# Patient Record
Sex: FEMALE | Race: WHITE | Employment: OTHER | URBAN - METROPOLITAN AREA
[De-identification: names, ages, dates, MRNs, and addresses within clinical notes are randomized per-mention and may not be internally consistent; named-entity substitution may affect disease eponyms.]

---

## 2017-01-09 ENCOUNTER — HOSPITAL ENCOUNTER (OUTPATIENT)
Dept: SLEEP MEDICINE | Age: 57
Discharge: HOME | End: 2017-01-09
Payer: MEDICAID

## 2017-01-09 PROCEDURE — 95810 POLYSOM 6/> YRS 4/> PARAM: CPT

## 2017-04-04 PROBLEM — Z87.891 FORMER SMOKER: Chronic | Status: ACTIVE | Noted: 2017-04-04

## 2017-04-04 PROBLEM — Z87.81 HISTORY OF FRACTURE OF WRIST: Chronic | Status: ACTIVE | Noted: 2017-04-04

## 2017-06-09 PROBLEM — J44.1 COPD WITH ACUTE EXACERBATION (HCC): Status: ACTIVE | Noted: 2017-06-09

## 2017-06-09 PROBLEM — R05.9 COUGH: Status: ACTIVE | Noted: 2017-06-09

## 2017-08-08 PROBLEM — R05.9 COUGH: Status: RESOLVED | Noted: 2017-06-09 | Resolved: 2017-08-08

## 2017-08-08 PROBLEM — J44.1 COPD WITH ACUTE EXACERBATION (HCC): Status: RESOLVED | Noted: 2017-06-09 | Resolved: 2017-08-08

## 2017-08-22 ENCOUNTER — HOSPITAL ENCOUNTER (OUTPATIENT)
Dept: MAMMOGRAPHY | Age: 57
Discharge: HOME | End: 2017-08-22
Attending: FAMILY MEDICINE
Payer: MEDICAID

## 2017-08-22 DIAGNOSIS — Z87.81 HISTORY OF FRACTURE OF WRIST: Chronic | ICD-10-CM

## 2017-08-22 DIAGNOSIS — Z78.0 ASYMPTOMATIC MENOPAUSAL STATE: ICD-10-CM

## 2017-08-22 PROCEDURE — 77080 DXA BONE DENSITY AXIAL: CPT

## 2017-08-23 NOTE — PROGRESS NOTES
I called patient to inform them of abnormal lab/radiology results. Patient verbalized understanding on all.

## 2017-08-30 ENCOUNTER — HOSPITAL ENCOUNTER (EMERGENCY)
Age: 57
Discharge: HOME | End: 2017-08-30
Attending: EMERGENCY MEDICINE
Payer: MEDICAID

## 2017-08-30 ENCOUNTER — APPOINTMENT (OUTPATIENT)
Dept: CT IMAGING | Age: 57
End: 2017-08-30
Attending: EMERGENCY MEDICINE
Payer: MEDICAID

## 2017-08-30 VITALS
TEMPERATURE: 97.9 F | SYSTOLIC BLOOD PRESSURE: 99 MMHG | BODY MASS INDEX: 29.08 KG/M2 | HEART RATE: 83 BPM | RESPIRATION RATE: 16 BRPM | DIASTOLIC BLOOD PRESSURE: 65 MMHG | HEIGHT: 62 IN | OXYGEN SATURATION: 100 % | WEIGHT: 158 LBS

## 2017-08-30 DIAGNOSIS — S00.01XA SCALP ABRASION, INITIAL ENCOUNTER: Primary | ICD-10-CM

## 2017-08-30 DIAGNOSIS — R60.9 PERIPHERAL EDEMA: ICD-10-CM

## 2017-08-30 LAB
ALBUMIN SERPL-MCNC: 3.6 G/DL (ref 3.5–5)
ALBUMIN/GLOB SERPL: 1 {RATIO} (ref 1.2–3.5)
ALP SERPL-CCNC: 76 U/L (ref 50–136)
ALT SERPL-CCNC: 25 U/L (ref 12–65)
ANION GAP SERPL CALC-SCNC: 6 MMOL/L (ref 7–16)
AST SERPL-CCNC: 24 U/L (ref 15–37)
BASOPHILS # BLD: 0 K/UL (ref 0–0.2)
BASOPHILS NFR BLD: 0 % (ref 0–2)
BILIRUB SERPL-MCNC: 0.4 MG/DL (ref 0.2–1.1)
BNP SERPL-MCNC: 40 PG/ML
BUN SERPL-MCNC: 9 MG/DL (ref 6–23)
CALCIUM SERPL-MCNC: 9 MG/DL (ref 8.3–10.4)
CHLORIDE SERPL-SCNC: 101 MMOL/L (ref 98–107)
CO2 SERPL-SCNC: 31 MMOL/L (ref 21–32)
CREAT SERPL-MCNC: 1.11 MG/DL (ref 0.6–1)
DIFFERENTIAL METHOD BLD: ABNORMAL
EOSINOPHIL # BLD: 0.3 K/UL (ref 0–0.8)
EOSINOPHIL NFR BLD: 7 % (ref 0.5–7.8)
ERYTHROCYTE [DISTWIDTH] IN BLOOD BY AUTOMATED COUNT: 12.1 % (ref 11.9–14.6)
GLOBULIN SER CALC-MCNC: 3.5 G/DL (ref 2.3–3.5)
GLUCOSE SERPL-MCNC: 97 MG/DL (ref 65–100)
HCT VFR BLD AUTO: 34.1 % (ref 35.8–46.3)
HGB BLD-MCNC: 12 G/DL (ref 11.7–15.4)
IMM GRANULOCYTES # BLD: 0 K/UL (ref 0–0.5)
IMM GRANULOCYTES NFR BLD: 0 % (ref 0–5)
LYMPHOCYTES # BLD: 1.9 K/UL (ref 0.5–4.6)
LYMPHOCYTES NFR BLD: 42 % (ref 13–44)
MCH RBC QN AUTO: 31.9 PG (ref 26.1–32.9)
MCHC RBC AUTO-ENTMCNC: 35.2 G/DL (ref 31.4–35)
MCV RBC AUTO: 90.7 FL (ref 79.6–97.8)
MONOCYTES # BLD: 0.3 K/UL (ref 0.1–1.3)
MONOCYTES NFR BLD: 6 % (ref 4–12)
NEUTS SEG # BLD: 2.1 K/UL (ref 1.7–8.2)
NEUTS SEG NFR BLD: 45 % (ref 43–78)
PLATELET # BLD AUTO: 253 K/UL (ref 150–450)
PMV BLD AUTO: 9.2 FL (ref 10.8–14.1)
POTASSIUM SERPL-SCNC: 3.7 MMOL/L (ref 3.5–5.1)
PROT SERPL-MCNC: 7.1 G/DL (ref 6.3–8.2)
RBC # BLD AUTO: 3.76 M/UL (ref 4.05–5.25)
SODIUM SERPL-SCNC: 138 MMOL/L (ref 136–145)
WBC # BLD AUTO: 4.5 K/UL (ref 4.3–11.1)

## 2017-08-30 PROCEDURE — 70450 CT HEAD/BRAIN W/O DYE: CPT

## 2017-08-30 PROCEDURE — 83880 ASSAY OF NATRIURETIC PEPTIDE: CPT | Performed by: EMERGENCY MEDICINE

## 2017-08-30 PROCEDURE — 80053 COMPREHEN METABOLIC PANEL: CPT | Performed by: EMERGENCY MEDICINE

## 2017-08-30 PROCEDURE — 85025 COMPLETE CBC W/AUTO DIFF WBC: CPT | Performed by: EMERGENCY MEDICINE

## 2017-08-30 PROCEDURE — 99283 EMERGENCY DEPT VISIT LOW MDM: CPT | Performed by: EMERGENCY MEDICINE

## 2017-08-30 NOTE — ED TRIAGE NOTES
Pt states she slipped and fell last night hitting her head. Pt denies any loc. Pt was at pain management today and was told to come to the er. Pt is on a blood thinner.

## 2017-08-30 NOTE — DISCHARGE INSTRUCTIONS
Head Injury: Care Instructions  Your Care Instructions  Most injuries to the head are minor. Bumps, cuts, and scrapes on the head and face usually heal well and can be treated the same as injuries to other parts of the body. Although it's rare, once in a while a more serious problem shows up after you are home. So it's good to be on the lookout for symptoms for a day or two. Follow-up care is a key part of your treatment and safety. Be sure to make and go to all appointments, and call your doctor if you are having problems. It's also a good idea to know your test results and keep a list of the medicines you take. How can you care for yourself at home? · Follow your doctor's instructions. He or she will tell you if you need someone to watch you closely for the next 24 hours or longer. · Take it easy for the next few days or more if you are not feeling well. · Ask your doctor when it's okay for you to go back to activities like driving a car, riding a bike, or operating machinery. When should you call for help? Call 911 anytime you think you may need emergency care. For example, call if:  · You have a seizure. · You passed out (lost consciousness). · You are confused or can't stay awake. Call your doctor now or seek immediate medical care if:  · You have new or worse vomiting. · You feel less alert. · You have new weakness or numbness in any part of your body. Watch closely for changes in your health, and be sure to contact your doctor if:  · You do not get better as expected. · You have new symptoms, such as headaches, trouble concentrating, or changes in mood. Where can you learn more? Go to http://carole-shira.info/. Enter O419 in the search box to learn more about \"Head Injury: Care Instructions. \"  Current as of: October 14, 2016  Content Version: 11.3  © 0518-7565 [employer].  Care instructions adapted under license by [employer] (which disclaims liability or warranty for this information). If you have questions about a medical condition or this instruction, always ask your healthcare professional. [place] [address] any warranty or liability for your use of this information. Leg and Ankle Edema: Care Instructions  Your Care Instructions  Swelling in the legs, ankles, and feet is called edema. It is common after you sit or stand for a while. Long plane flights or car rides often cause swelling in the legs and feet. You may also have swelling if you have to stand for long periods of time at your job. Problems with the veins in the legs (varicose veins) and changes in hormones can also cause swelling. Sometimes the swelling in the ankles and feet is caused by a more serious problem, such as heart failure, infection, blood clots, or liver or kidney disease. Follow-up care is a key part of your treatment and safety. Be sure to make and go to all appointments, and call your doctor if you are having problems. Its also a good idea to know your test results and keep a list of the medicines you take. How can you care for yourself at home? · If your doctor gave you medicine, take it as prescribed. Call your doctor if you think you are having a problem with your medicine. · Whenever you are resting, raise your legs up. Try to keep the swollen area higher than the level of your heart. · Take breaks from standing or sitting in one position. ¨ Walk around to increase the blood flow in your lower legs. ¨ Move your feet and ankles often while you stand, or tighten and relax your leg muscles. · Wear support stockings. Put them on in the morning, before swelling gets worse. · Eat a balanced diet. Lose weight if you need to. · Limit the amount of salt (sodium) in your diet. Salt holds fluid in the body and may increase swelling. When should you call for help? Call 911 anytime you think you may need emergency care.  For example, call if:  · You have symptoms of a blood clot in your lung (called a pulmonary embolism). These may include:  ¨ Sudden chest pain. ¨ Trouble breathing. ¨ Coughing up blood. Call your doctor now or seek immediate medical care if:  · You have signs of a blood clot, such as:  ¨ Pain in your calf, back of the knee, thigh, or groin. ¨ Redness and swelling in your leg or groin. · You have symptoms of infection, such as:  ¨ Increased pain, swelling, warmth, or redness. ¨ Red streaks or pus. ¨ A fever. Watch closely for changes in your health, and be sure to contact your doctor if:  · Your swelling is getting worse. · You have new or worsening pain in your legs. · You do not get better as expected. Where can you learn more? Go to http://carole-shira.info/. Enter W914 in the search box to learn more about \"Leg and Ankle Edema: Care Instructions. \"  Current as of: March 20, 2017  Content Version: 11.3  © 1724-4706 [employer]. Care instructions adapted under license by [employer] (which disclaims liability or warranty for this information). If you have questions about a medical condition or this instruction, always ask your healthcare professional. [address] any warranty or liability for your use of this information.

## 2017-08-30 NOTE — ED PROVIDER NOTES
HPI Comments: 60-year-old white female with history of DVT currently on xarelto reportedly fell forward off of the toilet last night and struck her head on a towel rack. She had some bleeding from her scalp. She had no loss of consciousness. No nausea or vomiting. She went to her pain management doctor today and was advised to come to the emergency department because of the head trauma. She is also complaining of swelling to her legs. This has been present for over a month. She has been seen in the emergency department and had ultrasound negative for DVT. Patient is a 55 y.o. female presenting with fall. The history is provided by the patient. Fall   Pertinent negatives include no fever, no abdominal pain, no nausea and no vomiting. Past Medical History:   Diagnosis Date    Arrhythmia     Diagnosed 2010, patient believes now resolved.  Arthritis     Osteoarthritis diagnosed [id number]    Asthma     Diagnosed- can't recall.  Chronic pain     diagnosed 1994 severe fibromyalgia and 2001 infarcts pleurisy from PE.  Depression     Diagnosed 2002     GERD (gastroesophageal reflux disease)     Diagnosed 2001.  Hypercholesterolemia     Diagnosed 2011.  Hypertension     Diagnose 2001     Other ill-defined conditions     IBS< Fibra    Thromboembolus [facility])     Diagnosed 2001 DVT    Thyroid disease     hypothryroidism diagnosed approx 2013.  Trauma     PTSD diagnosed 1999. Past Surgical History:   Procedure Laterality Date    HX GYN      2 D&C due to miscarriages. Year 1984 and 1992. 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 2014 patient had tendon transplant, carpal tunnel surgery and trigger finger repair left hand.             Family History:   Problem Relation Age of Onset    Clotting Disorder Maternal Aunt     Cancer Maternal Uncle     Cancer Maternal Grandmother     Clotting Disorder Maternal Grandfather     Heart Disease Maternal Grandfather     Cancer Maternal Grandfather     Clotting Disorder Mother        Social History     Social History    Marital status:      Spouse name: N/A    Number of children: N/A    Years of education: N/A     Occupational History    Not on file. Social History Main Topics    Smoking status: Former Smoker     Types: Cigarettes     Quit date: 9/1/2016    Smokeless tobacco: Never Used    Alcohol use Not on file      Comment: Once a year.  Drug use: No    Sexual activity: No     Other Topics Concern    Not on file     Social History Narrative         ALLERGIES: Codeine    Review of Systems   Constitutional: Negative for fever. HENT: Negative for congestion. Respiratory: Negative for cough and shortness of breath. Cardiovascular: Negative for chest pain. Gastrointestinal: Negative for abdominal pain, nausea and vomiting. Musculoskeletal: Negative for back pain and neck pain. Skin: Negative for rash. Vitals:    08/30/17 1645   BP: 99/66   Pulse: 88   Resp: 26   Temp: 97.9 °F (36.6 °C)   SpO2: 100%   Weight: 71.7 kg (158 lb)   Height: 5' 2\" (1.575 m)            Physical Exam   Constitutional: She is oriented to person, place, and time. She appears well-developed and well-nourished. No distress. HENT:   Head: Normocephalic. Small scalp abrasion right upper parietal area. No active bleeding. Eyes: Conjunctivae and EOM are normal. Pupils are equal, round, and reactive to light. Neck: Normal range of motion. Neck supple. Cardiovascular: Normal rate and regular rhythm. No murmur heard. Pulmonary/Chest: Effort normal and breath sounds normal. She has no wheezes. She has no rales. Musculoskeletal: She exhibits edema. Symmetric lower extremity edema   Neurological: She is alert and oriented to person, place, and time. She exhibits normal muscle tone. Coordination normal.   Skin: Skin is warm and dry.    Psychiatric: She has a normal mood and affect. Nursing note and vitals reviewed. MDM  Number of Diagnoses or Management Options  Peripheral edema:   Scalp abrasion, initial encounter:   Diagnosis management comments: Lab work reveals no evidence of renal failure, liver failure or heart failure. I suspect her lower extremity swelling is venous insufficiency. Advised to use compression hose. Head CT shows no intracranial injury or skull fracture.        Amount and/or Complexity of Data Reviewed  Clinical lab tests: ordered and reviewed  Tests in the radiology section of CPT®: ordered and reviewed    Risk of Complications, Morbidity, and/or Mortality  Presenting problems: moderate  Diagnostic procedures: low  Management options: low      ED Course       Procedures

## 2017-12-26 PROBLEM — K59.09 CHRONIC CONSTIPATION: Status: ACTIVE | Noted: 2017-12-26

## 2017-12-26 PROBLEM — K59.09 CHRONIC CONSTIPATION: Chronic | Status: ACTIVE | Noted: 2017-12-26

## 2018-04-16 PROBLEM — R68.2 DRY MOUTH: Chronic | Status: ACTIVE | Noted: 2018-04-16

## 2018-04-16 PROBLEM — I10 BENIGN ESSENTIAL HTN: Chronic | Status: ACTIVE | Noted: 2018-04-16

## 2018-04-16 PROBLEM — R14.0 ABDOMINAL BLOATING: Status: RESOLVED | Noted: 2017-03-08 | Resolved: 2018-04-16

## 2018-04-16 PROBLEM — R13.10 DYSPHAGIA: Status: ACTIVE | Noted: 2017-03-08

## 2018-04-16 PROBLEM — B37.0 THRUSH: Chronic | Status: ACTIVE | Noted: 2018-04-16

## 2018-04-16 PROBLEM — H04.123 DRY EYES: Chronic | Status: ACTIVE | Noted: 2018-04-16

## 2018-04-16 PROBLEM — R13.10 DYSPHAGIA: Status: RESOLVED | Noted: 2017-03-08 | Resolved: 2018-04-16

## 2018-04-16 PROBLEM — R14.0 ABDOMINAL BLOATING: Status: ACTIVE | Noted: 2017-03-08

## 2018-09-20 ENCOUNTER — ANESTHESIA EVENT (OUTPATIENT)
Dept: SURGERY | Age: 58
End: 2018-09-20
Payer: MEDICAID

## 2018-09-21 ENCOUNTER — HOSPITAL ENCOUNTER (OUTPATIENT)
Age: 58
Setting detail: OUTPATIENT SURGERY
Discharge: HOME | End: 2018-09-21
Attending: ORTHOPAEDIC SURGERY | Admitting: ORTHOPAEDIC SURGERY
Payer: MEDICAID

## 2018-09-21 ENCOUNTER — ANESTHESIA (OUTPATIENT)
Dept: SURGERY | Age: 58
End: 2018-09-21
Payer: MEDICAID

## 2018-09-21 VITALS
SYSTOLIC BLOOD PRESSURE: 100 MMHG | HEART RATE: 85 BPM | DIASTOLIC BLOOD PRESSURE: 63 MMHG | TEMPERATURE: 97.5 F | RESPIRATION RATE: 16 BRPM | BODY MASS INDEX: 28.53 KG/M2 | WEIGHT: 156 LBS | OXYGEN SATURATION: 90 %

## 2018-09-21 DIAGNOSIS — G89.18 POST-OP PAIN: Primary | ICD-10-CM

## 2018-09-21 LAB — POTASSIUM BLD-SCNC: 3.7 MMOL/L (ref 3.5–5.1)

## 2018-09-21 PROCEDURE — 77030003602 HC NDL NRV BLK BBMI -B: Performed by: ANESTHESIOLOGY

## 2018-09-21 PROCEDURE — 77030039266 HC ADH SKN EXOFIN S2SG -A: Performed by: ORTHOPAEDIC SURGERY

## 2018-09-21 PROCEDURE — 74011250636 HC RX REV CODE- 250/636: Performed by: ANESTHESIOLOGY

## 2018-09-21 PROCEDURE — 76942 ECHO GUIDE FOR BIOPSY: CPT | Performed by: ORTHOPAEDIC SURGERY

## 2018-09-21 PROCEDURE — 77030000032 HC CUF TRNQT ZIMM -B: Performed by: ORTHOPAEDIC SURGERY

## 2018-09-21 PROCEDURE — 76060000033 HC ANESTHESIA 1 TO 1.5 HR: Performed by: ORTHOPAEDIC SURGERY

## 2018-09-21 PROCEDURE — 77030004434 HC BUR RND STRY -B: Performed by: ORTHOPAEDIC SURGERY

## 2018-09-21 PROCEDURE — 77030002966 HC SUT PDS J&J -A: Performed by: ORTHOPAEDIC SURGERY

## 2018-09-21 PROCEDURE — 77030006605 HC BLD CRPL IN BIOM -C: Performed by: ORTHOPAEDIC SURGERY

## 2018-09-21 PROCEDURE — 77030002912 HC SUT ETHBND J&J -A: Performed by: ORTHOPAEDIC SURGERY

## 2018-09-21 PROCEDURE — 74011250636 HC RX REV CODE- 250/636

## 2018-09-21 PROCEDURE — 76010000161 HC OR TIME 1 TO 1.5 HR INTENSV-TIER 1: Performed by: ORTHOPAEDIC SURGERY

## 2018-09-21 PROCEDURE — A4565 SLINGS: HCPCS | Performed by: ORTHOPAEDIC SURGERY

## 2018-09-21 PROCEDURE — 74011000250 HC RX REV CODE- 250

## 2018-09-21 PROCEDURE — 76210000016 HC OR PH I REC 1 TO 1.5 HR: Performed by: ORTHOPAEDIC SURGERY

## 2018-09-21 PROCEDURE — 77030018836 HC SOL IRR NACL ICUM -A: Performed by: ORTHOPAEDIC SURGERY

## 2018-09-21 PROCEDURE — 84132 ASSAY OF SERUM POTASSIUM: CPT

## 2018-09-21 PROCEDURE — 77030006788 HC BLD SAW OSC STRY -B: Performed by: ORTHOPAEDIC SURGERY

## 2018-09-21 PROCEDURE — 77030002916 HC SUT ETHLN J&J -A: Performed by: ORTHOPAEDIC SURGERY

## 2018-09-21 PROCEDURE — 77030019908 HC STETH ESOPH SIMS -A: Performed by: ANESTHESIOLOGY

## 2018-09-21 PROCEDURE — 76010010054 HC POST OP PAIN BLOCK: Performed by: ORTHOPAEDIC SURGERY

## 2018-09-21 PROCEDURE — 76210000020 HC REC RM PH II FIRST 0.5 HR: Performed by: ORTHOPAEDIC SURGERY

## 2018-09-21 PROCEDURE — 74011250636 HC RX REV CODE- 250/636: Performed by: ORTHOPAEDIC SURGERY

## 2018-09-21 RX ORDER — OXYCODONE HYDROCHLORIDE 5 MG/1
10 TABLET ORAL
Status: DISCONTINUED | OUTPATIENT
Start: 2018-09-21 | End: 2018-09-21 | Stop reason: HOSPADM

## 2018-09-21 RX ORDER — NALOXONE HYDROCHLORIDE 0.4 MG/ML
0.1 INJECTION, SOLUTION INTRAMUSCULAR; INTRAVENOUS; SUBCUTANEOUS
Status: DISCONTINUED | OUTPATIENT
Start: 2018-09-21 | End: 2018-09-21 | Stop reason: HOSPADM

## 2018-09-21 RX ORDER — FLUMAZENIL 0.1 MG/ML
0.2 INJECTION INTRAVENOUS AS NEEDED
Status: DISCONTINUED | OUTPATIENT
Start: 2018-09-21 | End: 2018-09-21 | Stop reason: HOSPADM

## 2018-09-21 RX ORDER — FENTANYL CITRATE 50 UG/ML
100 INJECTION, SOLUTION INTRAMUSCULAR; INTRAVENOUS ONCE
Status: COMPLETED | OUTPATIENT
Start: 2018-09-21 | End: 2018-09-21

## 2018-09-21 RX ORDER — ONDANSETRON 8 MG/1
8 TABLET, ORALLY DISINTEGRATING ORAL
Qty: 12 TAB | Refills: 1 | Status: SHIPPED | OUTPATIENT
Start: 2018-09-21 | End: 2018-10-16 | Stop reason: ALTCHOICE

## 2018-09-21 RX ORDER — CEFAZOLIN SODIUM/WATER 2 G/20 ML
2 SYRINGE (ML) INTRAVENOUS
Status: COMPLETED | OUTPATIENT
Start: 2018-09-21 | End: 2018-09-21

## 2018-09-21 RX ORDER — PROPOFOL 10 MG/ML
INJECTION, EMULSION INTRAVENOUS
Status: DISCONTINUED | OUTPATIENT
Start: 2018-09-21 | End: 2018-09-21 | Stop reason: HOSPADM

## 2018-09-21 RX ORDER — PROPOFOL 10 MG/ML
INJECTION, EMULSION INTRAVENOUS AS NEEDED
Status: DISCONTINUED | OUTPATIENT
Start: 2018-09-21 | End: 2018-09-21 | Stop reason: HOSPADM

## 2018-09-21 RX ORDER — DIPHENHYDRAMINE HYDROCHLORIDE 50 MG/ML
12.5 INJECTION, SOLUTION INTRAMUSCULAR; INTRAVENOUS
Status: DISCONTINUED | OUTPATIENT
Start: 2018-09-21 | End: 2018-09-21 | Stop reason: HOSPADM

## 2018-09-21 RX ORDER — LIDOCAINE HYDROCHLORIDE 10 MG/ML
0.1 INJECTION INFILTRATION; PERINEURAL AS NEEDED
Status: DISCONTINUED | OUTPATIENT
Start: 2018-09-21 | End: 2018-09-21 | Stop reason: HOSPADM

## 2018-09-21 RX ORDER — OXYCODONE HYDROCHLORIDE 5 MG/1
5 TABLET ORAL
Qty: 30 TAB | Refills: 0 | Status: SHIPPED | OUTPATIENT
Start: 2018-09-21 | End: 2018-10-16 | Stop reason: ALTCHOICE

## 2018-09-21 RX ORDER — SODIUM CHLORIDE, SODIUM LACTATE, POTASSIUM CHLORIDE, CALCIUM CHLORIDE 600; 310; 30; 20 MG/100ML; MG/100ML; MG/100ML; MG/100ML
75 INJECTION, SOLUTION INTRAVENOUS CONTINUOUS
Status: DISCONTINUED | OUTPATIENT
Start: 2018-09-21 | End: 2018-09-21 | Stop reason: HOSPADM

## 2018-09-21 RX ORDER — HYDROMORPHONE HYDROCHLORIDE 2 MG/ML
0.5 INJECTION, SOLUTION INTRAMUSCULAR; INTRAVENOUS; SUBCUTANEOUS
Status: DISCONTINUED | OUTPATIENT
Start: 2018-09-21 | End: 2018-09-21 | Stop reason: HOSPADM

## 2018-09-21 RX ORDER — LIDOCAINE HYDROCHLORIDE 20 MG/ML
INJECTION, SOLUTION EPIDURAL; INFILTRATION; INTRACAUDAL; PERINEURAL AS NEEDED
Status: DISCONTINUED | OUTPATIENT
Start: 2018-09-21 | End: 2018-09-21 | Stop reason: HOSPADM

## 2018-09-21 RX ORDER — MIDAZOLAM HYDROCHLORIDE 1 MG/ML
2 INJECTION, SOLUTION INTRAMUSCULAR; INTRAVENOUS ONCE
Status: COMPLETED | OUTPATIENT
Start: 2018-09-21 | End: 2018-09-21

## 2018-09-21 RX ORDER — OXYCODONE HYDROCHLORIDE 5 MG/1
5 TABLET ORAL
Status: DISCONTINUED | OUTPATIENT
Start: 2018-09-21 | End: 2018-09-21 | Stop reason: HOSPADM

## 2018-09-21 RX ORDER — MIDAZOLAM HYDROCHLORIDE 1 MG/ML
2 INJECTION, SOLUTION INTRAMUSCULAR; INTRAVENOUS
Status: DISCONTINUED | OUTPATIENT
Start: 2018-09-21 | End: 2018-09-21 | Stop reason: HOSPADM

## 2018-09-21 RX ADMIN — Medication 2 G: at 13:26

## 2018-09-21 RX ADMIN — SODIUM CHLORIDE, SODIUM LACTATE, POTASSIUM CHLORIDE, AND CALCIUM CHLORIDE 75 ML/HR: 600; 310; 30; 20 INJECTION, SOLUTION INTRAVENOUS at 12:00

## 2018-09-21 RX ADMIN — FENTANYL CITRATE 100 MCG: 50 INJECTION INTRAMUSCULAR; INTRAVENOUS at 12:07

## 2018-09-21 RX ADMIN — MIDAZOLAM HYDROCHLORIDE 2 MG: 1 INJECTION, SOLUTION INTRAMUSCULAR; INTRAVENOUS at 12:07

## 2018-09-21 RX ADMIN — LIDOCAINE HYDROCHLORIDE 40 MG: 20 INJECTION, SOLUTION EPIDURAL; INFILTRATION; INTRACAUDAL; PERINEURAL at 13:24

## 2018-09-21 RX ADMIN — PROPOFOL 100 MG: 10 INJECTION, EMULSION INTRAVENOUS at 13:20

## 2018-09-21 RX ADMIN — PROPOFOL 120 MCG/KG/MIN: 10 INJECTION, EMULSION INTRAVENOUS at 13:20

## 2018-09-21 NOTE — ANESTHESIA PROCEDURE NOTES
Peripheral Block Start time: 9/21/2018 12:08 PM 
End time: 9/21/2018 12:15 PM 
Performed by: Lennox Abu Authorized by: Cristin Tello: Indications: at surgeon's request and post-op pain management Preanesthetic Checklist: patient identified, risks and benefits discussed, site marked, timeout performed, anesthesia consent given and patient being monitored Timeout Time: 12:07 Block Type:  
Block Type:  Axillary Laterality:  Left Monitoring:  Standard ASA monitoring, responsive to questions, oxygen, continuous pulse ox, frequent vital sign checks and heart rate Injection Technique:  Single shot Procedures: ultrasound guided and nerve stimulator Patient Position: supine Prep: chlorhexidine Location:  Axilla Needle Type:  Stimuplex Needle Gauge:  22 G Needle Localization:  Nerve stimulator and ultrasound guidance Motor Response: minimal motor response >0.4 mA Medication Injected:  0.5% 
ropivacaine Adds:  Epi 1:200K Volume (mL):  45 
 
Assessment: 
Number of attempts:  1 Injection Assessment:  Incremental injection every 5 mL, no paresthesia, ultrasound image on chart, local visualized surrounding nerve on ultrasound, negative aspiration for blood and no intravascular symptoms Patient tolerance:  Patient tolerated the procedure well with no immediate complications Individual nerves (Radial, Ulnar, Median, Musculotaneous) visualized and surrounded by LA under ultrasound guidance. Intercostobrachial nerve block with SQ infiltration 8 ml plain 0.5% Ropivicaine for tourniquet supplemental coverage.

## 2018-09-21 NOTE — IP AVS SNAPSHOT
303 [facility] 
 
 
 [address] [facility] 322 [facility] 
[phone] Patient: Uday Chiu MRN: [id number] :1960 About your hospitalization You were admitted on:  2018 You last received care in the:  [facility] OP PACU You were discharged on:  2018 Why you were hospitalized Your primary diagnosis was:  Not on File Follow-up Information Follow up With Details Comments Contact Info Gus Llamas MD   [address] 
[phone] Your Scheduled Appointments Wednesday October 10, 2018  2:00 PM EDT  
LAB with [facility] [address] [place] [address] [address]  
[phone] 2018  3:00 PM EDT Extended Office Visit with Gus Llamas MD  
[address] [place] [address] [address]  
[phone] 2018  1:30 PM EDT Complete Physical with Sharren Schirmer, PA-C [address] [place] [address] [address]  
[phone] Discharge Orders None A check rosette indicates which time of day the medication should be taken. My Medications START taking these medications Instructions Each Dose to Equal  
 Morning Noon Evening Bedtime  
 ondansetron 8 mg disintegrating tablet Commonly known as:  ZOFRAN ODT Your last dose was: Your next dose is: Take 1 Tab by mouth every eight (8) hours as needed for Nausea. 8 mg  
    
   
   
   
  
 oxyCODONE IR 5 mg immediate release tablet Commonly known as:  James Salen Your last dose was: Your next dose is: Take 1 Tab by mouth every four (4) hours as needed for Pain. Max Daily Amount: 30 mg.  
 5 mg STOP taking these medications   
 morphine CR 15 mg CR tablet Commonly known as:  MS CONTIN  
   
  
 traMADol 50 mg tablet Commonly known as:  August Keep your doctor about these medications Instructions Each Dose to Equal  
 Morning Noon Evening Bedtime * albuterol 90 mcg/actuation inhaler Commonly known as:  PROVENTIL HFA Your last dose was: Your next dose is: Take 2 Puffs by inhalation every four (4) hours as needed for Wheezing. 2 Puff * albuterol 2.5 mg /3 mL (0.083 %) nebulizer solution Commonly known as:  PROVENTIL VENTOLIN Your last dose was: Your next dose is:    
   
   
 3 mL by Nebulization route four (4) times daily. 2.5 mg  
    
   
   
   
  
 amLODIPine 5 mg tablet Commonly known as:  Brenna Pace Your last dose was: Your next dose is: Take 1 Tab by mouth daily. 5 mg  
    
   
   
   
  
 ascorbic acid (vitamin C) 250 mg tablet Commonly known as:  VITAMIN C Your last dose was: Your next dose is: Take 1 Tab by mouth daily. 250 mg  
    
   
   
   
  
 atorvastatin 20 mg tablet Commonly known as:  LIPITOR Your last dose was: Your next dose is:    
   
   
 take 1 tablet by mouth once daily  
     
   
   
   
  
 baclofen 20 mg tablet Commonly known as:  LIORESAL Your last dose was: Your next dose is:    
   
   
 take 1 tablet by mouth Three times a day  
     
   
   
   
  
 benzonatate 200 mg capsule Commonly known as:  TESSALON Your last dose was: Your next dose is: TAKE 1 CAPSULE BY MOUTH THREE TIMES DAILY AS NEEDED FOR COUGH FOR 7 DAYS  
     
   
   
   
  
 buPROPion  mg SR tablet Commonly known as:  Alycia Guallpa Your last dose was: Your next dose is: Take 1 Tab by mouth two (2) times a day.   
 150 mg  
    
   
   
   
  
 CALCIUM 600 WITH VITAMIN D3 600 mg(1,500mg) -400 unit Chew Generic drug:  Calcium-Cholecalciferol (D3) Your last dose was: Your next dose is: Take  by mouth.  
     
   
   
   
  
 cyanocobalamin 1,000 mcg tablet Commonly known as:  VITAMIN B-12 Your last dose was: Your next dose is: Take 1 Tab by mouth daily. 1000 mcg  
    
   
   
   
  
 cyclobenzaprine 10 mg tablet Commonly known as:  FLEXERIL Your last dose was: Your next dose is:    
   
   
 take 1 tablet by mouth three times a day if needed for SPASM DULoxetine 60 mg capsule Commonly known as:  CYMBALTA Your last dose was: Your next dose is:    
   
   
 take 1 capsule by mouth daily  
     
   
   
   
  
 eflornithine 13.9 % topical cream  
Commonly known as:  [employer] Your last dose was: Your next dose is:    
   
   
 Apply  to affected area two (2) times a day. apply thin layer to affected areas space application by 8 hour  
     
   
   
   
  
 ergocalciferol 50,000 unit capsule Commonly known as:  VITAMIN D2 Your last dose was: Your next dose is: TAKE 1 CAPSULE BY MOUTH EVERY 7 DAYS  
     
   
   
   
  
 fluconazole 150 mg tablet Commonly known as:  DIFLUCAN Your last dose was: Your next dose is: FDA advises cautious prescribing of oral fluconazole in pregnancy. fluticasone 50 mcg/actuation nasal spray Commonly known as:  William Gallegos Your last dose was: Your next dose is:    
   
   
 USE 2 SPRAY(S) IN EACH NOSTRIL ONCE DAILY AS NEEDED FOR  RHINITIS  OR  ALLERGIES  
     
   
   
   
  
 fluticasone-vilanterol 100-25 mcg/dose inhaler Commonly known as:  BREO ELLIPTA Your last dose was: Your next dose is: Take 1 Puff by inhalation daily. 1 Puff  
    
   
   
   
  
 gabapentin 300 mg capsule Commonly known as:  NEURONTIN Your last dose was: Your next dose is: TAKE 1 CAPSULE BY MOUTH 2 TIMES DAILY  
     
   
   
   
  
 guaiFENesin  mg ER tablet Commonly known as:  Saurav & Saurav Your last dose was: Your next dose is: Take 2 Tabs by mouth two (2) times a day. 1200 mg  
    
   
   
   
  
 hydroCHLOROthiazide 25 mg tablet Commonly known as:  HYDRODIURIL Your last dose was: Your next dose is:    
   
   
 take 1 tablet by mouth once daily  
     
   
   
   
  
 ipratropium 0.02 % Soln Commonly known as:  ATROVENT Your last dose was: Your next dose is:    
   
   
 2.5 mL by Nebulization route every four (4) hours as needed. 0.5 mg  
    
   
   
   
  
 lubiPROStone 24 mcg capsule Commonly known as:  Marcos Joan Your last dose was: Your next dose is: Take 1 Cap by mouth two (2) times daily (with meals). 24 mcg  
    
   
   
   
  
 magic mouthwash Susp Commonly known as:  [place] Your last dose was: Your next dose is: Take 5 mL by mouth every four (4) hours. 5 mL  
    
   
   
   
  
 meloxicam 15 mg tablet Commonly known as:  MOBIC Your last dose was: Your next dose is: TAKE 1 TABLET BY MOUTH ONCE DAILY  
     
   
   
   
  
 omega-3 acid ethyl esters 1 gram capsule Commonly known as:  [place] Your last dose was: Your next dose is: Take 2 Caps by mouth two (2) times a day. 2 g  
    
   
   
   
  
 omeprazole 20 mg capsule Commonly known as:  PRILOSEC Your last dose was: Your next dose is: TAKE 1 CAPSULE BY MOUTH ONCE DAILY  
     
   
   
   
  
 OTHER Your last dose was: Your next dose is:    
   
   
 Zatidor . 025% 1 drop both eyes bid OXYGEN-AIR DELIVERY SYSTEMS Your last dose was: Your next dose is: by Nasal route. 3 lpm qhs  
     
   
   
   
  
 polyethylene glycol 17 gram/dose powder Commonly known as:  Reglennie Gasparzier Your last dose was: Your next dose is:    
   
   
 take 17GM (DISSOLVED IN WATER) by mouth once daily  
     
   
   
   
  
 potassium chloride SR 10 mEq tablet Commonly known as:  K-TAB Your last dose was: Your next dose is: Take 2 Tabs by mouth two (2) times a day. 20 mEq SPIRIVA RESPIMAT 2.5 mcg/actuation inhaler Generic drug:  tiotropium bromide Your last dose was: Your next dose is:    
   
   
 INHALE 2 PUFFS BY MOUTH ONCE DAILY  
     
   
   
   
  
 synthroid 50 mcg tablet Generic drug:  levothyroxine Your last dose was: Your next dose is: TAKE 1 TABLET BY MOUTH BEFORE BREAKFAST  
     
   
   
   
  
 VOLTAREN 1 % Gel Generic drug:  diclofenac Your last dose was: Your next dose is:    
   
   
 Apply  to affected area four (4) times daily as needed. XANAX 1 mg tablet Generic drug:  ALPRAZolam  
   
Your last dose was: Your next dose is: Take 1 mg by mouth three (3) times daily as needed. 1 mg XARELTO 20 mg Tab tablet Generic drug:  rivaroxaban Your last dose was: Your next dose is: TAKE 1 TABLET BY MOUTH ONCE DAILY * Notice: This list has 2 medication(s) that are the same as other medications prescribed for you. Read the directions carefully, and ask your doctor or other care provider to review them with you. Where to Get Your Medications Information on where to get these meds will be given to you by the nurse or doctor. ! Ask your nurse or doctor about these medications  
  ondansetron 8 mg disintegrating tablet  
 oxyCODONE IR 5 mg immediate release tablet Opioid Education Prescription Opioids: What You Need to Know: 
 
 
3. Ice and elevate the surgical site. 4.  Keep splints and dressing dry and intact. 5.  If able to tolerate, take   Acetaminophen 325 mg  2 every 4 hrs   And Ibuprofen 200 mg   3 every 6 hrs   OR   Aleve 220 mg 2 every 12 hrs to help with pain ( Do not take Ibuprofen or Aleve if taking any other anti inflamatory                                                    drug, NSAID, or anti arthritis drug or if taking blood thinners.) Vitamin C   500 mg  Once a day for 2 months. TYPICAL SIDE EFFECTS OF PAIN MEDICATION: 
*    Constipation: Drink lots of fluids. Over the counter stool softener if needed. *    Nausea: Take pain medication with food. Call your doctor with persistent nausea. ACTIVITY · As tolerated and as directed by your doctor. · Bathe or shower as directed by your doctor. DIET 
· Day of surgery: Clear liquids until no nausea or vomiting; small portion, light diet [place] foods (ex: baked chicken, plain rice, grits, scrambled eggs, toast). Nothing greasy, fried or spicy today. · Advance to regular diet on second day, unless your doctor orders otherwise. · If nausea and vomiting continues, call your doctor.   
 
PAIN 
 · Take pain medication as directed by your doctor. · DO NOT take aspirin or blood thinners unless directed by your doctor. CALL YOUR DOCTOR IF   
s Call your doctor if pain is NOT relieved by medication.  
s Excessive bleeding that does not stop after holding pressure over the area · Temperature of 101 degrees F or above · Excessive redness, swelling or bruising, and/ or green or yellow, smelly discharge from incision AFTER ANESTHESIA · For the first 24 hours: DO NOT Drive, Drink alcoholic beverages, or Make important decisions. · Be aware of dizziness following anesthesia and while taking pain medication. DISCHARGE SUMMARY from Nurse PATIENT INSTRUCTIONS: 
 
After general anesthesia or intravenous sedation, for 24 hours or while taking prescription Narcotics: · Limit your activities · Do not drive and operate hazardous machinery · Do not make important personal or business decisions · Do  not drink alcoholic beverages · If you have not urinated within 8 hours after discharge, please contact your surgeon on call. *  Please give a list of your current medications to your Primary Care Provider. *  Please update this list whenever your medications are discontinued, doses are 
    changed, or new medications (including over-the-counter products) are added. *  Please carry medication information at all times in case of emergency situations. Preventing Infection at Home We care about preventing infection and avoiding the spread of germs  not only when you are in the hospital but also when you return home. When you return home from the hospital, its important to take the following steps to help prevent infection and avoid spreading germs that could infect you and others. Ask everyone in your home to follow these guidelines, too. Clean Your Hands · Clean your hands whenever your hands are visibly dirty, before you eat, before or after touching your mouth, nose or eyes, and before preparing food. Clean them after contact with body fluids, using the restroom, touching animals or changing diapers. · When washing hands, wet them with warm water and work up a lather. Rub hands for at least 15 seconds, then rinse them and pat them dry with a clean towel or paper towel. · When using hand sanitizers, it should take about 15 seconds to rub your hands dry. If not, you probably didnt apply enough . Cover Your Sneeze or Cough Germs are released into the air whenever you sneeze or cough. To prevent the spread of infection: · Turn away from other people before coughing or sneezing. · Cover your mouth or nose with a tissue when you cough or sneeze. Put the tissue in the trash. · If you dont have a tissue, cough or sneeze into your upper sleeve, not your hands. · Always clean your hands after coughing or sneezing. Care for Wounds Your skin is your bodys first line of defense against germs, but an open wound leaves an easy way for germs to enter your body. To prevent infection: · Clean your hands before and after changing wound dressings, and wear gloves to change dressings if recommended by your doctor. · Take special care with IV lines or other devices inserted into the body. If you must touch them, clean your hands first. 
· Follow any specific instructions from your doctor to care for your wounds. Contact your doctor if you experience any signs of infection, such as fever or increased redness at the surgical or wound site. Keep a [place] [address] · Clean or wipe commonly touched hard surfaces like door handles, sinks, tabletops, phones and TV remotes. · Use products labeled disinfectant to kill harmful bacteria and viruses. · Use a clean cloth or paper towel to clean and dry surfaces. Wiping surfaces with a dirty dishcloth, sponge or towel will only spread germs. · Never share toothbrushes, mg, drinking glasses, utensils, razor blades, face cloths or bath towels to avoid spreading germs. · Be sure that the linens that you sleep on are clean. · Keep pets away from wounds and wash your hands after touching pets, their toys or bedding. We care about you and your health. Remember, preventing infections is a team effort between you, your family, friends and health care providers. These are general instructions for a healthy lifestyle: No smoking/ No tobacco products/ Avoid exposure to second hand smoke Surgeon General's Warning:  Quitting smoking now greatly reduces serious risk to your health. Obesity, smoking, and sedentary lifestyle greatly increases your risk for illness A healthy diet, regular physical exercise & weight monitoring are important for maintaining a healthy lifestyle You may be retaining fluid if you have a history of heart failure or if you experience any of the following symptoms:  Weight gain of 3 pounds or more overnight or 5 pounds in a week, increased swelling in our hands or feet or shortness of breath while lying flat in bed. Please call your doctor as soon as you notice any of these symptoms; do not wait until your next office visit. Recognize signs and symptoms of STROKE: 
 
F-face looks uneven A-arms unable to move or move unevenly S-speech slurred or non-existent T-time-call 911 as soon as signs and symptoms begin-DO NOT go Back to bed or wait to see if you get better-TIME IS BRAIN. Introducing [facility]! Dear Lazara Ferguson: Thank you for requesting a Floop account. Our records indicate that you already have an active Floop account. You can access your account anytime at [email or web address] Did you know that you can access your hospital and ER discharge instructions at any time in Floop?   You can also review all of your test results from your hospital stay or ER visit. Additional Information If you have questions, please visit the Frequently Asked Questions section of the [email or web address] website at [email or web address] Remember, [place] is NOT to be used for urgent needs. For medical emergencies, dial 911. Now available from your iPhone and Android! Introducing Jatin Serrano As a Rebecca Avila patient, I wanted to make you aware of our electronic visit tool called Jatin Serrano. Easy Pairings/7 allows you to connect within minutes with a medical provider 24 hours a day, seven days a week via a mobile device or tablet or logging into a secure website from your computer. You can access Jatin Symetislolyfin from anywhere in the [place]. A virtual visit might be right for you when you have a simple condition and feel like you just dont want to get out of bed, or cant get away from work for an appointment, when your regular Rebecca YeHive provider is not available (evenings, weekends or holidays), or when youre out of town and need minor care. Electronic visits cost only $49 and if the Easy Pairings/Lang Ma provider determines a prescription is needed to treat your condition, one can be electronically transmitted to a nearby pharmacy*. Please take a moment to enroll today if you have not already done so. The enrollment process is free and takes just a few minutes. To enroll, please download the Easy Pairings/Lang Ma carmelo to your tablet or phone, or visit [email or web address] to enroll on your computer. And, as an [address] [place] [address] patient with a [employer] account, the results of your visits will be scanned into your electronic medical record and your primary care provider will be able to view the scanned results. We urge you to continue to see your regular Rebecca Olds provider for your ongoing medical care.   And while your primary care provider may not be the one available when you seek a Jatin Serrano virtual visit, the peace of mind you get from getting a real diagnosis real time can be priceless. For more information on Jatin Serrano, view our Frequently Asked Questions (FAQs) at [email or web address]. Sincerely, 
 
Marie Bright MD 
Chief Medical Officer 50[address] *:  certain medications cannot be prescribed via Jatin Serrano Providers Seen During Your Hospitalization Provider Specialty Primary office phone Lorraine Morris MD Orthopedic Surgery [phone] Your Primary Care Physician (PCP) Primary Care Physician Office Phone Office Fax Edi Garber [phone] You are allergic to the following Allergen Reactions Codeine Other (comments)  
 syncopal  
  
Recent Documentation Weight BMI OB Status Smoking Status 70.8 kg 28.53 kg/m2 Postmenopausal Former Smoker Emergency Contacts Name Discharge Info Relation Home Work Mobile Bobby [address] CAREGIVER [3] Mother [14] [phone] Patient Belongings The following personal items are in your possession at time of discharge: 
  Dental Appliances: Uppers  Visual Aid: None      Home Medications: None   Jewelry: None  Clothing: Footwear, Pants, Shirt, Undergarments    Other Valuables: None Please provide this summary of care documentation to your next provider. Signatures-by signing, you are acknowledging that this After Visit Summary has been reviewed with you and you have received a copy. Patient Signature:  ____________________________________________________________ Date:  ____________________________________________________________  
  
Nicole Lubin Provider Signature:  ____________________________________________________________ Date:  ____________________________________________________________

## 2018-09-21 NOTE — BRIEF OP NOTE
BRIEF OPERATIVE NOTE Date of Procedure: 9/21/2018 Preoperative Diagnosis: Carpal tunnel syndrome, bilateral [G56.03] Localized primary osteoarthritis of hands, bilateral [M19.041, M19.042] Arthritis of carpometacarpal (CMC) joints of both thumbs [M18.0] Postoperative Diagnosis: LEFT CARPAL TUNNEL, ARTHRITIS CARPOMETACARPAL JOINT LEFT THUMB, MASS LEFT LONG FINGER Procedure(s): LEFT HAND CARPAL TUNNEL RELEASE 
LEFT WRIST TRAPEZIO METACARPAL  ARTHROPLASTY WITH FCR TENDON TRANSFER  
EXCISION G ANGLION of FLEXOR TENDON SHEATH LEFT LONG FINGER Surgeon(s) and Role: Claude Alvarez MD - Primary Surgical Assistant: NONE Surgical Staff: 
Circ-1: Bety Louie RN Scrub Tech-1: Jason Stevens Event Time In Incision Start [phone] Incision Close 1424 Anesthesia: Regional  
Estimated Blood Loss: MINIMAL Specimens: * No specimens in log * Findings: SEVERE DEGENERATIVE ARTHRITIS OF THE TM JOINT Complications: none Implants: * No implants in log *

## 2018-09-21 NOTE — ANESTHESIA POSTPROCEDURE EVALUATION
Post-Anesthesia Evaluation and Assessment Patient: Peyton Hector MRN: [id number]  SSN: [id number] YOB: 1960  Age: 65 y.o. Sex: female Cardiovascular Function/Vital Signs Visit Vitals  /63  Pulse 85  Temp 36.4 °C (97.5 °F)  Resp 16  Wt 70.8 kg (156 lb)  SpO2 90%  BMI 28.53 kg/m2 Patient is status post total IV anesthesia, general - backup anesthesia for Procedure(s): LEFT HAND CARPAL TUNNEL RELEASE 
LEFT WRIST TRAPEZIO METACARPAL  ARTHROPLASTY WITH FCR TENDON TRANSFER  
EXCISION MASS LEFT LONG FINGER. Nausea/Vomiting: None Postoperative hydration reviewed and adequate. Pain: 
Pain Scale 1: Visual (09/21/18 1431) Pain Intensity 1: 0 (09/21/18 1431) Managed Neurological Status:  
Neuro (WDL): Exceptions to WDL (09/21/18 1431) Neuro Neurologic State: Drowsy (09/21/18 1431) LUE Motor Response: Numbness; Pharmacologically paralyzed (09/21/18 1431) At baseline Mental Status and Level of Consciousness: Arousable Pulmonary Status:  
O2 Device: Room air (09/21/18 1448) Adequate oxygenation and airway patent Complications related to anesthesia: None Post-anesthesia assessment completed. No concerns Signed By: Serjio Ahuja MD   
 September 21, 2018

## 2018-09-21 NOTE — ANESTHESIA PREPROCEDURE EVALUATION
Anesthetic History No history of anesthetic complications Review of Systems / Medical History Patient summary reviewed and pertinent labs reviewed Pulmonary COPD (3L qhs, daily MDI): moderate Neuro/Psych Psychiatric history Comments: Fibromyalgia Chronic pain Cardiovascular Hypertension: well controlled Hyperlipidemia Exercise tolerance: >4 METS 
  
GI/Hepatic/Renal 
  
GERD: well controlled Endo/Other Hypothyroidism: well controlled Arthritis Other Findings Physical Exam 
 
Airway Mallampati: II 
TM Distance: 4 - 6 cm Neck ROM: normal range of motion Mouth opening: Normal 
 
 Cardiovascular Rhythm: regular Rate: normal 
 
 
 
 Dental 
 
Dentition: Full upper dentures Pulmonary Breath sounds clear to auscultation Abdominal 
 
 
 
 Other Findings Anesthetic Plan ASA: 3 Anesthesia type: total IV anesthesia and general - backup Post-op pain plan if not by surgeon: peripheral nerve block single Anesthetic plan and risks discussed with: Patient

## 2018-09-21 NOTE — DISCHARGE INSTRUCTIONS
POST OP INSTRUCTIONS      1. Patient has appointment for 10/1/18 at the [address] [place] [address]. 2.  For problems call Dr Crista Wells, [facility],  [phone]          Appointments only,  [phone]    3. Ice and elevate the surgical site. 4.  Keep splints and dressing dry and intact. 5.  If able to tolerate, take   Acetaminophen 325 mg  2 every 4 hrs   And                                               Ibuprofen 200 mg   3 every 6 hrs   OR   Aleve 220 mg 2 every 12 hrs to help with pain                                                   ( Do not take Ibuprofen or Aleve if taking any other anti inflamatory                                                    drug, NSAID, or anti arthritis drug or if taking blood thinners.)                                                 Vitamin C   500 mg  Once a day for 2 months. TYPICAL SIDE EFFECTS OF PAIN MEDICATION:  *    Constipation: Drink lots of fluids. Over the counter stool softener if needed. *    Nausea: Take pain medication with food. Call your doctor with persistent nausea. ACTIVITY  · As tolerated and as directed by your doctor. · Bathe or shower as directed by your doctor. DIET  · Day of surgery: Clear liquids until no nausea or vomiting; small portion, light diet Bailey foods (ex: baked chicken, plain rice, grits, scrambled eggs, toast). Nothing greasy, fried or spicy today. · Advance to regular diet on second day, unless your doctor orders otherwise. · If nausea and vomiting continues, call your doctor. PAIN  · Take pain medication as directed by your doctor. · DO NOT take aspirin or blood thinners unless directed by your doctor.        CALL YOUR DOCTOR IF    s Call your doctor if pain is NOT relieved by medication.   s Excessive bleeding that does not stop after holding pressure over the area  · Temperature of 101 degrees F or above  · Excessive redness, swelling or bruising, and/ or green or yellow, smelly discharge from incision    AFTER ANESTHESIA   · For the first 24 hours: DO NOT Drive, Drink alcoholic beverages, or Make important decisions. · Be aware of dizziness following anesthesia and while taking pain medication. DISCHARGE SUMMARY from Nurse    PATIENT INSTRUCTIONS:    After general anesthesia or intravenous sedation, for 24 hours or while taking prescription Narcotics:  · Limit your activities  · Do not drive and operate hazardous machinery  · Do not make important personal or business decisions  · Do  not drink alcoholic beverages  · If you have not urinated within 8 hours after discharge, please contact your surgeon on call. *  Please give a list of your current medications to your Primary Care Provider. *  Please update this list whenever your medications are discontinued, doses are      changed, or new medications (including over-the-counter products) are added. *  Please carry medication information at all times in case of emergency situations. Preventing Infection at Home  We care about preventing infection and avoiding the spread of germs - not only when you are in the hospital but also when you return home. When you return home from the hospital, its important to take the following steps to help prevent infection and avoid spreading germs that could infect you and others. Ask everyone in your home to follow these guidelines, too. Clean Your Hands  · Clean your hands whenever your hands are visibly dirty, before you eat, before or after touching your mouth, nose or eyes, and before preparing food. Clean them after contact with body fluids, using the restroom, touching animals or changing diapers. · When washing hands, wet them with warm water and work up a lather. Rub hands for at least 15 seconds, then rinse them and pat them dry with a clean towel or paper towel.   · When using hand sanitizers, it should take about 15 seconds to rub your hands dry. If not, you probably didnt apply enough . Cover Your Sneeze or Cough  Germs are released into the air whenever you sneeze or cough. To prevent the spread of infection:  · Turn away from other people before coughing or sneezing. · Cover your mouth or nose with a tissue when you cough or sneeze. Put the tissue in the trash. · If you dont have a tissue, cough or sneeze into your upper sleeve, not your hands. · Always clean your hands after coughing or sneezing. Care for Wounds  Your skin is your bodys first line of defense against germs, but an open wound leaves an easy way for germs to enter your body. To prevent infection:  · Clean your hands before and after changing wound dressings, and wear gloves to change dressings if recommended by your doctor. · Take special care with IV lines or other devices inserted into the body. If you must touch them, clean your hands first.  · Follow any specific instructions from your doctor to care for your wounds. Contact your doctor if you experience any signs of infection, such as fever or increased redness at the surgical or wound site. Keep a Clean Home  · Clean or wipe commonly touched hard surfaces like door handles, sinks, tabletops, phones and TV remotes. · Use products labeled disinfectant to kill harmful bacteria and viruses. · Use a clean cloth or paper towel to clean and dry surfaces. Wiping surfaces with a dirty dishcloth, sponge or towel will only spread germs. · Never share toothbrushes, mg, drinking glasses, utensils, razor blades, face cloths or bath towels to avoid spreading germs. · Be sure that the linens that you sleep on are clean. · Keep pets away from wounds and wash your hands after touching pets, their toys or bedding. We care about you and your health. Remember, preventing infections is a team effort between you, your family, friends and health care providers.        These are general instructions for a healthy lifestyle:    No smoking/ No tobacco products/ Avoid exposure to second hand smoke    Surgeon General's Warning:  Quitting smoking now greatly reduces serious risk to your health. Obesity, smoking, and sedentary lifestyle greatly increases your risk for illness    A healthy diet, regular physical exercise & weight monitoring are important for maintaining a healthy lifestyle    You may be retaining fluid if you have a history of heart failure or if you experience any of the following symptoms:  Weight gain of 3 pounds or more overnight or 5 pounds in a week, increased swelling in our hands or feet or shortness of breath while lying flat in bed. Please call your doctor as soon as you notice any of these symptoms; do not wait until your next office visit. Recognize signs and symptoms of STROKE:    F-face looks uneven    A-arms unable to move or move unevenly    S-speech slurred or non-existent    T-time-call 911 as soon as signs and symptoms begin-DO NOT go       Back to bed or wait to see if you get better-TIME IS BRAIN.

## 2018-09-21 NOTE — PERIOP NOTES
Patient's mother and patient advised for patient to stop taking Morphine CR 15 mg and Tramadol 50 mg while taking oxycodone prescribed by Dr. Bro Ram. Mother and patient voiced understanding.

## 2018-09-22 NOTE — OP NOTES
[facility] REPORT    Yanna Claudio  MR#: [id number]  : 1960  ACCOUNT #: [de-identified]   DATE OF SERVICE: 2018    PREOPERATIVE DIAGNOSES:   1. Degenerative arthritis of the trapeziometacarpal joint of the left thumb. 2.  Left carpal tunnel syndrome. 3.  Ganglion cyst of the flexor tendon sheath of the left long finger. POSTOPERATIVE DIAGNOSES:  1. Degenerative arthritis of the trapeziometacarpal joint of the left thumb. 2.  Left carpal tunnel syndrome. 3.  Ganglion cyst of the flexor tendon sheath of the left long finger. PROCEDURES PERFORMED:  1. Left trapeziometacarpal arthroplasty. 2.  Transfer of the left flexor carpi radialis tendon of the wrist and hand. 3.  Left carpal tunnel release. 4.  Excision of ganglion of flexor tendon sheath of the left long finger. SURGEON:  RODRIGO Espinal MD    ANESTHESIA:  Axillary block. PROCEDURAL NOTE:  The patient under an axillary block anesthetic, the left upper extremity was prepped with ChloraPrep and draped as a sterile field. A timeout was held identifying the patient, surgeon, procedure and operative site. The patient had been given preoperative IV Ancef prophylactically. The left upper extremity was exsanguinated with an Esmarch bandage and the pneumatic tourniquet inflated to 250 mmHg around the upper arm. A curved incision was made along the volar radial aspect of the base of the left thumb metacarpal and over the trapezium and the scaphoid back to the FCR tendon sheath. This was carried down through the skin and subcutaneous tissue. Small bleeders were electrocoagulated. The thenar muscles were taken down off of the base of the thumb metacarpal and the trapezium and retracted palmar lackey. There was noted to be some bulging of the metacarpal trapezial joint. When this was opened, there was fluid that escaped.   The joint surfaces showed loss of articular cartilage bone spurring a small cartilaginous loose body adherent to the capsule that was excised. The FCR sheath was opened at the wrist and the FCR tendon released from its sheath down to the bony overhang of the trapezium. This was then freed and the overlying bone removed with a small rongeur. The capsular structures around the trapezium were then sharply released with the scalpel working as far dorsally and palmarward as possible. The trapezium was then cut into 4 quadrants using oscillating saw. These were then removed with a rongeur continuing to release the ligament as these were rotated and peeled out of the joint. The base of the thumb metacarpal was then further exposed with a subperiosteal dissection with small retractors holding soft tissues out of the way. A 4 mm bur was used to create an oval opening in the dorsum of the base of the metacarpal and out through the volar margin of the articular surface. Small rimming osteophytes were removed with the rongeur as well. The wound was then thoroughly irrigated with saline, removing the bone debris. A separate incision was made then in the mid forearm along the FCR tendon at about the expected musculotendinous junction. This was carried down through the skin and subcutaneous tissues. Small bleeders were electrocoagulated. The FCR tendon was identified and using both the small scissors and the [facility] elevator, the tendon was elevated. A 0 PDS suture was then passed through the mid portion of the tendon. The suture was clamped and cut. A Urban tendon passing forceps was then placed up the tendon sheath from the primary incision at the wrist and out through the secondary incision in the forearm. This was used to grasp the 2 ends of the suture and pulled those into the distal wound. These were clamped with a hemostat then by pulling on the 2 ends of the suture the tendon was split longitudinally.   The ulnar half was transected through the proximal incision and then withdrawn into the distal wound. A tag stitch of 2-0 PDS was placed in the free end of the tendon and it was further split down to it and some insertion on the base of the index metacarpal.  The free end of the tendon was passed through the opening in the base of the metacarpal and out through the dorsal opening. With the assistant holding the thumb in an abducted and extended position with traction on it the tendon was pulled back and sutured to itself with 3 horizontal mattress sutures of 2-0 Ethibond. This created a sling supporting the base of the thumb metacarpal.  The remaining portion of the tendon was then rolled into a bolus with a 2-0 PDS and sutured into the space where the trapezium had been removed. The wound was again irrigated. The capsular structures were closed back with inverted sutures of the 2-0 Ethibond and with 2-0 PDS. Skin was closed in both incisions with 4-0 nylon. Carpal tunnel release was done, then making a small curved incision in the proximal palm and carried it down through the skin and subcutaneous tissues. The palmar fascia was opened longitudinally and a small self-retaining retractor inserted. The iSpye indianatome system was utilized for the release. This was first passed beneath the distal margin of the transverse carpal ligament and beneath the ligament into the carpal tunnel. This was followed by the stripper instrument and then double  and finally the ligamentatome used to release the ligament and a single smooth pass from distally to proximally. No gross abnormalities were noted in the carpal tunnel. The wound was irrigated and then closed with 4-0 nylon horizontal mattress sutures. Turning to the palpable mass that was present just distal to the palmar digital crease at the base of the long finger a transverse incision was made over it and spreading dissection used down to the flexor tendon sheath.   There was approximately a 2-3 mm diameter firm mass arising from the A2 pulley. This was excised leaving the majority of the A2 pulley intact. No other abnormalities were noted. This incision was also closed with 4-0 nylon. Sterile dressings of Adaptic gauze, ABD pads and Kerlix were applied followed by Ace wrap creating a bulky dressing supporting the thumb and wrist.    ASSISTANT:  None. ESTIMATED BLOOD LOSS:  Minimal.    IMPLANTS:  None. SPECIMENS REMOVED:  Tissue removed for histologic exam:  None. COMPLICATIONS:  None. The patient tolerated the procedure well and was sent to recovery room in good condition. MD SHONDA Jenkins / BING  D: 09/21/2018 15:00     T: 09/21/2018 15:41  JOB #: [id number]  CC: RODRIGO Snyder MD

## 2018-10-16 PROBLEM — M26.629 TMJ SYNDROME: Chronic | Status: ACTIVE | Noted: 2018-10-16

## 2018-10-16 PROBLEM — F41.9 ANXIETY: Chronic | Status: ACTIVE | Noted: 2018-10-16

## 2019-02-16 ENCOUNTER — HOSPITAL ENCOUNTER (OUTPATIENT)
Dept: MAMMOGRAPHY | Age: 59
Discharge: HOME | End: 2019-02-16
Payer: MEDICAID

## 2019-02-16 DIAGNOSIS — Z12.39 SCREENING BREAST EXAMINATION: ICD-10-CM

## 2019-02-16 PROCEDURE — 77063 BREAST TOMOSYNTHESIS BI: CPT

## 2019-02-27 ENCOUNTER — HOSPITAL ENCOUNTER (OUTPATIENT)
Dept: OCCUPATIONAL MEDICINE | Age: 59
Discharge: HOME | End: 2019-02-27
Payer: MEDICAID

## 2019-02-27 LAB
FLUAV AG NPH QL IA: NEGATIVE
FLUBV AG NPH QL IA: NEGATIVE
SPECIMEN SOURCE: NORMAL

## 2019-02-27 PROCEDURE — 87804 INFLUENZA ASSAY W/OPTIC: CPT

## 2019-03-15 ENCOUNTER — HOSPITAL ENCOUNTER (OUTPATIENT)
Dept: CT IMAGING | Age: 59
Discharge: HOME | End: 2019-03-15
Attending: INTERNAL MEDICINE
Payer: MEDICAID

## 2019-03-15 DIAGNOSIS — Z87.891 HISTORY OF TOBACCO ABUSE: ICD-10-CM

## 2019-03-15 DIAGNOSIS — J44.9 CHRONIC OBSTRUCTIVE PULMONARY DISEASE, UNSPECIFIED COPD TYPE (HCC): Chronic | ICD-10-CM

## 2019-03-15 PROCEDURE — G0297 LDCT FOR LUNG CA SCREEN: HCPCS

## 2019-04-14 ENCOUNTER — ANESTHESIA EVENT (OUTPATIENT)
Dept: ENDOSCOPY | Age: 59
End: 2019-04-14
Payer: MEDICAID

## 2019-04-15 ENCOUNTER — ANESTHESIA (OUTPATIENT)
Dept: ENDOSCOPY | Age: 59
End: 2019-04-15
Payer: MEDICAID

## 2019-04-15 ENCOUNTER — HOSPITAL ENCOUNTER (OUTPATIENT)
Age: 59
Setting detail: OUTPATIENT SURGERY
Discharge: HOME | End: 2019-04-15
Attending: INTERNAL MEDICINE | Admitting: INTERNAL MEDICINE
Payer: MEDICAID

## 2019-04-15 VITALS
WEIGHT: 155 LBS | TEMPERATURE: 98.6 F | OXYGEN SATURATION: 100 % | DIASTOLIC BLOOD PRESSURE: 66 MMHG | BODY MASS INDEX: 28.52 KG/M2 | HEIGHT: 62 IN | SYSTOLIC BLOOD PRESSURE: 111 MMHG | HEART RATE: 88 BPM | RESPIRATION RATE: 18 BRPM

## 2019-04-15 PROCEDURE — 76040000025: Performed by: INTERNAL MEDICINE

## 2019-04-15 PROCEDURE — 74011250636 HC RX REV CODE- 250/636: Performed by: ANESTHESIOLOGY

## 2019-04-15 PROCEDURE — 76060000032 HC ANESTHESIA 0.5 TO 1 HR: Performed by: INTERNAL MEDICINE

## 2019-04-15 PROCEDURE — 74011250636 HC RX REV CODE- 250/636

## 2019-04-15 RX ORDER — ONDANSETRON 2 MG/ML
4 INJECTION INTRAMUSCULAR; INTRAVENOUS ONCE
Status: DISCONTINUED | OUTPATIENT
Start: 2019-04-15 | End: 2019-04-15 | Stop reason: HOSPADM

## 2019-04-15 RX ORDER — PROPOFOL 10 MG/ML
INJECTION, EMULSION INTRAVENOUS
Status: DISCONTINUED | OUTPATIENT
Start: 2019-04-15 | End: 2019-04-15 | Stop reason: HOSPADM

## 2019-04-15 RX ORDER — PROPOFOL 10 MG/ML
INJECTION, EMULSION INTRAVENOUS AS NEEDED
Status: DISCONTINUED | OUTPATIENT
Start: 2019-04-15 | End: 2019-04-15 | Stop reason: HOSPADM

## 2019-04-15 RX ORDER — MIDAZOLAM HYDROCHLORIDE 1 MG/ML
2 INJECTION, SOLUTION INTRAMUSCULAR; INTRAVENOUS
Status: DISCONTINUED | OUTPATIENT
Start: 2019-04-15 | End: 2019-04-15 | Stop reason: HOSPADM

## 2019-04-15 RX ORDER — LIDOCAINE HYDROCHLORIDE 10 MG/ML
0.1 INJECTION INFILTRATION; PERINEURAL AS NEEDED
Status: DISCONTINUED | OUTPATIENT
Start: 2019-04-15 | End: 2019-04-15 | Stop reason: HOSPADM

## 2019-04-15 RX ORDER — ACETAMINOPHEN 500 MG
500 TABLET ORAL ONCE
Status: DISCONTINUED | OUTPATIENT
Start: 2019-04-15 | End: 2019-04-15 | Stop reason: HOSPADM

## 2019-04-15 RX ORDER — NALOXONE HYDROCHLORIDE 0.4 MG/ML
0.1 INJECTION, SOLUTION INTRAMUSCULAR; INTRAVENOUS; SUBCUTANEOUS AS NEEDED
Status: DISCONTINUED | OUTPATIENT
Start: 2019-04-15 | End: 2019-04-15 | Stop reason: HOSPADM

## 2019-04-15 RX ORDER — SODIUM CHLORIDE, SODIUM LACTATE, POTASSIUM CHLORIDE, CALCIUM CHLORIDE 600; 310; 30; 20 MG/100ML; MG/100ML; MG/100ML; MG/100ML
1000 INJECTION, SOLUTION INTRAVENOUS CONTINUOUS
Status: DISCONTINUED | OUTPATIENT
Start: 2019-04-15 | End: 2019-04-15 | Stop reason: HOSPADM

## 2019-04-15 RX ORDER — LIDOCAINE HYDROCHLORIDE 20 MG/ML
INJECTION, SOLUTION EPIDURAL; INFILTRATION; INTRACAUDAL; PERINEURAL AS NEEDED
Status: DISCONTINUED | OUTPATIENT
Start: 2019-04-15 | End: 2019-04-15 | Stop reason: HOSPADM

## 2019-04-15 RX ORDER — SODIUM CHLORIDE, SODIUM LACTATE, POTASSIUM CHLORIDE, CALCIUM CHLORIDE 600; 310; 30; 20 MG/100ML; MG/100ML; MG/100ML; MG/100ML
75 INJECTION, SOLUTION INTRAVENOUS CONTINUOUS
Status: DISCONTINUED | OUTPATIENT
Start: 2019-04-15 | End: 2019-04-15 | Stop reason: HOSPADM

## 2019-04-15 RX ORDER — DIPHENHYDRAMINE HYDROCHLORIDE 50 MG/ML
12.5 INJECTION, SOLUTION INTRAMUSCULAR; INTRAVENOUS ONCE
Status: DISCONTINUED | OUTPATIENT
Start: 2019-04-15 | End: 2019-04-15 | Stop reason: HOSPADM

## 2019-04-15 RX ORDER — MORPHINE SULFATE 20 MG/1
20 CAPSULE, EXTENDED RELEASE ORAL EVERY 24 HOURS
COMMUNITY

## 2019-04-15 RX ORDER — FENTANYL CITRATE 50 UG/ML
100 INJECTION, SOLUTION INTRAMUSCULAR; INTRAVENOUS AS NEEDED
Status: DISCONTINUED | OUTPATIENT
Start: 2019-04-15 | End: 2019-04-15 | Stop reason: HOSPADM

## 2019-04-15 RX ADMIN — PROPOFOL 180 MCG/KG/MIN: 10 INJECTION, EMULSION INTRAVENOUS at 11:38

## 2019-04-15 RX ADMIN — LIDOCAINE HYDROCHLORIDE 50 MG: 20 INJECTION, SOLUTION EPIDURAL; INFILTRATION; INTRACAUDAL; PERINEURAL at 11:36

## 2019-04-15 RX ADMIN — PROPOFOL 50 MG: 10 INJECTION, EMULSION INTRAVENOUS at 11:36

## 2019-04-15 RX ADMIN — PROPOFOL 30 MG: 10 INJECTION, EMULSION INTRAVENOUS at 11:38

## 2019-04-15 RX ADMIN — SODIUM CHLORIDE, SODIUM LACTATE, POTASSIUM CHLORIDE, AND CALCIUM CHLORIDE 1000 ML: 600; 310; 30; 20 INJECTION, SOLUTION INTRAVENOUS at 11:03

## 2019-04-15 NOTE — PROGRESS NOTES
's visit attempted in Endoscopy prep. Ms. Elsa Cm was not present. Michael Bermudez MDiv Board Certified [employer]

## 2019-04-15 NOTE — ANESTHESIA PREPROCEDURE EVALUATION
Anesthetic History No history of anesthetic complications Review of Systems / Medical History Patient summary reviewed and pertinent labs reviewed Pulmonary COPD (3L qhs, daily MDI): moderate Neuro/Psych Psychiatric history Comments: Fibromyalgia Chronic pain Cardiovascular Hypertension: well controlled Hyperlipidemia Exercise tolerance: >4 METS 
  
GI/Hepatic/Renal 
  
GERD: well controlled Endo/Other Hypothyroidism: well controlled Arthritis Other Findings Physical Exam 
 
Airway Mallampati: II 
TM Distance: 4 - 6 cm Neck ROM: normal range of motion Mouth opening: Normal 
 
 Cardiovascular Rhythm: regular Rate: normal 
 
 
 
 Dental 
 
Dentition: Full upper dentures Pulmonary Breath sounds clear to auscultation Abdominal 
 
 
 
 Other Findings Anesthetic Plan ASA: 3 Anesthesia type: total IV anesthesia Anesthetic plan and risks discussed with: Patient

## 2019-04-15 NOTE — DISCHARGE INSTRUCTIONS
Gastrointestinal Esophagogastroduodenoscopy (EGD) - Upper Exam Discharge Instructions    1. Call Dr. Susi Vale at [phone] for any problems or questions. 2. Contact the doctor's office for follow up appointment as directed. 3. Medication may cause drowsiness for several hours, therefore:  · Do not drive or operate machinery for remainder of the day. · No alcohol today. · Do not make any important or legal decisions for 24 hours. · Do not sign any legal documents for 24 hours. 5. Ordinarily, you may resume regular diet and activity after exam unless otherwise specified by your physician. 6. For mild soreness in your throat you may use throat lozenges or warm  salt-water gargles as needed. Gastrointestinal Colonoscopy/Flexible Sigmoidoscopy - Lower Exam Discharge Instructions  1. Call Dr. Susi Vale at [phone] for any problems or questions. 2. Contact the doctors office for follow up appointment as directed  3. Medication may cause drowsiness for several hours, therefore:  · Do not drive or operate machinery for reminder of the day. · No alcohol today. · Do not make any important or legal decisions for 24 hours. · Do not sign any legal documents for 24 hours. 4. Ordinarily, you may resume regular diet and activity after exam unless otherwise specified by your physician. 5. Because of air put into your colon during exam, you may experience some abdominal distension, relieved by the passage of gas, for several hours. 6. Contact your physician if you have any of the following:  · Excessive amount of bleeding - large amount when having a bowel movement. Occasional specks of blood with bowel movement would not be unusual.  · Severe abdominal pain  · Fever or Chills  7. Polyp Removal - follow these additional instructions  · No Aspirin, Advil, Aleve, Nuprin, Ibuprofen, or medications that contain these drugs for 2 weeks. Any additional instructions:   Follow up as needed

## 2019-04-15 NOTE — ROUTINE PROCESS
Pt. Discharged to car by Elia Rosario with family . Vital signs stable. Able to tolerate PO fluids. Passing gas.  Seen by MD.

## 2019-04-15 NOTE — H&P
History and Physical for Outpatient Procedures Date: 4/15/2019 History of Present Illness:  Patient presents to undergo EGD and colonoscopy. Patient has complaints of dysphagia as well as history of colon polyps Past Medical History:  
Diagnosis Date  Anemia  Arrhythmia Diagnosed 2010, patient believes now resolved. Patient has no cardiologist  
 Arthritis Osteoarthritis diagnosed [id number]  Asthma Diagnosed- can't recall.  Carpal tunnel syndrome   
 left  Chronic obstructive pulmonary disease ([address]  Chronic pain   
 diagnosed 1994 severe fibromyalgia and 2001 infarcts pleurisy from PE.  
 DDD (degenerative disc disease), cervical   
 Depression Diagnosed 2002  Dysphagia  Facial abscess  Factor 5 Leiden mutation, heterozygous ([address]  Fibromyalgia  Gait instability  GERD (gastroesophageal reflux disease) Diagnosed 2001.  Hypercholesterolemia Diagnosed 2011.  Hypertension Diagnose 2001  Lung nodule  Multiple falls  Other ill-defined conditions(799.89) IBS< Sherlean Nova  PTSD (post-traumatic stress disorder)  Scoliosis  Thromboembol[address] [address] Diagnosed 2001 DVT  Thyroid disease   
 hypothryroidism diagnosed approx 2013.  Trauma PTSD diagnosed 1999. Past Surgical History:  
Procedure Laterality Date  HX GYN    
 2 D&C due to miscarriages. Year 1984 and 1992.  HX HEENT  1978  
 3 lumps removed from neck  HX ORTHOPAEDIC Right 2013  
 transfer tendon  HX ORTHOPAEDIC Right   
 trigger fingers  HX ORTHOPAEDIC Right   
 carpal tunel  HX ORTHOPAEDIC Left 2018  
 carpal tunnel x 2 Family History Problem Relation Age of Onset  Clotting Disorder Maternal Aunt  Cancer Maternal Uncle  Cancer Maternal Grandmother  Clotting Disorder Maternal Grandfather  Heart Disease Maternal Grandfather  Cancer Maternal Grandfather  Clotting Disorder Mother  No Known Problems Father  No Known Problems Brother  No Known Problems Paternal Grandmother  No Known Problems Paternal Grandfather Social History Tobacco Use  Smoking status: Former Smoker Packs/day: 1.00 Years: 30.00 Pack years: 30.00 Types: Cigarettes Last attempt to quit: 2016 Years since quittin.6  Smokeless tobacco: Never Used Substance Use Topics  Alcohol use: No  
  Alcohol/week: 0.0 oz  
  Comment: Once a year. Allergies Allergen Reactions  Ciprofloxacin Anaphylaxis  Codeine Other (comments)  
  syncopal  
 
No current facility-administered medications for this encounter. Review of Systems: A detailed 10 organ review of systems is obtained with pertinent positives as listed in the History of Present Illness. All others are negative. Objective:  
 
Physical Exam: 
Visit Vitals Temp 98.6 °F (37 °C) Ht 5' 2\" (1.575 m) Wt 70.3 kg (155 lb) BMI 28.35 kg/m² General:  Alert and oriented. Heart: Regular rate and rhythm Lungs:  Clear to auscultation bilaterally Abdomen: Soft, nontender, nondistended Impression/Plan:  
 
Proceed with EGD with dilation and colonoscopy as planned. I have discussed with the patient the technique, benefits, alternatives, and risks of these procedures, including medication reaction, immediate or delayed bleeding, or perforation of the gastrointestinal tract. Signed By: Maureen Henry MD   
 April 15, 2019

## 2019-04-15 NOTE — PROCEDURES
Endoscopic Gastroduodenoscopy and Colonoscopy Procedure Note    Indications:   EGD:   1. Dysphagia    Colonoscopy:   1. History of colon polyps    Anesthesia/Sedation: MAC IV       Procedure Details:  Informed consent was obtained for the procedure, including sedation risk. Risks of pancreatitis, infection, perforation, hemorrhage, adverse drug reaction and aspiration were discussed. The EGD gastroscope was inserted into the mouth and advanced under direct vision to the second portion of the duodenum. A careful inspection was made as the gastroscope was withdrawn, including a retroflexed view of the proximal stomach; findings and interventions are described below. The patient was placed in the left lateral decubitus position. A rectal examination was performed. The adult colonoscope was inserted into the rectum and advanced under direct vision to the cecum. The quality of the colonic preparation was good. A careful inspection was made as the colonoscope was withdrawn, including a retroflexed view of the rectum; findings and interventions are described below. Findings:       EGD:    ESOPHAGUS: Unremarkable. Empiric dilation performed with 52 Fr Elliott dilator. Mild resistance felt. STOMACH: Unremarkable on forward and retroflexed views. DUODENUM: Unremarkable       Colonoscopy:  Anus: Unremarkable  Rectum:  Poor prep  Sigmoid: Poor prep  Descending Colon: Poor prep   Transverse Colon: Poor prep  Ascending Colon: Not examined Poor Prep  Cecum:  Not examined Poor prep  Terminal ileum: Not examined Poor prep    Specimens:   1. None    Estimated Blood Loss: 0-min cc           Complications:   None; patient tolerated the procedure well. Attending Attestation:  I performed the procedure. Impression:    1. Unremarkable upper endoscopy  2. Empiric dilation performed  3. Poor prep    Recommendations:  1. Repeat colonoscopy with 2 day prep in the next few months  2.  Follow up in the office in 2 months      Tyrone Monge MD  [facility], [place]

## 2019-04-15 NOTE — ANESTHESIA POSTPROCEDURE EVALUATION
Procedure(s): ESOPHAGOGASTRODUODENOSCOPY (EGD) COLONOSCOPY BMI 30 ESOPHAGEAL DILATION. total IV anesthesia Anesthesia Post Evaluation Patient location during evaluation: bedside Patient participation: complete - patient participated Level of consciousness: responsive to verbal stimuli Pain management: satisfactory to patient Airway patency: patent Anesthetic complications: no 
Cardiovascular status: hemodynamically stable Respiratory status: spontaneous ventilation Hydration status: stable Vitals Value Taken Time /62 4/15/2019 12:29 PM  
Temp Pulse 89 4/15/2019 12:29 PM  
Resp 18 4/15/2019 12:29 PM  
SpO2 97 % 4/15/2019 12:36 PM  
Vitals shown include unvalidated device data.

## 2019-04-19 PROBLEM — K58.1 IRRITABLE BOWEL SYNDROME WITH CONSTIPATION: Status: ACTIVE | Noted: 2019-04-19

## 2019-04-19 PROBLEM — K58.0 IRRITABLE BOWEL SYNDROME WITH DIARRHEA: Status: ACTIVE | Noted: 2019-04-19

## 2019-04-23 ENCOUNTER — HOSPITAL ENCOUNTER (OUTPATIENT)
Dept: PET IMAGING | Age: 59
Discharge: HOME | End: 2019-04-23
Payer: MEDICAID

## 2019-04-23 DIAGNOSIS — R91.1 LUNG NODULE: ICD-10-CM

## 2019-04-23 PROCEDURE — A9552 F18 FDG: HCPCS

## 2019-04-23 PROCEDURE — 74011636320 HC RX REV CODE- 636/320: Performed by: NURSE PRACTITIONER

## 2019-04-23 RX ORDER — SODIUM CHLORIDE 0.9 % (FLUSH) 0.9 %
10 SYRINGE (ML) INJECTION
Status: COMPLETED | OUTPATIENT
Start: 2019-04-23 | End: 2019-04-23

## 2019-04-23 RX ADMIN — Medication 10 ML: at 11:48

## 2019-04-23 RX ADMIN — DIATRIZOATE MEGLUMINE AND DIATRIZOATE SODIUM 10 ML: 660; 100 LIQUID ORAL; RECTAL at 11:48

## 2019-04-24 NOTE — PROGRESS NOTES
Pt's mother was notified that the Pet is negative. She was told a follow up CT in 6 months will be scheduled. Order will be placed.

## 2019-04-24 NOTE — PROGRESS NOTES
Please let patient know the good news that PET is negative! She needs follow up CT of chest without contrast for her nodules in 6 months. Thank you.

## 2019-04-30 ENCOUNTER — HOSPITAL ENCOUNTER (OUTPATIENT)
Dept: LAB | Age: 59
Discharge: HOME | End: 2019-04-30
Payer: MEDICAID

## 2019-04-30 DIAGNOSIS — R50.9 COUGH WITH FEVER: ICD-10-CM

## 2019-04-30 DIAGNOSIS — R05.9 COUGH WITH FEVER: ICD-10-CM

## 2019-04-30 PROCEDURE — 87070 CULTURE OTHR SPECIMN AEROBIC: CPT

## 2019-04-30 PROCEDURE — 87186 SC STD MICRODIL/AGAR DIL: CPT

## 2019-04-30 PROCEDURE — 87077 CULTURE AEROBIC IDENTIFY: CPT

## 2019-05-01 NOTE — PROGRESS NOTES
Please let patient know that bacterial cultures are positive. Would recommend bactrim DS bid x 10 days. Thank you.

## 2019-05-03 LAB
BACTERIA SPEC CULT: ABNORMAL
GRAM STN SPEC: ABNORMAL
SERVICE CMNT-IMP: ABNORMAL

## 2019-05-14 ENCOUNTER — APPOINTMENT (OUTPATIENT)
Dept: CT IMAGING | Age: 59
End: 2019-05-14
Attending: EMERGENCY MEDICINE
Payer: MEDICAID

## 2019-05-14 ENCOUNTER — HOSPITAL ENCOUNTER (EMERGENCY)
Age: 59
Discharge: HOME | End: 2019-05-14
Attending: EMERGENCY MEDICINE
Payer: MEDICAID

## 2019-05-14 ENCOUNTER — APPOINTMENT (OUTPATIENT)
Dept: GENERAL RADIOLOGY | Age: 59
End: 2019-05-14
Attending: EMERGENCY MEDICINE
Payer: MEDICAID

## 2019-05-14 VITALS
HEART RATE: 70 BPM | DIASTOLIC BLOOD PRESSURE: 70 MMHG | BODY MASS INDEX: 28.71 KG/M2 | RESPIRATION RATE: 18 BRPM | SYSTOLIC BLOOD PRESSURE: 116 MMHG | WEIGHT: 156 LBS | OXYGEN SATURATION: 98 % | HEIGHT: 62 IN | TEMPERATURE: 98.5 F

## 2019-05-14 DIAGNOSIS — J42 CHRONIC BRONCHITIS WITH ACUTE EXACERBATION (HCC): Primary | ICD-10-CM

## 2019-05-14 DIAGNOSIS — J20.9 CHRONIC BRONCHITIS WITH ACUTE EXACERBATION (HCC): Primary | ICD-10-CM

## 2019-05-14 LAB
ALBUMIN SERPL-MCNC: 3.6 G/DL (ref 3.5–5)
ALBUMIN/GLOB SERPL: 1.2 {RATIO} (ref 1.2–3.5)
ALP SERPL-CCNC: 78 U/L (ref 50–136)
ALT SERPL-CCNC: 25 U/L (ref 12–65)
ANION GAP SERPL CALC-SCNC: 5 MMOL/L (ref 7–16)
APPEARANCE UR: CLEAR
AST SERPL-CCNC: 26 U/L (ref 15–37)
BACTERIA URNS QL MICRO: ABNORMAL /HPF
BASOPHILS # BLD: 0 K/UL (ref 0–0.2)
BASOPHILS NFR BLD: 1 % (ref 0–2)
BILIRUB SERPL-MCNC: 0.3 MG/DL (ref 0.2–1.1)
BILIRUB UR QL: NEGATIVE
BUN SERPL-MCNC: 12 MG/DL (ref 6–23)
CALCIUM SERPL-MCNC: 8.4 MG/DL (ref 8.3–10.4)
CASTS URNS QL MICRO: ABNORMAL /LPF
CHLORIDE SERPL-SCNC: 104 MMOL/L (ref 98–107)
CO2 SERPL-SCNC: 30 MMOL/L (ref 21–32)
COLOR UR: YELLOW
CREAT SERPL-MCNC: 1.36 MG/DL (ref 0.6–1)
DIFFERENTIAL METHOD BLD: ABNORMAL
EOSINOPHIL # BLD: 0.7 K/UL (ref 0–0.8)
EOSINOPHIL NFR BLD: 18 % (ref 0.5–7.8)
EPI CELLS #/AREA URNS HPF: ABNORMAL /HPF
ERYTHROCYTE [DISTWIDTH] IN BLOOD BY AUTOMATED COUNT: 12.1 % (ref 11.9–14.6)
GLOBULIN SER CALC-MCNC: 3 G/DL (ref 2.3–3.5)
GLUCOSE SERPL-MCNC: 91 MG/DL (ref 65–100)
GLUCOSE UR STRIP.AUTO-MCNC: NEGATIVE MG/DL
HCT VFR BLD AUTO: 34.3 % (ref 35.8–46.3)
HGB BLD-MCNC: 11.3 G/DL (ref 11.7–15.4)
HGB UR QL STRIP: NEGATIVE
IMM GRANULOCYTES # BLD AUTO: 0 K/UL (ref 0–0.5)
IMM GRANULOCYTES NFR BLD AUTO: 0 % (ref 0–5)
KETONES UR QL STRIP.AUTO: NEGATIVE MG/DL
LEUKOCYTE ESTERASE UR QL STRIP.AUTO: ABNORMAL
LYMPHOCYTES # BLD: 2 K/UL (ref 0.5–4.6)
LYMPHOCYTES NFR BLD: 46 % (ref 13–44)
MCH RBC QN AUTO: 31.9 PG (ref 26.1–32.9)
MCHC RBC AUTO-ENTMCNC: 32.9 G/DL (ref 31.4–35)
MCV RBC AUTO: 96.9 FL (ref 79.6–97.8)
MONOCYTES # BLD: 0.3 K/UL (ref 0.1–1.3)
MONOCYTES NFR BLD: 7 % (ref 4–12)
NEUTS SEG # BLD: 1.2 K/UL (ref 1.7–8.2)
NEUTS SEG NFR BLD: 28 % (ref 43–78)
NITRITE UR QL STRIP.AUTO: NEGATIVE
NRBC # BLD: 0 K/UL (ref 0–0.2)
PH UR STRIP: 6 [PH] (ref 5–9)
PLATELET # BLD AUTO: 239 K/UL (ref 150–450)
PMV BLD AUTO: 9.4 FL (ref 9.4–12.3)
POTASSIUM SERPL-SCNC: 3.7 MMOL/L (ref 3.5–5.1)
PROT SERPL-MCNC: 6.6 G/DL (ref 6.3–8.2)
PROT UR STRIP-MCNC: NEGATIVE MG/DL
RBC # BLD AUTO: 3.54 M/UL (ref 4.05–5.2)
RBC #/AREA URNS HPF: ABNORMAL /HPF
SODIUM SERPL-SCNC: 139 MMOL/L (ref 136–145)
SP GR UR REFRACTOMETRY: 1.01 (ref 1–1.02)
UROBILINOGEN UR QL STRIP.AUTO: 0.2 EU/DL (ref 0.2–1)
WBC # BLD AUTO: 4.2 K/UL (ref 4.3–11.1)
WBC URNS QL MICRO: ABNORMAL /HPF

## 2019-05-14 PROCEDURE — 99284 EMERGENCY DEPT VISIT MOD MDM: CPT | Performed by: EMERGENCY MEDICINE

## 2019-05-14 PROCEDURE — 81003 URINALYSIS AUTO W/O SCOPE: CPT

## 2019-05-14 PROCEDURE — 71250 CT THORAX DX C-: CPT

## 2019-05-14 PROCEDURE — 85025 COMPLETE CBC W/AUTO DIFF WBC: CPT

## 2019-05-14 PROCEDURE — 94640 AIRWAY INHALATION TREATMENT: CPT

## 2019-05-14 PROCEDURE — 74011000250 HC RX REV CODE- 250: Performed by: EMERGENCY MEDICINE

## 2019-05-14 PROCEDURE — 80053 COMPREHEN METABOLIC PANEL: CPT

## 2019-05-14 PROCEDURE — 71046 X-RAY EXAM CHEST 2 VIEWS: CPT

## 2019-05-14 RX ORDER — IPRATROPIUM BROMIDE AND ALBUTEROL SULFATE 2.5; .5 MG/3ML; MG/3ML
3 SOLUTION RESPIRATORY (INHALATION)
Status: COMPLETED | OUTPATIENT
Start: 2019-05-14 | End: 2019-05-14

## 2019-05-14 RX ORDER — LORATADINE 10 MG/1
10 TABLET ORAL DAILY
Qty: 30 TAB | Refills: 0 | Status: SHIPPED | OUTPATIENT
Start: 2019-05-14 | End: 2019-06-21 | Stop reason: SDUPTHER

## 2019-05-14 RX ADMIN — IPRATROPIUM BROMIDE AND ALBUTEROL SULFATE 3 ML: .5; 3 SOLUTION RESPIRATORY (INHALATION) at 19:56

## 2019-05-14 NOTE — ED PROVIDER NOTES
50-year-old female presenting for persistent cough has been going on for about 6 weeks. Benny Perdue followed by her primary care doctor by pulmonologist.  She's been on multiple rounds of antibiotics without resolution. At some point she had pulmonary nodules that were identified on CT was sent for a PET scan PET scan did not show any hypermetabolic lesions so she was recommended for 6 month follow-up. Currently on Bactrim without resolution. She apparently on the way had a sputum culture that grew out MRSA. She states she still having productive cough, episodes of shortness of breath, low-grade fevers, fatigue. Now she is concerned about some swelling in her lower extremities. The history is provided by the patient. Other This is a recurrent problem. The current episode started more than 1 week ago. The problem occurs constantly. The problem has not changed since onset. Associated symptoms include shortness of breath. Pertinent negatives include no chest pain, no abdominal pain and no headaches. The symptoms are aggravated by coughing, stress and exertion. Nothing relieves the symptoms. The treatment provided no relief. Past Medical History:  
Diagnosis Date  Anemia  Arrhythmia Diagnosed 2010, patient believes now resolved. Patient has no cardiologist  
 Arthritis Osteoarthritis diagnosed 2855-3051  Asthma Diagnosed- can't recall.  Carpal tunnel syndrome   
 left  Chronic obstructive pulmonary disease ([address]  Chronic pain   
 diagnosed 1994 severe fibromyalgia and 2001 infarcts pleurisy from PE.  
 DDD (degenerative disc disease), cervical   
 Depression Diagnosed 2002  Dysphagia  Facial abscess  Factor 5 Leiden mutation, heterozygous ([address]  Fibromyalgia  Gait instability  GERD (gastroesophageal reflux disease) Diagnosed 2001.  Hypercholesterolemia Diagnosed 2011.  Hypertension Diagnose 2001  Lung nodule  Multiple falls  Other ill-defined conditions(799.89) IBS< Danney Hugger  PTSD (post-traumatic stress disorder)  Scoliosis  Thromboembolus ([address] Diagnosed  DVT  Thyroid disease   
 hypothryroidism diagnosed approx .  Trauma PTSD diagnosed . Past Surgical History:  
Procedure Laterality Date  COLONOSCOPY N/A 4/15/2019 COLONOSCOPY BMI 30 performed by Jarret York MD at [address] HX GYN    
 2 D&C due to miscarriages. Year  and .  HX HEENT    
 3 lumps removed from neck  HX ORTHOPAEDIC Right   
 transfer tendon  HX ORTHOPAEDIC Right   
 trigger fingers  HX ORTHOPAEDIC Right   
 carpal tunel  HX ORTHOPAEDIC Left 2018  
 carpal tunnel x 2 Family History:  
Problem Relation Age of Onset  Clotting Disorder Maternal Aunt  Cancer Maternal Uncle  Cancer Maternal Grandmother  Clotting Disorder Maternal Grandfather  Heart Disease Maternal Grandfather  Cancer Maternal Grandfather  Clotting Disorder Mother  No Known Problems Father  No Known Problems Brother  No Known Problems Paternal Grandmother  No Known Problems Paternal Grandfather Social History Socioeconomic History  Marital status:  Spouse name: Not on file  Number of children: Not on file  Years of education: Not on file  Highest education level: Not on file Occupational History  Not on file Social Needs  Financial resource strain: Not on file  Food insecurity:  
  Worry: Not on file Inability: Not on file  Transportation needs:  
  Medical: Not on file Non-medical: Not on file Tobacco Use  Smoking status: Former Smoker Packs/day: 1.00 Years: 30.00 Pack years: 30.00 Types: Cigarettes Last attempt to quit: 2016 Years since quittin.6  Smokeless tobacco: Never Used Substance and Sexual Activity  Alcohol use:  No  
 Alcohol/week: 0.0 oz  
  Comment: Once a year.  Drug use: No  
 Sexual activity: Not Currently Lifestyle  Physical activity:  
  Days per week: Not on file Minutes per session: Not on file  Stress: Not on file Relationships  Social connections:  
  Talks on phone: Not on file Gets together: Not on file Attends Islam service: Not on file Active member of club or organization: Not on file Attends meetings of clubs or organizations: Not on file Relationship status: Not on file  Intimate partner violence:  
  Fear of current or ex partner: Not on file Emotionally abused: Not on file Physically abused: Not on file Forced sexual activity: Not on file Other Topics Concern  Not on file Social History Narrative  Not on file ALLERGIES: Ciprofloxacin and Codeine Review of Systems Constitutional: Positive for fatigue and fever. Respiratory: Positive for cough and shortness of breath. Cardiovascular: Negative for chest pain. Gastrointestinal: Negative for abdominal pain. Neurological: Negative for headaches. All other systems reviewed and are negative. Vitals:  
 05/14/19 1452 BP: 98/64 Pulse: 70 Resp: 18 SpO2: 98% Weight: 70.8 kg (156 lb) Height: 5' 2\" (1.575 m) Physical Exam  
Constitutional: She is oriented to person, place, and time. She appears well-developed and well-nourished. HENT:  
Head: Normocephalic and atraumatic. Eyes: Pupils are equal, round, and reactive to light. Conjunctivae are normal.  
Neck: Neck supple. Cardiovascular: Normal rate and regular rhythm. Pulmonary/Chest: Effort normal and breath sounds normal.  
Abdominal: Soft. Bowel sounds are normal.  
Musculoskeletal: Normal range of motion. She exhibits edema. 1+ pitting edema around the ankles in a stocking distribution. Nonblanching macular rash in the same area of the swelling. Neurological: She is alert and oriented to person, place, and time. Skin: Skin is warm and dry. Nursing note and vitals reviewed. MDM Number of Diagnoses or Management Options Chronic bronchitis with acute exacerbation [facility]):  
Diagnosis management comments: 43-year-old female presenting for persistent cough and episodes of shortness of breath is not responded to doxycycline or Z-Brian. She is currently on Bactrim without relief as well. She is also complaining that her lower extremities begun to swell. Get an IV, chest x-ray, basic labs. My suspicion is the lower extremity edema secondary to amlodipine use. The macular rash looks like micro-vascular vessel breakdown because the patient is on blood thinners likely secondary to tense edema is subsided somewhat. Amount and/or Complexity of Data Reviewed Clinical lab tests: ordered and reviewed Tests in the radiology section of CPT®: ordered and reviewed Tests in the medicine section of CPT®: ordered and reviewed Decide to obtain previous medical records or to obtain history from someone other than the patient: yes (I have fielded call from patient. Patient did not receive return call on 5/10/2019. She states she is continues to run a fever and this has been ongoing-she ran temp of 100 today. She has not taken Tylenol today. Coughing alot of green sputum. She had sputum that resulted on 5/3/2019-with moderate Methicillin resistant staph aureus. Patient finished Bactrim on 5/10/2019. This was prescribed by Mrs Milka Singletary on 5/1/2019. Patient reports feeling bad since March. She is wheezing and more SOB than she has been. She reports no improvement at all with multiple antibiotics and steroids. She tells me she is worse. Patient with continual cough during call.  
  
4/19/2019-ZPAK and Prednisone per Dr Brad Acevedo 4/10/2019 Doxy 4/4/2019 Cipro-she only took one pill due to allergy 2/27/2019 Doxy/Prednisone per Mrs Milka Singletary 
 
) Review and summarize past medical records: yes Independent visualization of images, tracings, or specimens: yes (Reviewed the patient's chest x-ray and her CT.) Risk of Complications, Morbidity, and/or Mortality Presenting problems: moderate Diagnostic procedures: high Management options: moderate General comments: At this point the patient has been in the emergency department for multiple hours and has remained completely stable. She has no increased oxygen demand. Her imaging and laboratory values are unremarkable. I would try the patient on a course of Avelox or levofloxacin but she had a reaction to Cipro so this must need to be avoided. She has an outpatient team that is following this he can continue to work with her. I recommended treatments for allergic bronchitis to see if that assists with some other symptoms but at this point I see no indication for admission to the hospital. 
 
I personally reviewed the patient's vital signs, laboratory tests, and/or radiological findings. I discussed these findings with the patient and their significance. I answered all questions and gave the patient clear return precautions. The patient was discharged from the emergency department in stable condition Patient Progress Patient progress: improved Procedures

## 2019-05-14 NOTE — ED TRIAGE NOTES
Pt states she has been on 4 different abx for staph infection in her lungs. Had pet scan to see if nodules in lungs were cancerous but was told it was not. Bilateral lower extremities red and swollen. Was instructed to come to ER by PCP.

## 2019-05-15 NOTE — DISCHARGE INSTRUCTIONS
Continue working with her primary care physician. I see no clear indication for admission to the hospital.  He did not have pneumonia. This appears to be more like a chronic bronchitis exacerbation. I recommend the following regimen.   Zyrtec in the morning  Claritin at night  Flonase nasal spray  Over-the-counter decongestants and mucolytic's

## 2019-05-15 NOTE — ED NOTES
I have reviewed discharge instructions with the patient. The patient verbalized understanding. Patient left ED via Discharge Method: ambulatory to Home with mother Opportunity for questions and clarification provided. Patient given 1 scripts. Medication explained to pt and pt v\u about medication. Pt in no acute distress at discharge. To continue your aftercare when you leave the hospital, you may receive an automated call from our care team to check in on how you are doing. This is a free service and part of our promise to provide the best care and service to meet your aftercare needs.  If you have questions, or wish to unsubscribe from this service please call [phone]. Thank you for Choosing our [facility] Emergency Department.

## 2019-10-25 ENCOUNTER — HOSPITAL ENCOUNTER (OUTPATIENT)
Dept: CT IMAGING | Age: 59
Discharge: HOME | End: 2019-10-25
Attending: NURSE PRACTITIONER
Payer: MEDICAID

## 2019-10-25 DIAGNOSIS — R91.1 PULMONARY NODULE: ICD-10-CM

## 2019-10-25 PROCEDURE — 71250 CT THORAX DX C-: CPT

## 2019-10-28 NOTE — PROGRESS NOTES
Please let patient know that CT reveals emphysema changes, unchanged. Also unchanged are her nodules. Largest is 9 mm in the RLL. Will need follow up CT of chest without contrast in 1 year for the nodules. Please arrange. Thank you.

## 2019-10-29 NOTE — PROGRESS NOTES
Pt's mother was notified of the results. She was told CT reveals emphysema changes, Nodules are unchanged. Largest is 9 mm in the RLL. She was told marissa recommends of Ct in one year. She verbalized understanding. Order placed.

## 2022-01-14 ENCOUNTER — TRANSCRIBE ORDER (OUTPATIENT)
Dept: SCHEDULING | Age: 62
End: 2022-01-14

## 2022-01-14 DIAGNOSIS — Z12.31 ENCOUNTER FOR SCREENING MAMMOGRAM FOR MALIGNANT NEOPLASM OF BREAST: Primary | ICD-10-CM

## 2022-01-29 ENCOUNTER — HOSPITAL ENCOUNTER (EMERGENCY)
Age: 62
Discharge: HOME | End: 2022-01-30
Attending: EMERGENCY MEDICINE
Payer: MEDICAID

## 2022-01-29 ENCOUNTER — APPOINTMENT (OUTPATIENT)
Dept: GENERAL RADIOLOGY | Age: 62
End: 2022-01-29
Attending: EMERGENCY MEDICINE
Payer: MEDICAID

## 2022-01-29 DIAGNOSIS — U07.1 COVID-19 VIRUS INFECTION: Primary | ICD-10-CM

## 2022-01-29 LAB
ALBUMIN SERPL-MCNC: 3.3 G/DL (ref 3.2–4.6)
ALBUMIN/GLOB SERPL: 0.9 {RATIO} (ref 1.2–3.5)
ALP SERPL-CCNC: 64 U/L (ref 50–136)
ALT SERPL-CCNC: 32 U/L (ref 12–65)
ANION GAP SERPL CALC-SCNC: 6 MMOL/L (ref 7–16)
AST SERPL-CCNC: 39 U/L (ref 15–37)
BASOPHILS # BLD: 0 K/UL (ref 0–0.2)
BASOPHILS NFR BLD: 0 % (ref 0–2)
BILIRUB SERPL-MCNC: 0.2 MG/DL (ref 0.2–1.1)
BUN SERPL-MCNC: 11 MG/DL (ref 8–23)
CALCIUM SERPL-MCNC: 8.8 MG/DL (ref 8.3–10.4)
CHLORIDE SERPL-SCNC: 101 MMOL/L (ref 98–107)
CO2 SERPL-SCNC: 29 MMOL/L (ref 21–32)
CREAT SERPL-MCNC: 1.07 MG/DL (ref 0.6–1)
DIFFERENTIAL METHOD BLD: ABNORMAL
EOSINOPHIL # BLD: 0 K/UL (ref 0–0.8)
EOSINOPHIL NFR BLD: 0 % (ref 0.5–7.8)
ERYTHROCYTE [DISTWIDTH] IN BLOOD BY AUTOMATED COUNT: 12 % (ref 11.9–14.6)
GLOBULIN SER CALC-MCNC: 3.8 G/DL (ref 2.3–3.5)
GLUCOSE SERPL-MCNC: 110 MG/DL (ref 65–100)
HCT VFR BLD AUTO: 42.1 % (ref 35.8–46.3)
HGB BLD-MCNC: 14.3 G/DL (ref 11.7–15.4)
IMM GRANULOCYTES # BLD AUTO: 0 K/UL (ref 0–0.5)
IMM GRANULOCYTES NFR BLD AUTO: 0 % (ref 0–5)
LACTATE SERPL-SCNC: 1.1 MMOL/L (ref 0.4–2)
LYMPHOCYTES # BLD: 0.8 K/UL (ref 0.5–4.6)
LYMPHOCYTES NFR BLD: 27 % (ref 13–44)
MAGNESIUM SERPL-MCNC: 2.1 MG/DL (ref 1.8–2.4)
MCH RBC QN AUTO: 32.4 PG (ref 26.1–32.9)
MCHC RBC AUTO-ENTMCNC: 34 G/DL (ref 31.4–35)
MCV RBC AUTO: 95.5 FL (ref 79.6–97.8)
MONOCYTES # BLD: 0.2 K/UL (ref 0.1–1.3)
MONOCYTES NFR BLD: 7 % (ref 4–12)
NEUTS SEG # BLD: 2 K/UL (ref 1.7–8.2)
NEUTS SEG NFR BLD: 65 % (ref 43–78)
NRBC # BLD: 0 K/UL (ref 0–0.2)
PLATELET # BLD AUTO: 222 K/UL (ref 150–450)
PMV BLD AUTO: 8.8 FL (ref 9.4–12.3)
POTASSIUM SERPL-SCNC: 2.8 MMOL/L (ref 3.5–5.1)
PROT SERPL-MCNC: 7.1 G/DL (ref 6.3–8.2)
RBC # BLD AUTO: 4.41 M/UL (ref 4.05–5.2)
SODIUM SERPL-SCNC: 136 MMOL/L (ref 136–145)
WBC # BLD AUTO: 3.1 K/UL (ref 4.3–11.1)

## 2022-01-29 PROCEDURE — 71045 X-RAY EXAM CHEST 1 VIEW: CPT

## 2022-01-29 PROCEDURE — 83605 ASSAY OF LACTIC ACID: CPT

## 2022-01-29 PROCEDURE — 74011250637 HC RX REV CODE- 250/637: Performed by: EMERGENCY MEDICINE

## 2022-01-29 PROCEDURE — 85025 COMPLETE CBC W/AUTO DIFF WBC: CPT

## 2022-01-29 PROCEDURE — 99285 EMERGENCY DEPT VISIT HI MDM: CPT

## 2022-01-29 PROCEDURE — 96374 THER/PROPH/DIAG INJ IV PUSH: CPT

## 2022-01-29 PROCEDURE — 80053 COMPREHEN METABOLIC PANEL: CPT

## 2022-01-29 PROCEDURE — 93005 ELECTROCARDIOGRAM TRACING: CPT | Performed by: EMERGENCY MEDICINE

## 2022-01-29 PROCEDURE — 74011250636 HC RX REV CODE- 250/636: Performed by: EMERGENCY MEDICINE

## 2022-01-29 PROCEDURE — 81003 URINALYSIS AUTO W/O SCOPE: CPT

## 2022-01-29 PROCEDURE — 81001 URINALYSIS AUTO W/SCOPE: CPT

## 2022-01-29 PROCEDURE — 83735 ASSAY OF MAGNESIUM: CPT

## 2022-01-29 PROCEDURE — 96375 TX/PRO/DX INJ NEW DRUG ADDON: CPT

## 2022-01-29 RX ORDER — ACETAMINOPHEN 500 MG
1000 TABLET ORAL
Status: COMPLETED | OUTPATIENT
Start: 2022-01-29 | End: 2022-01-29

## 2022-01-29 RX ORDER — HYDROMORPHONE HYDROCHLORIDE 1 MG/ML
1 INJECTION, SOLUTION INTRAMUSCULAR; INTRAVENOUS; SUBCUTANEOUS ONCE
Status: COMPLETED | OUTPATIENT
Start: 2022-01-29 | End: 2022-01-29

## 2022-01-29 RX ORDER — SODIUM CHLORIDE 0.9 % (FLUSH) 0.9 %
5-10 SYRINGE (ML) INJECTION EVERY 8 HOURS
Status: DISCONTINUED | OUTPATIENT
Start: 2022-01-29 | End: 2022-01-30 | Stop reason: HOSPADM

## 2022-01-29 RX ORDER — SODIUM CHLORIDE 0.9 % (FLUSH) 0.9 %
5-10 SYRINGE (ML) INJECTION AS NEEDED
Status: DISCONTINUED | OUTPATIENT
Start: 2022-01-29 | End: 2022-01-30 | Stop reason: HOSPADM

## 2022-01-29 RX ORDER — ONDANSETRON 2 MG/ML
4 INJECTION INTRAMUSCULAR; INTRAVENOUS
Status: COMPLETED | OUTPATIENT
Start: 2022-01-29 | End: 2022-01-29

## 2022-01-29 RX ADMIN — ACETAMINOPHEN 1000 MG: 500 TABLET ORAL at 20:45

## 2022-01-29 RX ADMIN — SODIUM CHLORIDE 1000 ML: 900 INJECTION, SOLUTION INTRAVENOUS at 20:45

## 2022-01-29 RX ADMIN — ONDANSETRON 4 MG: 2 INJECTION INTRAMUSCULAR; INTRAVENOUS at 20:45

## 2022-01-29 RX ADMIN — HYDROMORPHONE HYDROCHLORIDE 1 MG: 1 INJECTION, SOLUTION INTRAMUSCULAR; INTRAVENOUS; SUBCUTANEOUS at 23:49

## 2022-01-30 VITALS
SYSTOLIC BLOOD PRESSURE: 113 MMHG | WEIGHT: 149 LBS | DIASTOLIC BLOOD PRESSURE: 90 MMHG | TEMPERATURE: 100.5 F | HEART RATE: 98 BPM | OXYGEN SATURATION: 97 % | BODY MASS INDEX: 27.42 KG/M2 | RESPIRATION RATE: 21 BRPM | HEIGHT: 62 IN

## 2022-01-30 LAB
APPEARANCE UR: CLEAR
ATRIAL RATE: 104 BPM
BACTERIA URNS QL MICRO: 0 /HPF
BILIRUB UR QL: NEGATIVE
CALCULATED P AXIS, ECG09: 78 DEGREES
CALCULATED R AXIS, ECG10: 80 DEGREES
CALCULATED T AXIS, ECG11: 55 DEGREES
CASTS URNS QL MICRO: ABNORMAL /LPF
COLOR UR: YELLOW
DIAGNOSIS, 93000: NORMAL
EPI CELLS #/AREA URNS HPF: ABNORMAL /HPF
GLUCOSE UR STRIP.AUTO-MCNC: NEGATIVE MG/DL
HGB UR QL STRIP: NEGATIVE
KETONES UR QL STRIP.AUTO: ABNORMAL MG/DL
LEUKOCYTE ESTERASE UR QL STRIP.AUTO: ABNORMAL
NITRITE UR QL STRIP.AUTO: NEGATIVE
P-R INTERVAL, ECG05: 114 MS
PH UR STRIP: 7.5 [PH] (ref 5–9)
PROT UR STRIP-MCNC: NEGATIVE MG/DL
Q-T INTERVAL, ECG07: 339 MS
QRS DURATION, ECG06: 91 MS
QTC CALCULATION (BEZET), ECG08: 446 MS
RBC #/AREA URNS HPF: ABNORMAL /HPF
SP GR UR REFRACTOMETRY: 1.02 (ref 1–1.02)
UROBILINOGEN UR QL STRIP.AUTO: 1 EU/DL (ref 0.2–1)
VENTRICULAR RATE, ECG03: 104 BPM
WBC URNS QL MICRO: ABNORMAL /HPF

## 2022-01-30 NOTE — DISCHARGE INSTRUCTIONS
Monitor your oxygen saturations at home  Follow-up with your primary care physician  Return to the ER for any new, worsening or life-threatening symptoms

## 2022-01-30 NOTE — ED PROVIDER NOTES
Patient reports remote diagnosis of COVID-19. States in the past the patient had fevers, body aches. As well as nausea vomiting. Reports hurting all over. Reports she does take chronic pain medication but was afraid to take the days she was concerned she does not vomit. Denies any hematemesis or melena. The history is provided by the patient. Vomiting   This is a new problem. The current episode started yesterday. There has been a fever of 101 - 101.9 F. The fever has been present for 1 - 2 days. Associated symptoms include chills, a fever and myalgias. Pertinent negatives include no abdominal pain, no headaches, no cough, no URI and no headaches. Past Medical History:   Diagnosis Date    Anemia     Arrhythmia     Diagnosed 2010, patient believes now resolved. Patient has no cardiologist    Arthritis     Osteoarthritis diagnosed [id number]    Asthma     Diagnosed- can't recall.  Carpal tunnel syndrome     left    Chronic obstructive pulmonary disease (HCC)     Chronic pain     diagnosed 1994 severe fibromyalgia and 2001 infarcts pleurisy from PE.    DDD (degenerative disc disease), cervical     Depression     Diagnosed 2002     Dysphagia     Facial abscess     Factor 5 Leiden mutation, heterozygous ([address]     Fibromyalgia     Gait instability     GERD (gastroesophageal reflux disease)     Diagnosed 2001.  Hypercholesterolemia     Diagnosed 2011.  Hypertension     Diagnose 2001     Lung nodule     Multiple falls     Other ill-defined conditions(799.89)     IBS< Fibra    PTSD (post-traumatic stress disorder)     Scoliosis     Thromboembolus ([address]     Diagnosed 2001 DVT    Thyroid disease     hypothryroidism diagnosed approx 2013.  Trauma     PTSD diagnosed 1999. Past Surgical History:   Procedure Laterality Date    COLONOSCOPY N/A 4/15/2019    COLONOSCOPY BMI 30 performed by Yazan Guevara [facility] at [facility] ENDOSCOPY    HX GYN      2 D&C due to miscarriages.   Year 7260 and .  HX HEENT      3 lumps removed from neck    HX ORTHOPAEDIC Right 2013    transfer tendon    HX ORTHOPAEDIC Right     trigger fingers    HX ORTHOPAEDIC Right     carpal tunel    HX ORTHOPAEDIC Left 2018    carpal tunnel x 2         Family History:   Problem Relation Age of Onset    Clotting Disorder Maternal Aunt     Cancer Maternal Uncle     Cancer Maternal Grandmother     Clotting Disorder Maternal Grandfather     Heart Disease Maternal Grandfather     Cancer Maternal Grandfather     Clotting Disorder Mother     No Known Problems Father     No Known Problems Brother     No Known Problems Paternal Grandmother     No Known Problems Paternal Grandfather        Social History     Socioeconomic History    Marital status:      Spouse name: Not on file    Number of children: Not on file    Years of education: Not on file    Highest education level: Not on file   Occupational History    Not on file   Tobacco Use    Smoking status: Former Smoker     Packs/day: 1.00     Years: 30.00     Pack years: 30.00     Types: Cigarettes     Quit date: 2016     Years since quittin.4    Smokeless tobacco: Never Used   Vaping Use    Vaping Use: Never used   Substance and Sexual Activity    Alcohol use: No     Alcohol/week: 0.0 standard drinks     Comment: Once a year.  Drug use: No    Sexual activity: Not Currently   Other Topics Concern    Not on file   Social History Narrative    Not on file     Social Determinants of Health     Financial Resource Strain:     Difficulty of Paying Living Expenses: Not on file   Food Insecurity:     Worried About Running Out of Food in the Last Year: Not on file    Aarti of Food in the Last Year: Not on file   Transportation Needs:     Lack of Transportation (Medical): Not on file    Lack of Transportation (Non-Medical):  Not on file   Physical Activity:     Days of Exercise per Week: Not on file    Minutes of Exercise per Session: Not on file   Stress:     Feeling of Stress : Not on file   Social Connections:     Frequency of Communication with Friends and Family: Not on file    Frequency of Social Gatherings with Friends and Family: Not on file    Attends Pentecostalism Services: Not on file    Active Member of Clubs or Organizations: Not on file    Attends Club or Organization Meetings: Not on file    Marital Status: Not on file   Intimate Partner Violence:     Fear of Current or Ex-Partner: Not on file    Emotionally Abused: Not on file    Physically Abused: Not on file    Sexually Abused: Not on file   Housing Stability:     Unable to Pay for Housing in the Last Year: Not on file    Number of [place] in the Last Year: Not on file    Unstable Housing in the Last Year: Not on file         ALLERGIES: Ciprofloxacin and Codeine    Review of Systems   Constitutional: Positive for chills and fever. HENT: Negative for congestion, dental problem, trouble swallowing and voice change. Eyes: Negative for photophobia and visual disturbance. Respiratory: Negative for cough, chest tightness, shortness of breath and stridor. Cardiovascular: Negative for chest pain, palpitations and leg swelling. Gastrointestinal: Positive for vomiting. Negative for abdominal pain, anal bleeding and nausea. Genitourinary: Negative for pelvic pain and vaginal pain. Musculoskeletal: Positive for back pain and myalgias. Negative for gait problem. Skin: Negative for color change and pallor. Neurological: Negative for tremors, weakness and headaches. Hematological: Negative for adenopathy. Does not bruise/bleed easily. Psychiatric/Behavioral: Negative for behavioral problems and dysphoric mood. All other systems reviewed and are negative.       Vitals:    01/29/22 1916   BP: 120/69   Pulse: (!) 106   Resp: 20   Temp: (!) 100.5 °F (38.1 °C)   SpO2: 94%   Weight: 67.6 kg (149 lb)   Height: 5' 2\" (1.575 m)            Physical Exam  Vitals and nursing note reviewed. Constitutional:       General: She is not in acute distress. Appearance: Normal appearance. She is not ill-appearing. HENT:      Head: Normocephalic and atraumatic. Right Ear: External ear normal.      Left Ear: External ear normal.      Nose: Nose normal. No congestion or rhinorrhea. Mouth/Throat:      Mouth: Mucous membranes are moist.      Pharynx: No oropharyngeal exudate or posterior oropharyngeal erythema. Eyes:      General:         Right eye: No discharge. Left eye: No discharge. Extraocular Movements: Extraocular movements intact. Pupils: Pupils are equal, round, and reactive to light. Cardiovascular:      Rate and Rhythm: Regular rhythm. Tachycardia present. Pulses: Normal pulses. Heart sounds: Normal heart sounds. Pulmonary:      Effort: Pulmonary effort is normal. No respiratory distress. Breath sounds: Normal breath sounds. No stridor. No rhonchi. Abdominal:      General: Abdomen is flat. There is no distension. Palpations: Abdomen is soft. There is no mass. Tenderness: There is no abdominal tenderness. Hernia: No hernia is present. Musculoskeletal:         General: No swelling, tenderness or signs of injury. Normal range of motion. Cervical back: Normal range of motion and neck supple. No rigidity or tenderness. Skin:     Coloration: Skin is not jaundiced. Neurological:      General: No focal deficit present. Mental Status: She is alert and oriented to person, place, and time. Cranial Nerves: No cranial nerve deficit. Sensory: No sensory deficit. Psychiatric:         Mood and Affect: Mood normal.         Behavior: Behavior normal.          MDM  Number of Diagnoses or Management Options  Diagnosis management comments: Febrile here. Likely related to Covid. Plan for screening labs chest x-ray.   She is not hypoxic here currently    10:31 PM  Chest x-ray shows developing bilateral infiltrates. Labs fairly stable here. Awaiting urinalysis. 12:23 AM  Urinalysis without any signs of any infection. Oxygen saturations remained stable and patient is low oxygen. Results of testing discussed in detail with patient. No indication for hospitalization at this time.        Amount and/or Complexity of Data Reviewed  Clinical lab tests: reviewed and ordered  Tests in the radiology section of CPT®: ordered and reviewed  Review and summarize past medical records: yes  Independent visualization of images, tracings, or specimens: yes (EKG interpretation: Sinus tachycardia, rate 104, normal axis, no blocks, no acute ST segment changes none)    Risk of Complications, Morbidity, and/or Mortality  Presenting problems: moderate  Diagnostic procedures: moderate  Management options: high  General comments: High risk due to use of parenteral narcotic medication           Procedures          Results Include:    Recent Results (from the past 24 hour(s))   EKG, 12 LEAD, INITIAL    Collection Time: 01/29/22  7:20 PM   Result Value Ref Range    Ventricular Rate 104 BPM    Atrial Rate 104 BPM    P-R Interval 114 ms    QRS Duration 91 ms    Q-T Interval 339 ms    QTC Calculation (Bezet) 446 ms    Calculated P Axis 78 degrees    Calculated R Axis 80 degrees    Calculated T Axis 55 degrees    Diagnosis       Sinus tachycardia  Minimal ST depression, inferior leads  Baseline wander in lead(s) V2     CBC WITH AUTOMATED DIFF    Collection Time: 01/29/22  7:24 PM   Result Value Ref Range    WBC 3.1 (L) 4.3 - 11.1 K/uL    RBC 4.41 4.05 - 5.2 M/uL    HGB 14.3 11.7 - 15.4 g/dL    HCT 42.1 35.8 - 46.3 %    MCV 95.5 79.6 - 97.8 FL    MCH 32.4 26.1 - 32.9 PG    MCHC 34.0 31.4 - 35.0 g/dL    RDW 12.0 11.9 - 14.6 %    PLATELET 772 641 - 063 K/uL    MPV 8.8 (L) 9.4 - 12.3 FL    ABSOLUTE NRBC 0.00 0.0 - 0.2 K/uL    DF AUTOMATED      NEUTROPHILS 65 43 - 78 %    LYMPHOCYTES 27 13 - 44 %    MONOCYTES 7 4.0 - 12.0 %    EOSINOPHILS 0 (L) 0.5 - 7.8 %    BASOPHILS 0 0.0 - 2.0 %    IMMATURE GRANULOCYTES 0 0.0 - 5.0 %    ABS. NEUTROPHILS 2.0 1.7 - 8.2 K/UL    ABS. LYMPHOCYTES 0.8 0.5 - 4.6 K/UL    ABS. MONOCYTES 0.2 0.1 - 1.3 K/UL    ABS. EOSINOPHILS 0.0 0.0 - 0.8 K/UL    ABS. BASOPHILS 0.0 0.0 - 0.2 K/UL    ABS. IMM. GRANS. 0.0 0.0 - 0.5 K/UL   METABOLIC PANEL, COMPREHENSIVE    Collection Time: 01/29/22  7:24 PM   Result Value Ref Range    Sodium 136 136 - 145 mmol/L    Potassium 2.8 (L) 3.5 - 5.1 mmol/L    Chloride 101 98 - 107 mmol/L    CO2 29 21 - 32 mmol/L    Anion gap 6 (L) 7 - 16 mmol/L    Glucose 110 (H) 65 - 100 mg/dL    BUN 11 8 - 23 MG/DL    Creatinine 1.07 (H) 0.6 - 1.0 MG/DL    GFR est AA >60 >60 ml/min/1.73m2    GFR est non-AA 56 (L) >60 ml/min/1.73m2    Calcium 8.8 8.3 - 10.4 MG/DL    Bilirubin, total 0.2 0.2 - 1.1 MG/DL    ALT (SGPT) 32 12 - 65 U/L    AST (SGOT) 39 (H) 15 - 37 U/L    Alk. phosphatase 64 50 - 136 U/L    Protein, total 7.1 6.3 - 8.2 g/dL    Albumin 3.3 3.2 - 4.6 g/dL    Globulin 3.8 (H) 2.3 - 3.5 g/dL    A-G Ratio 0.9 (L) 1.2 - 3.5     MAGNESIUM    Collection Time: 01/29/22  7:24 PM   Result Value Ref Range    Magnesium 2.1 1.8 - 2.4 mg/dL   LACTIC ACID    Collection Time: 01/29/22  7:24 PM   Result Value Ref Range    Lactic acid 1.1 0.4 - 2.0 MMOL/L   URINALYSIS W/ RFLX MICROSCOPIC    Collection Time: 01/29/22 11:37 PM   Result Value Ref Range    Color YELLOW      Appearance CLEAR      Specific gravity 1.017 1.001 - 1.023      pH (UA) 7.5 5.0 - 9.0      Protein Negative NEG mg/dL    Glucose Negative mg/dL    Ketone TRACE (A) NEG mg/dL    Bilirubin Negative NEG      Blood Negative NEG      Urobilinogen 1.0 0.2 - 1.0 EU/dL    Nitrites Negative NEG      Leukocyte Esterase SMALL (A) NEG      WBC 3-5 0 /hpf    RBC 0-3 0 /hpf    Epithelial cells 3-5 0 /hpf    Bacteria 0 0 /hpf    Casts 0-3 0 /lpf     Voice dictation software was used during the making of this note.   This software is not perfect and grammatical and other typographical errors may be present. This note has been proofread, but may still contain errors. Evelia Maldonado MD; 1/30/2022 @12:24 AM   ===================================================================    I wore appropriate PPE throughout this patient's ED visit.  Evelia Maldonado MD, 12:24 AM

## 2022-01-30 NOTE — ED NOTES
I have reviewed discharge instructions with the patient. The patient verbalized understanding. Patient left ED via Discharge Method: ambulatory to Home. Patient given 0 scripts. To continue your aftercare when you leave the hospital, you may receive an automated call from our care team to check in on how you are doing. This is a free service and part of our promise to provide the best care and service to meet your aftercare needs.  If you have questions, or wish to unsubscribe from this service please call [phone]. Thank you for Choosing our [facility] Emergency Department.

## 2022-01-30 NOTE — ED TRIAGE NOTES
Pt arrived via [facility] c/o shob, vomiting and dizziness X2 days. Pt states that she was dx with COVID 3 days ago. Reports that she has not been able to eat or drink or take meds in 1 day. Pt also reports left sided flank pain that radiates to her hip.  Denies falls or injury

## 2022-02-08 ENCOUNTER — HOSPITAL ENCOUNTER (OUTPATIENT)
Dept: MAMMOGRAPHY | Age: 62
Discharge: HOME | End: 2022-02-08
Attending: STUDENT IN AN ORGANIZED HEALTH CARE EDUCATION/TRAINING PROGRAM

## 2022-02-08 DIAGNOSIS — Z12.31 ENCOUNTER FOR SCREENING MAMMOGRAM FOR MALIGNANT NEOPLASM OF BREAST: ICD-10-CM

## 2022-03-18 PROBLEM — Z87.891 FORMER SMOKER: Status: ACTIVE | Noted: 2017-04-04

## 2022-03-19 PROBLEM — R68.2 DRY MOUTH: Status: ACTIVE | Noted: 2018-04-16

## 2022-03-19 PROBLEM — B37.0 THRUSH: Status: ACTIVE | Noted: 2018-04-16

## 2022-03-19 PROBLEM — K58.1 IRRITABLE BOWEL SYNDROME WITH CONSTIPATION: Status: ACTIVE | Noted: 2019-04-19

## 2022-03-19 PROBLEM — M26.629 TMJ SYNDROME: Status: ACTIVE | Noted: 2018-10-16

## 2022-03-19 PROBLEM — F41.9 ANXIETY: Status: ACTIVE | Noted: 2018-10-16

## 2022-03-19 PROBLEM — I10 BENIGN ESSENTIAL HTN: Status: ACTIVE | Noted: 2018-04-16

## 2022-03-19 PROBLEM — Z87.81 HISTORY OF FRACTURE OF WRIST: Status: ACTIVE | Noted: 2017-04-04

## 2022-03-19 PROBLEM — H04.123 DRY EYES: Status: ACTIVE | Noted: 2018-04-16

## 2022-03-20 PROBLEM — K59.09 CHRONIC CONSTIPATION: Status: ACTIVE | Noted: 2017-12-26

## 2022-03-20 PROBLEM — R05.9 COUGH: Status: ACTIVE | Noted: 2017-06-09

## 2022-04-06 ENCOUNTER — HOSPITAL ENCOUNTER (OUTPATIENT)
Dept: CT IMAGING | Age: 62
Discharge: HOME | End: 2022-04-06
Attending: INTERNAL MEDICINE
Payer: MEDICAID

## 2022-04-06 DIAGNOSIS — R91.1 PULMONARY NODULE: ICD-10-CM

## 2022-04-06 PROCEDURE — 71250 CT THORAX DX C-: CPT

## 2022-05-27 ENCOUNTER — NURSE ONLY (OUTPATIENT)
Dept: FAMILY MEDICINE CLINIC | Facility: CLINIC | Age: 62
End: 2022-05-27

## 2022-05-27 ENCOUNTER — TELEPHONE (OUTPATIENT)
Dept: FAMILY MEDICINE CLINIC | Facility: CLINIC | Age: 62
End: 2022-05-27

## 2022-05-27 DIAGNOSIS — R73.03 PREDIABETES: ICD-10-CM

## 2022-05-27 DIAGNOSIS — L65.9 ALOPECIA: ICD-10-CM

## 2022-05-27 DIAGNOSIS — E53.8 VITAMIN B12 DEFICIENCY: ICD-10-CM

## 2022-05-27 DIAGNOSIS — E03.9 ACQUIRED HYPOTHYROIDISM: ICD-10-CM

## 2022-05-27 DIAGNOSIS — E78.2 MIXED HYPERLIPIDEMIA: ICD-10-CM

## 2022-05-27 DIAGNOSIS — E03.9 ACQUIRED HYPOTHYROIDISM: Primary | ICD-10-CM

## 2022-05-27 LAB
ALBUMIN SERPL-MCNC: 3.6 G/DL (ref 3.2–4.6)
ALBUMIN/GLOB SERPL: 1.2 {RATIO} (ref 1.2–3.5)
ALP SERPL-CCNC: 77 U/L (ref 50–136)
ALT SERPL-CCNC: 23 U/L (ref 12–65)
ANION GAP SERPL CALC-SCNC: 5 MMOL/L (ref 7–16)
AST SERPL-CCNC: 16 U/L (ref 15–37)
BASOPHILS # BLD: 0 K/UL (ref 0–0.2)
BASOPHILS NFR BLD: 0 % (ref 0–2)
BILIRUB SERPL-MCNC: 0.4 MG/DL (ref 0.2–1.1)
BUN SERPL-MCNC: 11 MG/DL (ref 8–23)
CALCIUM SERPL-MCNC: 9.1 MG/DL (ref 8.3–10.4)
CHLORIDE SERPL-SCNC: 101 MMOL/L (ref 98–107)
CHOLEST SERPL-MCNC: 189 MG/DL
CO2 SERPL-SCNC: 30 MMOL/L (ref 21–32)
CREAT SERPL-MCNC: 1 MG/DL (ref 0.6–1)
DIFFERENTIAL METHOD BLD: ABNORMAL
EOSINOPHIL # BLD: 0.2 K/UL (ref 0–0.8)
EOSINOPHIL NFR BLD: 4 % (ref 0.5–7.8)
ERYTHROCYTE [DISTWIDTH] IN BLOOD BY AUTOMATED COUNT: 12 % (ref 11.9–14.6)
EST. AVERAGE GLUCOSE BLD GHB EST-MCNC: 117 MG/DL
FERRITIN SERPL-MCNC: 20 NG/ML (ref 8–388)
FOLATE SERPL-MCNC: 34.9 NG/ML (ref 3.1–17.5)
GLOBULIN SER CALC-MCNC: 3.1 G/DL (ref 2.3–3.5)
GLUCOSE SERPL-MCNC: 99 MG/DL (ref 65–100)
HBA1C MFR BLD: 5.7 % (ref 4.2–6.3)
HCT VFR BLD AUTO: 38.4 % (ref 35.8–46.3)
HDLC SERPL-MCNC: 104 MG/DL (ref 40–60)
HDLC SERPL: 1.8 {RATIO}
HGB BLD-MCNC: 12.7 G/DL (ref 11.7–15.4)
IMM GRANULOCYTES # BLD AUTO: 0 K/UL (ref 0–0.5)
IMM GRANULOCYTES NFR BLD AUTO: 0 % (ref 0–5)
IRON SERPL-MCNC: 70 UG/DL (ref 35–150)
LDLC SERPL CALC-MCNC: 73 MG/DL
LYMPHOCYTES # BLD: 2.7 K/UL (ref 0.5–4.6)
LYMPHOCYTES NFR BLD: 58 % (ref 13–44)
MCH RBC QN AUTO: 31.9 PG (ref 26.1–32.9)
MCHC RBC AUTO-ENTMCNC: 33.1 G/DL (ref 31.4–35)
MCV RBC AUTO: 96.5 FL (ref 79.6–97.8)
MONOCYTES # BLD: 0.3 K/UL (ref 0.1–1.3)
MONOCYTES NFR BLD: 5 % (ref 4–12)
NEUTS SEG # BLD: 1.5 K/UL (ref 1.7–8.2)
NEUTS SEG NFR BLD: 33 % (ref 43–78)
NRBC # BLD: 0 K/UL (ref 0–0.2)
PLATELET # BLD AUTO: 264 K/UL (ref 150–450)
PMV BLD AUTO: 9.4 FL (ref 9.4–12.3)
POTASSIUM SERPL-SCNC: 3.4 MMOL/L (ref 3.5–5.1)
PROT SERPL-MCNC: 6.7 G/DL (ref 6.3–8.2)
RBC # BLD AUTO: 3.98 M/UL (ref 4.05–5.2)
SODIUM SERPL-SCNC: 136 MMOL/L (ref 136–145)
TRIGL SERPL-MCNC: 60 MG/DL (ref 35–150)
TSH, 3RD GENERATION: 1.6 UIU/ML (ref 0.36–3.74)
VIT B12 SERPL-MCNC: 717 PG/ML (ref 193–986)
VLDLC SERPL CALC-MCNC: 12 MG/DL (ref 6–23)
WBC # BLD AUTO: 4.7 K/UL (ref 4.3–11.1)

## 2022-05-27 RX ORDER — POLYETHYLENE GLYCOL 3350 17 G/17G
17 POWDER, FOR SOLUTION ORAL DAILY PRN
Qty: 510 G | Refills: 2 | Status: SHIPPED | OUTPATIENT
Start: 2022-05-27 | End: 2022-06-06 | Stop reason: SDUPTHER

## 2022-05-27 RX ORDER — CHLORAL HYDRATE 500 MG
2000 CAPSULE ORAL 2 TIMES DAILY
Qty: 180 CAPSULE | Refills: 1 | Status: SHIPPED | OUTPATIENT
Start: 2022-05-27 | End: 2022-07-01 | Stop reason: SDUPTHER

## 2022-05-27 NOTE — TELEPHONE ENCOUNTER
Ins denied the Plenti. Will send in rx for Miralax and new rx for Fish oil . It should not need a PA.

## 2022-05-27 NOTE — TELEPHONE ENCOUNTER
Patient came in for lab work and asked about the prescription for [employer] kit? She said it was not sent to the pharmacy. She also needs refill for Miralax and needs a PA on fish oil? Please advise.

## 2022-06-06 RX ORDER — POLYETHYLENE GLYCOL 3350 17 G/17G
17 POWDER, FOR SOLUTION ORAL DAILY PRN
Qty: 510 G | Refills: 2 | Status: SHIPPED | OUTPATIENT
Start: 2022-06-06

## 2022-06-07 ENCOUNTER — TELEPHONE (OUTPATIENT)
Dept: FAMILY MEDICINE CLINIC | Facility: CLINIC | Age: 62
End: 2022-06-07

## 2022-06-07 NOTE — TELEPHONE ENCOUNTER
Rah Meek (Beckham: [id number]) - [phone]  Omega-3-acid Ethyl Esters 1GM capsules       Status: Sent to Plan    Created: June 3rd, 2022 [id number]    Sent: June 7th, 2022

## 2022-06-09 ENCOUNTER — TELEPHONE (OUTPATIENT)
Dept: FAMILY MEDICINE CLINIC | Facility: CLINIC | Age: 62
End: 2022-06-09

## 2022-06-09 RX ORDER — FLUTICASONE PROPIONATE 50 MCG
SPRAY, SUSPENSION (ML) NASAL
Qty: 1 EACH | Refills: 2 | Status: SHIPPED | OUTPATIENT
Start: 2022-06-09 | End: 2022-08-05 | Stop reason: SDUPTHER

## 2022-06-27 ENCOUNTER — TELEPHONE (OUTPATIENT)
Dept: SLEEP MEDICINE | Age: 62
End: 2022-06-27

## 2022-06-27 RX ORDER — GABAPENTIN 300 MG/1
300 CAPSULE ORAL 3 TIMES DAILY
Qty: 90 CAPSULE | Refills: 5 | Status: SHIPPED | OUTPATIENT
Start: 2022-06-27 | End: 2022-07-27

## 2022-07-01 ENCOUNTER — TELEPHONE (OUTPATIENT)
Dept: SLEEP MEDICINE | Age: 62
End: 2022-07-01

## 2022-07-01 RX ORDER — CHLORAL HYDRATE 500 MG
2000 CAPSULE ORAL 2 TIMES DAILY
Qty: 180 CAPSULE | Refills: 1 | Status: SHIPPED | OUTPATIENT
Start: 2022-07-01 | End: 2022-09-13 | Stop reason: SDUPTHER

## 2022-07-01 RX ORDER — BUPROPION HYDROCHLORIDE 300 MG/1
300 TABLET ORAL EVERY MORNING
Qty: 90 TABLET | Refills: 1 | Status: SHIPPED | OUTPATIENT
Start: 2022-07-01

## 2022-07-05 NOTE — TELEPHONE ENCOUNTER
Left a voice message for pt that Benadryl can be purchased OTC. Pt can call back if she needs other Pulmonary meds escribed.  CHANCE DAWSON MA

## 2022-07-06 ENCOUNTER — TELEPHONE (OUTPATIENT)
Dept: FAMILY MEDICINE CLINIC | Facility: CLINIC | Age: 62
End: 2022-07-06

## 2022-07-06 NOTE — TELEPHONE ENCOUNTER
----- Message from Bean Quintero sent at 7/6/2022  4:28 PM EDT -----  Subject: Medication Problem    Medication: Omega-3 Fatty Acids (FISH OIL) 1000 MG CAPS  Dosage: two twice a day  Ordering Provider: Dr. Nicole Cuevas    Question/Problem: Patient stated that she cannot afford this medication   and needs a prior authorization.     Pharmacy: [id number] - [address] [facility], [address] [place] [address] Anuj [phone]    ---------------------------------------------------------------------------  --------------  Rocio Siddiqi INFO  [phone]; OK to leave message on voicemail  ---------------------------------------------------------------------------  --------------    SCRIPT ANSWERS  Relationship to Patient: Self

## 2022-07-06 NOTE — TELEPHONE ENCOUNTER
----- Message from Cindy Mcfadden sent at 7/6/2022  4:26 PM EDT -----  Subject: Refill Request    QUESTIONS  Name of Medication? baclofen (LIORESAL) 10 MG tablet  Patient-reported dosage and instructions? 3 times a day  How many days do you have left? 10  Preferred Pharmacy? [facility]  Pharmacy phone number (if available)? [phone]  ---------------------------------------------------------------------------  --------------,  Name of Medication? Other - b-12  Patient-reported dosage and instructions? daily  How many days do you have left? 0  Preferred Pharmacy? [facility]  Pharmacy phone number (if available)? [phone]  ---------------------------------------------------------------------------  --------------  Karina Fortis INFO  What is the best way for the office to contact you? OK to leave message on   voicemail  Preferred Call Back Phone Number? [phone]  ---------------------------------------------------------------------------  --------------  SCRIPT ANSWERS  Relationship to Patient?  Self

## 2022-07-07 RX ORDER — BACLOFEN 10 MG/1
10 TABLET ORAL 3 TIMES DAILY
Qty: 90 TABLET | Refills: 5 | Status: SHIPPED | OUTPATIENT
Start: 2022-07-07

## 2022-07-15 RX ORDER — BENZONATATE 200 MG/1
200 CAPSULE ORAL 3 TIMES DAILY PRN
Qty: 90 CAPSULE | Refills: 11 | Status: SHIPPED | OUTPATIENT
Start: 2022-07-15

## 2022-07-15 NOTE — TELEPHONE ENCOUNTER
Dr. Reyna Jackson, would it be alright to send this medication?  If so please send to selected pharmacy attached to the prescription.  // Khushbu Lantigua

## 2022-07-25 RX ORDER — OMEGA-3-ACID ETHYL ESTERS 1 G/1
CAPSULE, LIQUID FILLED ORAL
Qty: 120 CAPSULE | Refills: 0 | Status: SHIPPED | OUTPATIENT
Start: 2022-07-25 | End: 2022-07-26

## 2022-07-26 RX ORDER — OMEGA-3-ACID ETHYL ESTERS 1 G/1
CAPSULE, LIQUID FILLED ORAL
Qty: 120 CAPSULE | Refills: 0 | Status: SHIPPED | OUTPATIENT
Start: 2022-07-26 | End: 2022-08-05 | Stop reason: SDUPTHER

## 2022-08-02 ENCOUNTER — HOSPITAL ENCOUNTER (OUTPATIENT)
Dept: MAMMOGRAPHY | Age: 62
Discharge: HOME | End: 2022-08-05

## 2022-08-02 DIAGNOSIS — Z12.31 VISIT FOR SCREENING MAMMOGRAM: ICD-10-CM

## 2022-08-05 RX ORDER — ATORVASTATIN CALCIUM 20 MG/1
20 TABLET, FILM COATED ORAL DAILY
Qty: 30 TABLET | Refills: 5 | Status: SHIPPED | OUTPATIENT
Start: 2022-08-05

## 2022-08-05 RX ORDER — OMEGA-3-ACID ETHYL ESTERS 1 G/1
CAPSULE, LIQUID FILLED ORAL
Qty: 120 CAPSULE | Refills: 5 | Status: SHIPPED | OUTPATIENT
Start: 2022-08-05 | End: 2022-10-04 | Stop reason: SDUPTHER

## 2022-08-05 RX ORDER — FLUTICASONE PROPIONATE 50 MCG
SPRAY, SUSPENSION (ML) NASAL
Qty: 1 EACH | Refills: 5 | Status: SHIPPED | OUTPATIENT
Start: 2022-08-05

## 2022-08-05 RX ORDER — AMLODIPINE BESYLATE 5 MG/1
5 TABLET ORAL DAILY
Qty: 30 TABLET | Refills: 5 | Status: SHIPPED | OUTPATIENT
Start: 2022-08-05

## 2022-08-05 RX ORDER — DULOXETIN HYDROCHLORIDE 60 MG/1
60 CAPSULE, DELAYED RELEASE ORAL DAILY
Qty: 30 CAPSULE | Refills: 5 | Status: SHIPPED | OUTPATIENT
Start: 2022-08-05

## 2022-08-05 RX ORDER — HYDROCHLOROTHIAZIDE 25 MG/1
25 TABLET ORAL DAILY
Qty: 30 TABLET | Refills: 5 | Status: SHIPPED | OUTPATIENT
Start: 2022-08-05

## 2022-08-05 RX ORDER — LANOLIN ALCOHOL/MO/W.PET/CERES
0.4 CREAM (GRAM) TOPICAL DAILY
Qty: 30 TABLET | Refills: 5 | Status: SHIPPED | OUTPATIENT
Start: 2022-08-05

## 2022-08-05 RX ORDER — POTASSIUM CHLORIDE 20 MEQ/1
20 TABLET, EXTENDED RELEASE ORAL DAILY
Qty: 30 TABLET | Refills: 5 | Status: SHIPPED | OUTPATIENT
Start: 2022-08-05 | End: 2022-09-13 | Stop reason: SDUPTHER

## 2022-08-05 RX ORDER — LEVOTHYROXINE SODIUM 0.05 MG/1
50 TABLET ORAL DAILY
Qty: 30 TABLET | Refills: 5 | Status: SHIPPED | OUTPATIENT
Start: 2022-08-05 | End: 2022-09-13 | Stop reason: SDUPTHER

## 2022-08-05 RX ORDER — OMEPRAZOLE 40 MG/1
40 CAPSULE, DELAYED RELEASE ORAL DAILY
Qty: 30 CAPSULE | Refills: 5 | Status: SHIPPED | OUTPATIENT
Start: 2022-08-05

## 2022-08-08 ENCOUNTER — TELEPHONE (OUTPATIENT)
Dept: SLEEP MEDICINE | Age: 62
End: 2022-08-08

## 2022-08-08 RX ORDER — PREDNISONE 1 MG/1
5 TABLET ORAL
Qty: 30 TABLET | Refills: 0 | Status: SHIPPED | OUTPATIENT
Start: 2022-08-08

## 2022-08-08 NOTE — TELEPHONE ENCOUNTER
Medications have been sent to [facility] DR KATHLEEN WARD in [place], 30 days worth of each with no refills. . However, pt has not been seen in the office recently. No other refills will be given until she is seen in the office.

## 2022-08-19 ENCOUNTER — TELEPHONE (OUTPATIENT)
Dept: PULMONOLOGY | Age: 62
End: 2022-08-19

## 2022-08-19 DIAGNOSIS — R91.1 SOLITARY PULMONARY NODULE: Primary | ICD-10-CM

## 2022-08-19 NOTE — TELEPHONE ENCOUNTER
Left patient message via voicemail to please give me a call as soon as they are available. // Kaylee TO

## 2022-08-19 NOTE — TELEPHONE ENCOUNTER
----- Message from Sajan Whitten MD sent at 8/14/2022 12:48 PM EDT -----  Can you let the patient know that her screening CT had some very small nodules that we need to monitor with repeat CT scan in 6 months. I do not see a follow up appointment for her. Can we set that up first available? Thanks.    Sajan Whitten MD

## 2022-08-23 NOTE — TELEPHONE ENCOUNTER
Left patient message via voicemail to please give me a call as soon as they are available. // Marleni TO

## 2022-08-25 NOTE — TELEPHONE ENCOUNTER
Patient has been notified via [facility] [address] CT has been established to be completed in 6 months. Schedulers, can you please get this patient scheduled for an appointment?   Thank you so much!// Marta Humphreys

## 2022-08-26 RX ORDER — PREDNISONE 1 MG/1
TABLET ORAL
Qty: 30 TABLET | Refills: 1 | OUTPATIENT
Start: 2022-08-26

## 2022-09-07 NOTE — TELEPHONE ENCOUNTER
Patient Refill Request     Last Office Visit: 12/02/21 with Dr. Apoorva Pandey     When they were supposed to follow up: 6 months     Upcoming Office Visit: 09/27/22 with Dr. Graciela Jamison Requested: Breo 200 mcg     Type of refill: 30 days     [facility]acy[id number]     Is prescription pended?  Yes

## 2022-09-08 RX ORDER — FLUTICASONE FUROATE AND VILANTEROL 200; 25 UG/1; UG/1
1 POWDER RESPIRATORY (INHALATION) DAILY
Qty: 1 EACH | Refills: 2 | Status: SHIPPED | OUTPATIENT
Start: 2022-09-08 | End: 2022-09-27 | Stop reason: SDUPTHER

## 2022-09-09 ENCOUNTER — TELEPHONE (OUTPATIENT)
Dept: PULMONOLOGY | Age: 62
End: 2022-09-09

## 2022-09-09 NOTE — TELEPHONE ENCOUNTER
The prescription     rivaroxaban (XARELTO) 20 MG TABS tablet     Needs a prior authorization and she has an appt 9/27/22 it also went to the wrong pharmacy. It is suppose to go to [facility] [address] .  She has 1 for today and then she is out

## 2022-09-13 RX ORDER — ERGOCALCIFEROL (VITAMIN D2) 1250 MCG
50000 CAPSULE ORAL WEEKLY
Qty: 4 CAPSULE | Refills: 5 | Status: SHIPPED | OUTPATIENT
Start: 2022-09-13

## 2022-09-13 RX ORDER — LEVOTHYROXINE SODIUM 50 MCG
50 TABLET ORAL DAILY
Qty: 30 TABLET | Refills: 5 | Status: SHIPPED | OUTPATIENT
Start: 2022-09-13 | End: 2022-09-29 | Stop reason: SDUPTHER

## 2022-09-13 RX ORDER — CHLORAL HYDRATE 500 MG
CAPSULE ORAL
Qty: 120 CAPSULE | Refills: 5 | Status: SHIPPED | OUTPATIENT
Start: 2022-09-13 | End: 2022-09-29 | Stop reason: SDUPTHER

## 2022-09-13 RX ORDER — POTASSIUM CHLORIDE 20 MEQ/1
TABLET, EXTENDED RELEASE ORAL
Qty: 120 TABLET | Refills: 5 | Status: SHIPPED | OUTPATIENT
Start: 2022-09-13

## 2022-09-13 NOTE — TELEPHONE ENCOUNTER
Patient Refill Request     Last Office Visit: 12/02/21 with Dr. Isrrael Saavedra      When they were supposed to follow up: 6 months     Upcoming Office Visit: 09/27/22 with Dr. Shelbie Spears Requested: Xarelto 20 mg     Type of refill: 30 day     Requested Pharmacy: [facility]     Is prescription pended? Yes    Dr. Reyna Jackson, just wanted to let you know that patient was supposed to see you on 09/06/22 but no showed that appointment. I pended the medication with 30 tablets with 0 refills and stated on the prescription that she will need to keep this upcoming appointment to get further refills.   // Khushbu Lantigua

## 2022-09-15 NOTE — TELEPHONE ENCOUNTER
Per [address] post communication with [facility] [place] for Xarelto is in clinical review and should have answer today. I have spoken with office of Dr Melonie Ching, who has samples of 15 mg tablets of Xarelto. I have spoken with Dr Yanira Angel who approves patient taking 15 mg of Xarelto daily until answer from [facility] regarding PA is obtained to avoid disruption. I have spoken with patient. She has run out of Xarelto at this time. We have discussed dangers of Xarelto interruption and she voices understanding. She is aware that Dr Yanira Angel has approved her taking 1 x 15 mg Xarelto daily until insurance reviews PA. Jyotsna Aggarwal at Dr Wilson's office will hold samples for patient who will  today.

## 2022-09-15 NOTE — TELEPHONE ENCOUNTER
Noted that PCP filled Xarelto on 9/13/2022. I spoken with TH and PA is in process. I have left a message for patient to return call to office to assure she does not have current need regarding Xarelto. I have spoken with [facility] and patient has not obtained Xarelto due to waiting for PA. Office does not have any current samples. Await patient return call. Per TH should be response soon regarding PA from [facility].

## 2022-09-15 NOTE — TELEPHONE ENCOUNTER
Called patient and informed her that we do not have any samples in the office and we are waiting for [facility] to respond to the PA. Patient states that she had a DVT when she had COVID but she was too stubborn to go to the doctor but her pain doctor told her that her nerves could make her pain worse. She said that she has 2 boxes of Lovenox that she had to use for a colonoscopy that she had to cancel, she wants to know if she can use these if she can't get any samples. She also wanted to know if Dr. Deloris Estrella could call in a few more Lovenox injections to get her through the weekend until we can get her PA from [facility]. She would like for Ember to return her call.

## 2022-09-16 NOTE — TELEPHONE ENCOUNTER
Received fax from [facility] requesting more information in order to review the auth for Xarelto. To further review this authorization request documentation of ALL the following information is needed: Your doctor must show you are not having bad side effects from the requested drug. 2. Your doctor must show you still need the requested drug (review of continued medical necessity). I have faxed note from Ember to [facility] at [phone] stating Dr. Fredy Witt wants patient to continue using Xarelto 15mg daily and the possible dangers of interruption of this medication.    Case Number: PA# [id number]

## 2022-09-20 NOTE — PROGRESS NOTES
Mariana to [place], [facility] [place], per patient request post chart review.

## 2022-09-27 ENCOUNTER — OFFICE VISIT (OUTPATIENT)
Dept: PULMONOLOGY | Age: 62
End: 2022-09-27
Payer: MEDICAID

## 2022-09-27 VITALS
BODY MASS INDEX: 28.52 KG/M2 | WEIGHT: 155 LBS | HEIGHT: 62 IN | TEMPERATURE: 98 F | DIASTOLIC BLOOD PRESSURE: 80 MMHG | SYSTOLIC BLOOD PRESSURE: 120 MMHG | HEART RATE: 96 BPM | OXYGEN SATURATION: 98 % | RESPIRATION RATE: 20 BRPM

## 2022-09-27 DIAGNOSIS — G47.34 NOCTURNAL HYPOXIA: ICD-10-CM

## 2022-09-27 DIAGNOSIS — J44.1 CHRONIC OBSTRUCTIVE PULMONARY DISEASE WITH (ACUTE) EXACERBATION (HCC): Primary | ICD-10-CM

## 2022-09-27 DIAGNOSIS — Z87.891 PERSONAL HISTORY OF TOBACCO USE, PRESENTING HAZARDS TO HEALTH: ICD-10-CM

## 2022-09-27 DIAGNOSIS — R91.1 LUNG NODULE: ICD-10-CM

## 2022-09-27 PROCEDURE — 99214 OFFICE O/P EST MOD 30 MIN: CPT | Performed by: INTERNAL MEDICINE

## 2022-09-27 RX ORDER — CETIRIZINE HYDROCHLORIDE 10 MG/1
10 TABLET ORAL DAILY
Qty: 30 TABLET | Refills: 11 | Status: SHIPPED | OUTPATIENT
Start: 2022-09-27

## 2022-09-27 RX ORDER — DIPHENHYDRAMINE HCL 25 MG
25 CAPSULE ORAL NIGHTLY PRN
Qty: 30 CAPSULE | Refills: 11 | Status: SHIPPED | OUTPATIENT
Start: 2022-09-27 | End: 2022-10-07

## 2022-09-27 RX ORDER — IPRATROPIUM BROMIDE AND ALBUTEROL SULFATE 2.5; .5 MG/3ML; MG/3ML
1 SOLUTION RESPIRATORY (INHALATION) EVERY 4 HOURS PRN
Qty: 360 ML | Refills: 11 | Status: SHIPPED | OUTPATIENT
Start: 2022-09-27

## 2022-09-27 RX ORDER — GUAIFENESIN 600 MG/1
600 TABLET, EXTENDED RELEASE ORAL 2 TIMES DAILY
Qty: 60 TABLET | Refills: 11 | Status: SHIPPED | OUTPATIENT
Start: 2022-09-27

## 2022-09-27 RX ORDER — ALBUTEROL SULFATE 90 UG/1
2 AEROSOL, METERED RESPIRATORY (INHALATION) EVERY 4 HOURS PRN
Qty: 18 G | Refills: 11 | Status: SHIPPED | OUTPATIENT
Start: 2022-09-27

## 2022-09-27 RX ORDER — FLUTICASONE FUROATE AND VILANTEROL 200; 25 UG/1; UG/1
1 POWDER RESPIRATORY (INHALATION) DAILY
Qty: 1 EACH | Refills: 11 | Status: SHIPPED | OUTPATIENT
Start: 2022-09-27

## 2022-09-27 NOTE — PROGRESS NOTES
CT chest but this was not done until April 2022. It showed several new small pulmonary nodules with rec for repeat in 6 months. Today she states that she was seen by Dr Love Rosas since her last appt with me. She had worsening symptoms. She was treated with steroids and doxy. Did not get better. Tested positive for covid. Brought to ER here and allowed to go home. She states she felt sick until April. Reports fever, vomiting, weakness. She was treated with hydroxychloroquine and ivermectin. Tells me she is getting bills for her nocturnal O2. She states she has called her insurance and is told a grievance is pending. Gets O2 through [facility]. She is on 2L O2 at night. She is still on Breo 200. She is still on prednisone 5mg daily. Discussed risk and benefits of being on long term steroids. REVIEW OF SYSTEMS: 10 point review of systems is negative except as reported in HPI. PHYSICAL EXAM: There is no height or weight on file to calculate BMI. There were no vitals filed for this visit. General:   Alert, cooperative, no distress, appears stated age. Eyes:   Conjunctivae/corneas clear. PERRL        Mouth/Throat:  Lips, mucosa, and tongue normal. Teeth and gums normal.        Lungs:     CTA B, no w/r/r     Heart:   Regular rate and rhythm, S1, S2 normal, no murmur, click, rub or gallop. Abdomen:    Soft, non-tender. Extremities:  Extremities normal, atraumatic, no cyanosis or edema. Skin:  Skin color normal. No rashes or lesions     Neurologic:  A&Ox3     DIAGNOSTIC TESTS:                                                                                    LABS:   Lab Results   Component Value Date/Time    WBC 4.7 05/27/2022 02:22 PM    HGB 12.7 05/27/2022 02:22 PM    HCT 38.4 05/27/2022 02:22 PM     05/27/2022 02:22 PM    TSH 1.500 01/06/2022 02:54 PM     Imaging: I performed an independent interpretation of the patient's images.   CXR:   XR CHEST PORTABLE 01/29/2022    Narrative  EXAM: Chest x-ray. INDICATION: Dyspnea. COMPARISON: Prior chest x-ray on May 14, 2019. TECHNIQUE: Frontal view chest x-ray. FINDINGS: There are new hazy opacities in the lower lung fields bilaterally,  possibly viral pneumonia. The heart size, mediastinal contour and pulmonary  vasculature are normal. No pneumothorax or pleural effusion is seen. Impression  Hazy infiltrates in the bilateral lower lung fields, possibly viral  pneumonia. CT Chest:   CT CHEST WO CONTRAST 04/06/2022    Narrative  This is a summary report. The complete report is available in the patient's medical record. If you cannot access the medical record, please contact the sending organization for a detailed fax or copy. CT CHEST    INDICATION: Persistent shortness of breath post Covid    Multiple axial images were obtained through the chest without IV contrast.  Radiation dose reduction techniques were used for this study: All CT scans  performed at this facility use one or all of the following: Automated exposure  control, adjustment of the mA and/or kVp according to patient's size, iterative  reconstruction. COMPARISON: 10/25/2019    FINDINGS:  - LUNGS: There are few areas of faint groundglass density in the periphery of  the right lung base, probably scarring associated with Covid. There are several  small bilateral pulmonary nodules. Largest is an oval 7 mm well-defined nodule  in the lateral right lung base. There is mild emphysema. - MEDIASTINUM/AXILLA: No significant adenopathy.    - HEART/VESSELS: Normal.  - CHEST WALL/BONES: Normal.  - UPPER ABDOMEN: No acute findings. Impression  1. Several small well-defined pulmonary nodules which are probably benign, but  do appear new compared to the prior chest CT. Follow-up CT in 3-6 months is  recommended. 2.  Mild emphysema.       [address]:   [place] MYOCARDIAL PERFUSION MULTIPLE     PFTs:   No flowsheet data found. No results found for this or any previous visit. No results found for this or any previous visit. FeNO: No results found for this or any previous visit. FeNO and Likelihood of Eosinophilic Asthma   Unlikely Intermediate Likely   <25 ppb 25-50 ppb >50ppb   Exercise Oximetry:  Echo: No results found for this or any previous visit from the past 3650 days. [facility] Reference Info:                                                                                                                  Past Medical History:   Diagnosis Date    Anemia     Arrhythmia     Diagnosed , patient believes now resolved. Patient has no cardiologist    Arthritis     Osteoarthritis diagnosed [id number]    Asthma     Diagnosed- can't recall. Carpal tunnel syndrome     left    Chronic obstructive pulmonary disease (HCC)     Chronic pain     diagnosed  severe fibromyalgia and  infarcts pleurisy from PE.    DDD (degenerative disc disease), cervical     Depression     Diagnosed      Dysphagia     Facial abscess     Factor 5 Leiden mutation, heterozygous (HCC)     Fibromyalgia     Gait instability     GERD (gastroesophageal reflux disease)     Diagnosed . Hypercholesterolemia     Diagnosed . Hypertension     Diagnose      Lung nodule     Multiple falls     Other ill-defined conditions(799.89)     IBS< Fibra    PTSD (post-traumatic stress disorder)     Scoliosis     Thromboembolus ([address]     Diagnosed  DVT    Thyroid disease     hypothryroidism diagnosed approx . Trauma     PTSD diagnosed .         Tobacco Use      Smoking status: Former        Packs/day: 1.00        Types: Cigarettes        Quit date: 2016        Years since quittin.0      Smokeless tobacco: Never    Allergies   Allergen Reactions    Ciprofloxacin Anaphylaxis    Codeine Other (See Comments)     syncopal     Current Outpatient Medications   Medication Instructions    albuterol sulfate HFA (VENTOLIN HFA) 108 (90 Base) MCG/ACT inhaler INHALE 2 PUFFS BY MOUTH EVERY 4 HOURS AS NEEDED FOR WHEEZING    ALPRAZolam (XANAX) 1 mg, Oral, 3 TIMES DAILY PRN    amLODIPine (NORVASC) 5 mg, Oral, DAILY    ascorbic acid (VITAMIN C) 500 mg, Oral, DAILY    atorvastatin (LIPITOR) 20 mg, Oral, DAILY    baclofen (LIORESAL) 10 mg, Oral, 3 TIMES DAILY    benzonatate (TESSALON) 200 MG capsule Take 1 capsule by mouth three times daily as needed for cough    benzonatate (TESSALON) 200 mg, Oral, 3 TIMES DAILY PRN    Biotin 2.5 MG CAPS 1 tablet, Oral, DAILY    buPROPion (WELLBUTRIN XL) 300 mg, Oral, EVERY MORNING    CALCIUM PO Oral    cetirizine (ZYRTEC) 10 mg, Oral    Cholecalciferol 2,000 Units, Oral, DAILY    Cyanocobalamin ER 1,000 mcg, Oral, DAILY    DULoxetine (CYMBALTA) 60 mg, Oral, DAILY    ergocalciferol (ERGOCALCIFEROL) 50,000 Units, Oral, WEEKLY, Take 1 capsule by mouth once a week    famotidine (PEPCID) 40 mg, Oral, DAILY    fluticasone (FLONASE) 50 MCG/ACT nasal spray USE 2 SPRAY(S) IN EACH NOSTRIL ONCE DAILY AS NEEDED    Fluticasone furoate-vilanterol (BREO ELLIPTA) 200-25 MCG/INH AEPB inhaler 1 puff, Inhalation, DAILY    folic acid (FOLVITE) 0.4 mg, Oral, DAILY    gabapentin (NEURONTIN) 300 mg, Oral, 3 TIMES DAILY    guaiFENesin (MUCINEX) 600 MG extended release tablet Take 2 tablets by mouth twice daily    hydroCHLOROthiazide (HYDRODIURIL) 25 mg, Oral, DAILY    ipratropium (ATROVENT) 0.03 % nasal spray USE 2 SPRAY(S) IN EACH NOSTRIL TWICE DAILY    ipratropium-albuterol (DUONEB) 0.5-2.5 (3) MG/3ML SOLN nebulizer solution USE 1 AMPULE IN NEBULIZER 4 TIMES DAILY    ketotifen (ZADITOR) 0.025 % ophthalmic solution INSTILL 1 DROPS INTO EACH EYE TWICE DAILY FOR 10 DAYS    loratadine (CLARITIN) 10 mg, Oral, DAILY    lubiprostone (AMITIZA) 24 MCG capsule TAKE 1 CAPSULE BY MOUTH TWICE DAILY WITH MEALS    morphine (JAMSHID) 20 mg, Oral, EVERY 24 HOURS    olopatadine (PATANASE) 0.6 % SOLN nassl soln Nasal    omega-3 acid ethyl esters (LOVAZA) 1 g capsule TAKE 2 CAPS BY MOUTH TWO TIMES A DAY    Omega-3 Fatty Acids (FISH OIL) 1000 MG CAPS Take 2 capsules by mouth twice daily    omeprazole (PRILOSEC) 40 mg, Oral, DAILY    polyethylene glycol (GLYCOLAX) 17 g, Oral, DAILY PRN    potassium chloride (KLOR-CON M) 20 MEQ extended release tablet 2 twice daily    predniSONE (DELTASONE) 5 mg, Oral, DAILY WITH BREAKFAST    rivaroxaban (XARELTO) 20 mg, Oral, DAILY, TAKE 1 TABLET BY MOUTH ONCE DAILY    Synthroid 50 mcg, Oral, DAILY    traMADol (ULTRAM) 50 mg, Oral, 3 TIMES DAILY PRN Family

## 2022-09-29 RX ORDER — CHLORAL HYDRATE 500 MG
CAPSULE ORAL
Qty: 120 CAPSULE | Refills: 5 | Status: SHIPPED | OUTPATIENT
Start: 2022-09-29

## 2022-09-29 RX ORDER — LEVOTHYROXINE SODIUM 50 MCG
50 TABLET ORAL DAILY
Qty: 30 TABLET | Refills: 5 | Status: SHIPPED | OUTPATIENT
Start: 2022-09-29

## 2022-09-30 RX ORDER — GUAIFENESIN 600 MG/1
TABLET, EXTENDED RELEASE ORAL
Qty: 120 TABLET | Refills: 6 | OUTPATIENT
Start: 2022-09-30

## 2022-09-30 NOTE — TELEPHONE ENCOUNTER
Patient saw Dr. Shree Claire on 09/27/22 and filled a prescription for Mucinex at visit. // Cornel Krishnamurthy

## 2022-10-04 RX ORDER — OMEGA-3-ACID ETHYL ESTERS 1 G/1
CAPSULE, LIQUID FILLED ORAL
Qty: 120 CAPSULE | Refills: 5 | Status: SHIPPED | OUTPATIENT
Start: 2022-10-04

## 2022-10-24 ENCOUNTER — HOSPITAL ENCOUNTER (OUTPATIENT)
Dept: MAMMOGRAPHY | Age: 62
Discharge: HOME | End: 2022-10-27
Payer: MEDICAID

## 2022-10-24 PROCEDURE — 77067 SCR MAMMO BI INCL CAD: CPT

## 2022-11-08 ENCOUNTER — TELEPHONE (OUTPATIENT)
Dept: SLEEP MEDICINE | Age: 62
End: 2022-11-08

## 2022-11-08 NOTE — TELEPHONE ENCOUNTER
Pt called refill line stating a Prior Auth is needed for Breo and she also needs a refill for Loratadine

## 2022-11-10 NOTE — TELEPHONE ENCOUNTER
[place] [facility] sent to Don Dailey via [employer].  KEY: [id number]    [facility] [place] and valid from 11/15/22-11/15/23  PA# [id number]

## 2022-11-16 ENCOUNTER — HOSPITAL ENCOUNTER (EMERGENCY)
Age: 62
Discharge: HOME | End: 2022-11-16
Attending: EMERGENCY MEDICINE
Payer: MEDICAID

## 2022-11-16 ENCOUNTER — APPOINTMENT (OUTPATIENT)
Dept: GENERAL RADIOLOGY | Age: 62
End: 2022-11-16
Payer: MEDICAID

## 2022-11-16 ENCOUNTER — APPOINTMENT (OUTPATIENT)
Dept: ULTRASOUND IMAGING | Age: 62
End: 2022-11-16
Payer: MEDICAID

## 2022-11-16 VITALS
HEART RATE: 102 BPM | OXYGEN SATURATION: 99 % | RESPIRATION RATE: 18 BRPM | DIASTOLIC BLOOD PRESSURE: 74 MMHG | TEMPERATURE: 99 F | SYSTOLIC BLOOD PRESSURE: 103 MMHG

## 2022-11-16 DIAGNOSIS — S20.212A RIB CONTUSION, LEFT, INITIAL ENCOUNTER: ICD-10-CM

## 2022-11-16 DIAGNOSIS — N30.00 ACUTE CYSTITIS WITHOUT HEMATURIA: ICD-10-CM

## 2022-11-16 DIAGNOSIS — M79.604 RIGHT LEG PAIN: Primary | ICD-10-CM

## 2022-11-16 LAB
ALBUMIN SERPL-MCNC: 3.8 G/DL (ref 3.2–4.6)
ALBUMIN/GLOB SERPL: 1.2 {RATIO} (ref 0.4–1.6)
ALP SERPL-CCNC: 79 U/L (ref 50–136)
ALT SERPL-CCNC: 28 U/L (ref 12–65)
ANION GAP SERPL CALC-SCNC: 7 MMOL/L (ref 2–11)
APPEARANCE UR: ABNORMAL
AST SERPL-CCNC: 32 U/L (ref 15–37)
BACTERIA URNS QL MICRO: NEGATIVE /HPF
BASOPHILS # BLD: 0 K/UL (ref 0–0.2)
BASOPHILS NFR BLD: 0 % (ref 0–2)
BILIRUB SERPL-MCNC: 0.4 MG/DL (ref 0.2–1.1)
BILIRUB UR QL: NEGATIVE
BILIRUB UR QL: NEGATIVE
BUN SERPL-MCNC: 12 MG/DL (ref 8–23)
CALCIUM SERPL-MCNC: 9.3 MG/DL (ref 8.3–10.4)
CASTS URNS QL MICRO: ABNORMAL /LPF
CHLORIDE SERPL-SCNC: 104 MMOL/L (ref 101–110)
CO2 SERPL-SCNC: 28 MMOL/L (ref 21–32)
COLOR UR: ABNORMAL
CREAT SERPL-MCNC: 1 MG/DL (ref 0.6–1)
DIFFERENTIAL METHOD BLD: ABNORMAL
EOSINOPHIL # BLD: 0.2 K/UL (ref 0–0.8)
EOSINOPHIL NFR BLD: 4 % (ref 0.5–7.8)
EPI CELLS #/AREA URNS HPF: ABNORMAL /HPF
ERYTHROCYTE [DISTWIDTH] IN BLOOD BY AUTOMATED COUNT: 12 % (ref 11.9–14.6)
GLOBULIN SER CALC-MCNC: 3.2 G/DL (ref 2.8–4.5)
GLUCOSE SERPL-MCNC: 96 MG/DL (ref 65–100)
GLUCOSE UR QL STRIP.AUTO: NEGATIVE MG/DL
GLUCOSE UR STRIP.AUTO-MCNC: NEGATIVE MG/DL
HCT VFR BLD AUTO: 39.9 % (ref 35.8–46.3)
HGB BLD-MCNC: 13.6 G/DL (ref 11.7–15.4)
HGB UR QL STRIP: ABNORMAL
IMM GRANULOCYTES # BLD AUTO: 0 K/UL (ref 0–0.5)
IMM GRANULOCYTES NFR BLD AUTO: 0 % (ref 0–5)
KETONES UR QL STRIP.AUTO: NEGATIVE MG/DL
KETONES UR-MCNC: NEGATIVE MG/DL
LEUKOCYTE ESTERASE UR QL STRIP.AUTO: ABNORMAL
LEUKOCYTE ESTERASE UR QL STRIP: ABNORMAL
LYMPHOCYTES # BLD: 1.7 K/UL (ref 0.5–4.6)
LYMPHOCYTES NFR BLD: 29 % (ref 13–44)
MCH RBC QN AUTO: 34 PG (ref 26.1–32.9)
MCHC RBC AUTO-ENTMCNC: 34.1 G/DL (ref 31.4–35)
MCV RBC AUTO: 99.8 FL (ref 82–102)
MONOCYTES # BLD: 0.4 K/UL (ref 0.1–1.3)
MONOCYTES NFR BLD: 7 % (ref 4–12)
NEUTS SEG # BLD: 3.5 K/UL (ref 1.7–8.2)
NEUTS SEG NFR BLD: 60 % (ref 43–78)
NITRITE UR QL STRIP.AUTO: NEGATIVE
NITRITE UR QL: NEGATIVE
NRBC # BLD: 0 K/UL (ref 0–0.2)
NT PRO BNP: 52 PG/ML (ref 5–125)
PH UR STRIP: 5.5 [PH] (ref 5–9)
PH UR: 5.5 [PH] (ref 5–9)
PLATELET # BLD AUTO: 301 K/UL (ref 150–450)
PMV BLD AUTO: 9.2 FL (ref 9.4–12.3)
POTASSIUM SERPL-SCNC: 3.4 MMOL/L (ref 3.5–5.1)
PROT SERPL-MCNC: 7 G/DL (ref 6.3–8.2)
PROT UR QL: NEGATIVE MG/DL
PROT UR STRIP-MCNC: NEGATIVE MG/DL
RBC # BLD AUTO: 4 M/UL (ref 4.05–5.2)
RBC # UR STRIP: ABNORMAL /UL
RBC #/AREA URNS HPF: ABNORMAL /HPF
SERVICE CMNT-IMP: ABNORMAL
SODIUM SERPL-SCNC: 139 MMOL/L (ref 133–143)
SP GR UR REFRACTOMETRY: 1.01 (ref 1–1.02)
SP GR UR: 1.01 (ref 1–1.02)
UROBILINOGEN UR QL STRIP.AUTO: 0.2 EU/DL (ref 0.2–1)
UROBILINOGEN UR QL: 0.2 EU/DL (ref 0.2–1)
WBC # BLD AUTO: 5.8 K/UL (ref 4.3–11.1)
WBC URNS QL MICRO: ABNORMAL /HPF

## 2022-11-16 PROCEDURE — 81003 URINALYSIS AUTO W/O SCOPE: CPT

## 2022-11-16 PROCEDURE — 99284 EMERGENCY DEPT VISIT MOD MDM: CPT

## 2022-11-16 PROCEDURE — 83880 ASSAY OF NATRIURETIC PEPTIDE: CPT

## 2022-11-16 PROCEDURE — 80053 COMPREHEN METABOLIC PANEL: CPT

## 2022-11-16 PROCEDURE — 93971 EXTREMITY STUDY: CPT

## 2022-11-16 PROCEDURE — 71101 X-RAY EXAM UNILAT RIBS/CHEST: CPT

## 2022-11-16 PROCEDURE — 81001 URINALYSIS AUTO W/SCOPE: CPT

## 2022-11-16 PROCEDURE — 85025 COMPLETE CBC W/AUTO DIFF WBC: CPT

## 2022-11-16 RX ORDER — CEPHALEXIN 500 MG/1
500 CAPSULE ORAL 3 TIMES DAILY
Qty: 21 CAPSULE | Refills: 0 | Status: SHIPPED | OUTPATIENT
Start: 2022-11-16 | End: 2022-11-23

## 2022-11-16 ASSESSMENT — PAIN - FUNCTIONAL ASSESSMENT: PAIN_FUNCTIONAL_ASSESSMENT: 0-10

## 2022-11-16 ASSESSMENT — PAIN SCALES - GENERAL: PAINLEVEL_OUTOF10: 7

## 2022-11-16 NOTE — DISCHARGE INSTRUCTIONS
Use At home pain medicines and blood thinners all as directed. Ice to all sore areas.   See your primary care physician for routine recheck

## 2022-11-16 NOTE — ED PROVIDER NOTES
[facility] Emergency Department Provider Note                   PCP:                Tessie Rushing MD               Age: 62 y.o. Sex: female       ICD-10-CM    1. Right leg pain  M79.604       2. Rib contusion, left, initial encounter  S20.212A       3. Acute cystitis without hematuria  N30.00           DISPOSITION Decision To Discharge 11/16/2022 06:47:46 PM       New Prescriptions    CEPHALEXIN (KEFLEX) 500 MG CAPSULE    Take 1 capsule by mouth 3 times daily for 7 days       Orders Placed This Encounter   Procedures    XR RIBS LEFT INCLUDE CHEST (MIN 3 VIEWS)    CBC with Auto Differential    Comprehensive Metabolic Panel    Brain Natriuretic Peptide    Urinalysis w rflx microscopic    Urine dipstick    POCT Urinalysis no Micro    Vascular duplex lower extremity venous right         Dez Garrett is a 62 y.o. female who presents to the Emergency Department with chief complaint of    Chief Complaint   Patient presents with    Leg Pain      Patient to ER complaining of worsening right leg pain and swelling for the past 2 to 3 days. Patient has a history of DVT, states she is on Xarelto but did not take it today. She did take at home chronic pain medications. She also complains of left rib pain from a fall last Thursday when she fell against a piece of furniture. She denies any fever no productive cough. States pain has not gotten any better with either or at home pain medications. Not seen her primary care physician for this problem. She denies any head injury when she fell. No headache or dizziness    Past Medical History:  No date: Anemia  No date: Arrhythmia      Comment:  Diagnosed 2010, patient believes now resolved. Patient                has no cardiologist  No date: Arthritis      Comment:  Osteoarthritis diagnosed [id number]  No date: Asthma      Comment:  Diagnosed- can't recall.   No date: Carpal tunnel syndrome      Comment:  left  No date: Chronic obstructive pulmonary disease (HCC)  No date: Chronic pain      Comment:  diagnosed 1994 severe fibromyalgia and 2001 infarcts                pleurisy from PE. No date: DDD (degenerative disc disease), cervical  No date: Depression      Comment:  Diagnosed 2002   No date: Dysphagia  No date: Facial abscess  No date: Factor 5 Leiden mutation, heterozygous ([address]  No date: Fibromyalgia  No date: Gait instability  No date: GERD (gastroesophageal reflux disease)      Comment:  Diagnosed 2001. No date: Hypercholesterolemia      Comment:  Diagnosed 2011. No date: Hypertension      Comment:  Diagnose 2001   No date: Lung nodule  No date: Multiple falls  No date: Other ill-defined conditions(799.89)      Comment:  IBS< Fibra  No date: PTSD (post-traumatic stress disorder)  No date: Scoliosis  No date: Thromboembolus [facility])      Comment:  Diagnosed 2001 DVT  No date: Thyroid disease      Comment:  hypothryroidism diagnosed approx 2013. No date: Trauma      Comment:  PTSD diagnosed 1999. Past Surgical History:  4/15/2019: COLONOSCOPY; N/A      Comment:  COLONOSCOPY BMI 30 performed by Radha Uribe MD at [facility]                ENDOSCOPY  No date: GYN      Comment:  2 D&C due to miscarriages. Year 1984 and [address]: HEENT      Comment:  3 lumps removed from neck  2018: ORTHOPEDIC SURGERY; Left      Comment:  carpal tunnel x 2  No date: ORTHOPEDIC SURGERY; Right      Comment:  carpal tunel  No date: ORTHOPEDIC SURGERY; Right      Comment:  trigger fingers  2013: ORTHOPEDIC SURGERY; Right      Comment:  transfer tendon          Review of Systems   All other systems reviewed and are negative. All other systems reviewed and are negative. Past Medical History:   Diagnosis Date    Anemia     Arrhythmia     Diagnosed 2010, patient believes now resolved. Patient has no cardiologist    Arthritis     Osteoarthritis diagnosed [id number]    Asthma     Diagnosed- can't recall.     Carpal tunnel syndrome     left    Chronic obstructive pulmonary disease (HCC)     Chronic pain     diagnosed 1994 severe fibromyalgia and 2001 infarcts pleurisy from PE.    DDD (degenerative disc disease), cervical     Depression     Diagnosed 2002     Dysphagia     Facial abscess     Factor 5 Leiden mutation, heterozygous (HCC)     Fibromyalgia     Gait instability     GERD (gastroesophageal reflux disease)     Diagnosed 2001. Hypercholesterolemia     Diagnosed 2011. Hypertension     Diagnose 2001     Lung nodule     Multiple falls     Other ill-defined conditions(799.89)     IBS< Fibra    PTSD (post-traumatic stress disorder)     Scoliosis     Thromboembolus ([address]     Diagnosed 2001 DVT    Thyroid disease     hypothryroidism diagnosed approx 2013. Trauma     PTSD diagnosed 1999. Past Surgical History:   Procedure Laterality Date    COLONOSCOPY N/A 4/15/2019    COLONOSCOPY BMI 30 performed by Silva Pascal MD at [facility] ENDOSCOPY    GYN      2 D&C due to miscarriages. Year 1984 and 1992. HEENT  1978    3 lumps removed from neck    ORTHOPEDIC SURGERY Left 2018    carpal tunnel x 2    ORTHOPEDIC SURGERY Right     carpal tunel    ORTHOPEDIC SURGERY Right     trigger fingers    ORTHOPEDIC SURGERY Right 2013    transfer tendon        Family History   Problem Relation Age of Onset    No Known Problems Father     No Known Problems Paternal Grandfather     No Known Problems Paternal Grandmother     No Known Problems Brother     Clotting Disorder Mother     Cancer Maternal Grandfather     Heart Disease Maternal Grandfather     Clotting Disorder Maternal Grandfather     Cancer Maternal Grandmother     Cancer Maternal Uncle     Clotting Disorder Maternal Aunt         Social Connections: Not on file        Allergies   Allergen Reactions    Ciprofloxacin Anaphylaxis    Codeine Other (See Comments)     syncopal        Vitals signs and nursing note reviewed.    Patient Vitals for the past 4 hrs:   Temp Pulse Resp BP SpO2   11/16/22 2002 99 °F (37.2 °C) (!) 102 18 103/74 99 %   11/16/22 1614 98.7 °F (37.1 °C) (!) 107 15 (!) 96/52 99 %          Physical Exam  Vitals and nursing note reviewed. Constitutional:       Appearance: Normal appearance. She is obese. HENT:      Head: Normocephalic and atraumatic. Right Ear: Tympanic membrane and external ear normal.      Left Ear: Tympanic membrane and external ear normal.      Nose: Nose normal.      Mouth/Throat:      Mouth: Mucous membranes are moist.      Pharynx: Oropharynx is clear. Eyes:      Extraocular Movements: Extraocular movements intact. Pupils: Pupils are equal, round, and reactive to light. Cardiovascular:      Rate and Rhythm: Normal rate and regular rhythm. Pulses: Normal pulses. Heart sounds: Normal heart sounds. Pulmonary:      Effort: Pulmonary effort is normal.      Breath sounds: Normal breath sounds. No rales. Comments: Pain to palpation left lateral rib area. No point tenderness. Lungs are clear. No skin changes noted  Chest:      Chest wall: Tenderness present. Abdominal:      General: Abdomen is flat. Palpations: Abdomen is soft. Tenderness: There is no abdominal tenderness. Hernia: No hernia is present. Musculoskeletal:         General: Tenderness present. Normal range of motion. Cervical back: Normal range of motion and neck supple. Comments: Tenderness to palpation right lateral thigh area, patient has mild edema to the right ankle area not pitting. Calf and thigh are soft. No palpable cords good pedal pulses   Skin:     General: Skin is warm and dry. Neurological:      General: No focal deficit present. Mental Status: She is alert.    Psychiatric:         Mood and Affect: Mood normal.         Behavior: Behavior normal.        MDM  Number of Diagnoses or Management Options  Acute cystitis without hematuria  Rib contusion, left, initial encounter  Right leg pain  Diagnosis management comments: Patient has no history of CHF    Labs Reviewed  CBC WITH AUTO DIFFERENTIAL - Abnormal; Notable for the following components:     RBC                           4.00 (*)               MCH                           34.0 (*)               MPV                           9.2 (*)             All other components within normal limits  COMPREHENSIVE METABOLIC PANEL - Abnormal; Notable for the following components:     Potassium                     3.4 (*)             All other components within normal limits  BRAIN NATRIURETIC PEPTIDE     Vascular duplex lower extremity venous right   Final Result     XR RIBS LEFT INCLUDE CHEST (MIN 3 VIEWS)   Final Result    No displaced rib fractures are appreciated. Urine with small leuks will treat    Patient without worsening DVT, no rib fractures or pneumo. Patient has multiple pain medicines at home that should accommodate her pain. She is to continue taking all at home medicines as prescribed May alternate ice and heat to all sore areas.   See her primary care physician for routine recheck       Amount and/or Complexity of Data Reviewed  Clinical lab tests: ordered and reviewed  Tests in the radiology section of CPT®: ordered and reviewed  Review and summarize past medical records: yes    Risk of Complications, Morbidity, and/or Mortality  Presenting problems: moderate  Diagnostic procedures: moderate  Management options: low    Patient Progress  Patient progress: improved      Procedures    Labs Reviewed   CBC WITH AUTO DIFFERENTIAL - Abnormal; Notable for the following components:       Result Value    RBC 4.00 (*)     MCH 34.0 (*)     MPV 9.2 (*)     All other components within normal limits   COMPREHENSIVE METABOLIC PANEL - Abnormal; Notable for the following components:    Potassium 3.4 (*)     All other components within normal limits   POCT URINALYSIS DIPSTICK - Abnormal; Notable for the following components:    Blood, UA POC SMALL (*)     Leukocyte Est, UA POC MODERATE (*)     All other components within normal limits   BRAIN NATRIURETIC PEPTIDE URINALYSIS        Vascular duplex lower extremity venous right   Final Result      XR RIBS LEFT INCLUDE CHEST (MIN 3 VIEWS)   Final Result   No displaced rib fractures are appreciated. Ally Coma Scale  Eye Opening: Spontaneous  Best Verbal Response: Oriented  Best Motor Response: Obeys commands  Ros[place] Coma Scale Score: 15                     Voice dictation software was used during the making of this note. This software is not perfect and grammatical and other typographical errors may be present. This note has not been completely proofread for errors.      JILLIAN Rivera  11/16/22 [address]  11/16/22 2001       Jc Rivera  11/16/22 2004

## 2022-11-16 NOTE — ED TRIAGE NOTES
Patient arrives to ED via EMS from home. Patient complains of right leg pain. Patient is concerned she has a blood clot. Leg is swollen and red. Patient is on blood thinners.

## 2022-11-18 RX ORDER — LEVOTHYROXINE SODIUM 50 MCG
50 TABLET ORAL DAILY
Qty: 30 TABLET | Refills: 5 | Status: SHIPPED | OUTPATIENT
Start: 2022-11-18

## 2022-11-18 RX ORDER — CETIRIZINE HYDROCHLORIDE 10 MG/1
10 TABLET ORAL DAILY
Qty: 30 TABLET | Refills: 5 | Status: SHIPPED | OUTPATIENT
Start: 2022-11-18

## 2022-11-28 RX ORDER — POLYETHYLENE GLYCOL 3350 17 G/17G
POWDER, FOR SOLUTION ORAL
Qty: 510 G | Refills: 2 | OUTPATIENT
Start: 2022-11-28

## 2022-11-28 RX ORDER — CHOLECALCIFEROL (VITAMIN D3) 125 MCG
CAPSULE ORAL
Qty: 300 TABLET | Refills: 0 | OUTPATIENT
Start: 2022-11-28

## 2022-11-30 ENCOUNTER — TELEPHONE (OUTPATIENT)
Dept: FAMILY MEDICINE CLINIC | Facility: CLINIC | Age: 62
End: 2022-11-30

## 2022-11-30 RX ORDER — POLYETHYLENE GLYCOL 3350 17 G/17G
17 POWDER, FOR SOLUTION ORAL DAILY PRN
Qty: 510 G | Refills: 2 | Status: SHIPPED | OUTPATIENT
Start: 2022-11-30

## 2022-11-30 NOTE — TELEPHONE ENCOUNTER
Patient's mother came to the office stating patient has been to the hospital  (originally for a possible DVT but found out she also had a UTI) that the antibiotics made her sick but she still took them. She also went to urgent care per mother. She is c/o low grade fever, vomiting, out of breath, etc. Can someone please triage and tell patient what to do, and give her a call this afternoon, mother was very insistent.

## 2022-11-30 NOTE — TELEPHONE ENCOUNTER
Patient stated she is having some nausea and diarrhea. She took her last antibiotic yesterday. Advised it could be form the antibiotic and she needed to give it a few days to get out of her system. Stated she had a low grade fever. Advised she can take tylenol for that.  Patient verbalized understanding

## 2022-12-01 ENCOUNTER — TELEPHONE (OUTPATIENT)
Dept: FAMILY MEDICINE CLINIC | Facility: CLINIC | Age: 62
End: 2022-12-01

## 2022-12-01 NOTE — TELEPHONE ENCOUNTER
Patient states she is still having nausea, fever,vomiting after eating & diarrhea. Patient states Urgent Care stated she did not have a UTI. Not taken anymore antibiotics since yesterday. Recommend patient try OTC Imodium & Dramamine & bland diet. If no improvement in symptoms patient should go to ER for evaluation.  Patient understands

## 2022-12-05 RX ORDER — FLUTICASONE FUROATE AND VILANTEROL 200; 25 UG/1; UG/1
1 POWDER RESPIRATORY (INHALATION) DAILY
Qty: 1 EACH | Refills: 11 | OUTPATIENT
Start: 2022-12-05

## 2022-12-05 NOTE — TELEPHONE ENCOUNTER
Juan Vargas was approved in Nov and valid until 2023. Patient just needs to call the pharmacy for a refill. See my note from 22. Claudia 11, per pharmacist her prescription has  and she needs a refill. She states that they didn't get the e-script that was sent in on 22. New script has been verbally authorized with 11 refills. Dr. Breezy Kessler will be informed.

## 2022-12-05 NOTE — TELEPHONE ENCOUNTER
Alisa Manzo, patient left message on refill line stating Breo needs PA, script at [facility], please follow up thanks Wade [place]

## 2022-12-07 ENCOUNTER — TELEPHONE (OUTPATIENT)
Dept: PULMONOLOGY | Age: 62
End: 2022-12-07

## 2022-12-07 NOTE — TELEPHONE ENCOUNTER
Patient needs refills for these 3 medication      Disp Refills Start End    fluticasone furoate-vilanterol (BREO ELLIPTA) 200-25 MCG/ACT AEPB inhaler        This needs a prior authorization           Disp Refills Start End    rivaroxaban (XARELTO) 20 MG TABS tablet        May need a prior authorization         Disp Refills Start End    loratadine (CLARITIN) 10 MG tablet

## 2022-12-07 NOTE — TELEPHONE ENCOUNTER
Patient went to hospital had a UTI . Took her antibiotics. She went to Urgent Care . She redone the urine test, it was negative . Now she coughing, low grade fever. Patient is short of breathe .

## 2022-12-08 NOTE — TELEPHONE ENCOUNTER
LOV 9/27/22 with Iman---COPD,     Allergies: Cipro, codeine, Keflex    Pt c/o cough, congestion, and SOB. States her mucus is a yellow color. No other sx, UTI was treated with Cipro to which she had an anaphylactic reaction to and was treated by urgent care. Since then she has had the respiratory sx and would like to know what to do. RN encouraged pt to get FLU tested, states that she is neg for COVID via home test. Pt states that she will get FLU tested and call back with results. Aware that MD is working in the hospital today, but would still like his input.     Ger Cade, BSN, RN

## 2022-12-08 NOTE — TELEPHONE ENCOUNTER
PA for Xarelto [facility] via CoverMyMeds. Key: [id number]    PA has been approved and valid from 12/13/22-12/13/23    PA for Loratadine sent to [facility] via CoverMyMeds. Key: [id number]    PA has been denied due to the fact that she is trying to get a similar medication Cetirizine. Lan Mejia has already been approved and valid until 11/15/2023, she just needs to call the pharmacy for a refill.

## 2022-12-09 NOTE — TELEPHONE ENCOUNTER
If viral tests are negative would give a course of doxy and 5 days of pred 20mg.      Alvaro Velasco MD

## 2022-12-19 RX ORDER — BUPROPION HYDROCHLORIDE 300 MG/1
TABLET ORAL
Qty: 90 TABLET | Refills: 1 | OUTPATIENT
Start: 2022-12-19

## 2022-12-20 NOTE — TELEPHONE ENCOUNTER
Patient states she needs abx. TRIAGE CALL      Complaint: sick  Cough: yes  Productive:  yes  Bloody Sputum:  no  Increased SOB/Wheezing:  yes  Duration: 1 months  Fever/Chills: yes  OTC Meds tried:    She states that she does not think she has the flu. Covid home test was negative. She says cough is much worse, but upset stomach part has passed. She is taking maintenance meds, but nothing OTC. Pharmacy is [facility] [address] in [place]. She ws advised it will be tomorrow before she gets a call back. She asked to call in the afternoon.

## 2022-12-21 RX ORDER — PREDNISONE 20 MG/1
20 TABLET ORAL DAILY
Qty: 5 TABLET | Refills: 0 | Status: SHIPPED | OUTPATIENT
Start: 2022-12-21 | End: 2022-12-26

## 2022-12-21 RX ORDER — DOXYCYCLINE HYCLATE 100 MG/1
100 CAPSULE ORAL 2 TIMES DAILY
Qty: 14 CAPSULE | Refills: 0 | Status: SHIPPED | OUTPATIENT
Start: 2022-12-21 | End: 2022-12-28

## 2022-12-21 NOTE — TELEPHONE ENCOUNTER
At this point in time if symptoms are ongoing will go ahead and send in doxy and prednisone. Sent to her listed [address].      Radha Hernandez MD

## 2022-12-21 NOTE — TELEPHONE ENCOUNTER
Pt states her COVID test was neg at home but did not have flu test completed. C/o same sx: wheezing, SOB, coughing with yellow sputum. Would MD like pt to have flu test prior to doxy and prednisone, as recommended last week?

## 2022-12-27 RX ORDER — GABAPENTIN 300 MG/1
CAPSULE ORAL
Qty: 90 CAPSULE | Refills: 5 | OUTPATIENT
Start: 2022-12-27

## 2022-12-27 RX ORDER — BACLOFEN 10 MG/1
TABLET ORAL
Qty: 90 TABLET | Refills: 4 | OUTPATIENT
Start: 2022-12-27

## 2023-01-18 RX ORDER — ASCORBIC ACID 500 MG
TABLET ORAL
Qty: 60 TABLET | Refills: 5 | Status: SHIPPED | OUTPATIENT
Start: 2023-01-18

## 2023-01-18 RX ORDER — LANOLIN ALCOHOL/MO/W.PET/CERES
0.4 CREAM (GRAM) TOPICAL DAILY
Qty: 30 TABLET | Refills: 5 | Status: SHIPPED | OUTPATIENT
Start: 2023-01-18

## 2023-01-18 RX ORDER — BACLOFEN 10 MG/1
10 TABLET ORAL 3 TIMES DAILY
Qty: 90 TABLET | Refills: 5 | Status: SHIPPED | OUTPATIENT
Start: 2023-01-18

## 2023-01-18 RX ORDER — CHLORAL HYDRATE 500 MG
CAPSULE ORAL
Qty: 120 CAPSULE | Refills: 5 | Status: SHIPPED | OUTPATIENT
Start: 2023-01-18

## 2023-01-18 RX ORDER — BUPROPION HYDROCHLORIDE 300 MG/1
300 TABLET ORAL EVERY MORNING
Qty: 30 TABLET | Refills: 5 | Status: SHIPPED | OUTPATIENT
Start: 2023-01-18

## 2023-01-18 RX ORDER — GABAPENTIN 300 MG/1
300 CAPSULE ORAL 3 TIMES DAILY
Qty: 90 CAPSULE | Refills: 5 | Status: SHIPPED | OUTPATIENT
Start: 2023-01-18 | End: 2023-02-17

## 2023-01-18 RX ORDER — OMEPRAZOLE 40 MG/1
40 CAPSULE, DELAYED RELEASE ORAL DAILY
Qty: 30 CAPSULE | Refills: 5 | Status: SHIPPED | OUTPATIENT
Start: 2023-01-18

## 2023-01-23 ENCOUNTER — TELEPHONE (OUTPATIENT)
Dept: PULMONOLOGY | Age: 63
End: 2023-01-23

## 2023-01-23 RX ORDER — OMEPRAZOLE 40 MG/1
CAPSULE, DELAYED RELEASE ORAL
Qty: 30 CAPSULE | Refills: 5 | OUTPATIENT
Start: 2023-01-23

## 2023-01-23 RX ORDER — LANOLIN ALCOHOL/MO/W.PET/CERES
CREAM (GRAM) TOPICAL
Qty: 30 TABLET | Refills: 5 | OUTPATIENT
Start: 2023-01-23

## 2023-01-23 NOTE — TELEPHONE ENCOUNTER
TRIAGE CALL      Complaint: Coughing/SOB  Cough: yes  Productive:  yes  Bloody Sputum:  no  Increased SOB/Wheezing:  yes  Duration: 2 months  Fever/Chills: no  OTC Meds tried: tylenol

## 2023-01-24 ENCOUNTER — TELEPHONE (OUTPATIENT)
Dept: SLEEP MEDICINE | Age: 63
End: 2023-01-24

## 2023-01-24 NOTE — PROGRESS NOTES
She is a 71-year-old female with history of emphysema, tobacco use, factor V Leiden, status post DVT and PE, history of COVID in 2022, bilateral pulmonary nodules previously seen by Dr. Cornelius Carbajal. She is seen as a work in visit today secondary to cough, shortness of breath, wheezing. She developed symptoms of respiratory infection/exacerbation in December, tested negative for flu and COVID, was treated with steroids and antibiotics. She improved while on Rx but has worsened off of Rx. DIAGNOSTICS:    Spirometry 9/2017-normal.  Spirometry 1/2019-technically inadequate study, numerically normal.  CXR 1/29/2022-hazy infiltrates in bilateral lower lung fields. CT of chest 8/2022-areas of faint groundglass density in the periphery of the right lung, possibly scarring related to COVID. Several small bilateral nodules, largest is 7 mm in lateral right lung base. Mild emphysema. Follow-up CT recommended in 3 to 6 months.

## 2023-01-24 NOTE — TELEPHONE ENCOUNTER
Pt called refill line stating that she called this line because she has tried calling the Pulmonary clinic numerous times and cannot get through. States she is feeling very ill, she has finished the antibiotics Dr Reyna Jackson prescribed. She states she needs someone to call her.

## 2023-01-24 NOTE — TELEPHONE ENCOUNTER
Contacted patient and advised of need for office appt, no fever in the last 24 hours; worked in for tomorrow afternoon w/ NP.

## 2023-01-24 NOTE — TELEPHONE ENCOUNTER
[facility] reporting same/similar symptoms and was advised to make office appt or be seen at urgent care.

## 2023-01-24 NOTE — TELEPHONE ENCOUNTER
January 24, 2023  9:54 AM  Pt called refill line stating that she called this line because she has tried calling the Pulmonary clinic numerous times and cannot get through. States she is feeling very ill, she has finished the antibiotics Dr Radha Villasenor prescribed. She states she needs someone to call her.

## 2023-01-25 ENCOUNTER — OFFICE VISIT (OUTPATIENT)
Dept: PULMONOLOGY | Age: 63
End: 2023-01-25
Payer: MEDICAID

## 2023-01-25 VITALS
RESPIRATION RATE: 14 BRPM | BODY MASS INDEX: 26.31 KG/M2 | HEART RATE: 87 BPM | OXYGEN SATURATION: 94 % | SYSTOLIC BLOOD PRESSURE: 132 MMHG | DIASTOLIC BLOOD PRESSURE: 77 MMHG | TEMPERATURE: 98 F | HEIGHT: 62 IN | WEIGHT: 143 LBS

## 2023-01-25 DIAGNOSIS — Z72.89 CURRENT VAPING ON SOME DAYS: ICD-10-CM

## 2023-01-25 DIAGNOSIS — Z87.891 PERSONAL HISTORY OF TOBACCO USE, PRESENTING HAZARDS TO HEALTH: ICD-10-CM

## 2023-01-25 DIAGNOSIS — J44.9 CHRONIC OBSTRUCTIVE PULMONARY DISEASE, UNSPECIFIED COPD TYPE (HCC): Primary | ICD-10-CM

## 2023-01-25 DIAGNOSIS — G47.34 NOCTURNAL HYPOXIA: ICD-10-CM

## 2023-01-25 DIAGNOSIS — R91.1 LUNG NODULE: ICD-10-CM

## 2023-01-25 DIAGNOSIS — J44.1 CHRONIC OBSTRUCTIVE PULMONARY DISEASE WITH (ACUTE) EXACERBATION (HCC): Primary | ICD-10-CM

## 2023-01-25 PROCEDURE — 3078F DIAST BP <80 MM HG: CPT | Performed by: NURSE PRACTITIONER

## 2023-01-25 PROCEDURE — 99214 OFFICE O/P EST MOD 30 MIN: CPT | Performed by: NURSE PRACTITIONER

## 2023-01-25 PROCEDURE — 3075F SYST BP GE 130 - 139MM HG: CPT | Performed by: NURSE PRACTITIONER

## 2023-01-25 RX ORDER — OXYCODONE HYDROCHLORIDE 10 MG/1
TABLET ORAL EVERY 8 HOURS PRN
COMMUNITY
Start: 2023-01-20

## 2023-01-25 RX ORDER — DEXAMETHASONE SODIUM PHOSPHATE 10 MG/ML
8 INJECTION INTRAMUSCULAR; INTRAVENOUS ONCE
Status: COMPLETED | OUTPATIENT
Start: 2023-01-25 | End: 2023-01-25

## 2023-01-25 RX ORDER — AZITHROMYCIN 250 MG/1
250 TABLET, FILM COATED ORAL DAILY
Qty: 10 TABLET | Refills: 0 | Status: SHIPPED | OUTPATIENT
Start: 2023-01-25 | End: 2023-02-04

## 2023-01-25 RX ORDER — PREDNISONE 1 MG/1
TABLET ORAL
Qty: 30 TABLET | Refills: 1 | Status: SHIPPED | OUTPATIENT
Start: 2023-01-25

## 2023-01-25 RX ORDER — IPRATROPIUM BROMIDE AND ALBUTEROL SULFATE 2.5; .5 MG/3ML; MG/3ML
SOLUTION RESPIRATORY (INHALATION)
Qty: 360 ML | Refills: 1 | Status: SHIPPED | OUTPATIENT
Start: 2023-01-25

## 2023-01-25 RX ORDER — PREDNISONE 20 MG/1
TABLET ORAL
Qty: 15 TABLET | Refills: 0 | Status: SHIPPED | OUTPATIENT
Start: 2023-01-25

## 2023-01-25 RX ADMIN — DEXAMETHASONE SODIUM PHOSPHATE 8 MG: 10 INJECTION INTRAMUSCULAR; INTRAVENOUS at 16:19

## 2023-01-25 ASSESSMENT — ENCOUNTER SYMPTOMS
SHORTNESS OF BREATH: 1
WHEEZING: 1
COUGH: 1
SPUTUM PRODUCTION: 1

## 2023-01-25 NOTE — PATIENT INSTRUCTIONS
Dexamethasone 8 mg IM was administered. She will begin prednisone taper tomorrow. After completing taper, she will resume prednisone 5 mg every morning after morning meal until directed otherwise. Resume DuoNeb via nebulizer 4 times daily if needed for shortness of breath or wheezing. Continue Breo 1 inhalation every morning, rinse mouth after use. May use albuterol inhaler, 2 puffs 4 times daily if needed for shortness of breath or wheezing and when away from home/nebulizer. Advised to refrain from vaping. She will reschedule complete pulmonary function tests within the next 3 to 4 weeks. Chest CT as scheduled in February, follow-up appointment with Dr. Manpreet Armenta as scheduled.

## 2023-01-25 NOTE — PROGRESS NOTES
[address], [address] [facility]  [phone]    Patient Name:  Sintia Pa    YOB: 1960    Office Visit 1/25/2023      CHIEF COMPLAINT:      Chief Complaint   Patient presents with    Cough    Shortness of Breath    Wheezing         ASSESSMENT:   (Medical Decision Making)                                                                                                                                          Encounter Diagnoses   Name Primary? Chronic obstructive pulmonary disease with (acute) exacerbation (HCC) Yes    Lung nodule     Nocturnal hypoxia     Personal history of tobacco use, presenting hazards to health     Current vaping on some days      Patient with COPD/emphysema, small pulmonary nodules, history of tobacco use, admits to vaping intermittently, experiencing cough, shortness of breath, wheezing. Compliant with maintenance inhaler. States that she is out of Rx for nebulizer. We will treat for exacerbation. Chest CT has been rescheduled for February for follow-up of lung nodules. Patient canceled CPFT's that were ordered by Dr. Deloris Estrella in September, she was to continue prednisone 5 mg daily until findings and recommendations from CPFT's were known. Recently completed prednisone, antibiotics with interval improvement. Reports having issues with DME company regarding nocturnal oxygen. She reports that this is related to billing. I have encouraged her to contact DME company to settle this issue. Ambulatory oximetry was performed during today's visit and demonstrates adequate saturation on room air. She has multiple questions related to nonpulmonary concerns today, I have recommended to be seen by her primary doctor or urgent care if necessary. PLAN:     Dexamethasone 8 mg IM was administered. She will begin prednisone taper tomorrow.       After completing taper, she will resume prednisone 5 mg every morning after morning meal until directed otherwise in follow up with Dr Pablo Amezcua (as per his last recommendations)    Resume DuoNeb via nebulizer 4 times daily if needed for shortness of breath or wheezing. Continue Breo 1 inhalation every morning, rinse mouth after use. May use albuterol inhaler, 2 puffs 4 times daily if needed for shortness of breath or wheezing and when away from home/nebulizer. Advised to refrain from vaping. She will reschedule complete pulmonary function tests within the next 3 to 4 weeks. Chest CT as scheduled in February, follow-up appointment with Dr. Pablo Amezcua as scheduled. Orders Placed This Encounter    OXYGEN     Sig: Inhale into the lungs qhs    oxyCODONE HCl (OXY-IR) 10 MG immediate release tablet     Sig: every 8 hours as needed. dexamethasone (DECADRON) injection 8 mg    predniSONE (DELTASONE) 20 MG tablet     Si po q am pc for 3 days, 1 and 1/2 for 3 days, 1 for 3 days, 1/2 for 3 days, then stop or as directed. Dispense:  15 tablet     Refill:  0    predniSONE (DELTASONE) 5 MG tablet     Si po q am pc     Dispense:  30 tablet     Refill:  1     (Begin 5mg dose after completing prednisone taper). ipratropium-albuterol (DUONEB) 0.5-2.5 (3) MG/3ML SOLN nebulizer solution     Si vial via nebulizer 4 times daily as needed for shortness of breath or wheezing     Dispense:  360 mL     Refill:  1    azithromycin (ZITHROMAX Z-LYNETTE) 250 MG tablet     Sig: Take 1 tablet by mouth daily for 10 days     Dispense:  10 tablet     Refill:  0           BRIDGETTE LEY - [place]    Total  time spent with patient - 48 min. Collaborating MD: Dr. Loco Ree:    She is a 81-year-old female with history of emphysema, tobacco use, factor V Leiden, status post DVT and PE, history of COVID in , bilateral pulmonary nodules previously seen by Dr. Pablo Amezcua.      She is seen as a work in visit today secondary to cough, shortness of breath, wheezing. She developed symptoms of respiratory infection/exacerbation in December, tested negative for flu and COVID, was treated with steroids and antibiotics. She improved while on Rx but has worsened off of Rx. Today, she reports cough productive of yellow sputum, shortness of breath above baseline and wheezing. No hemoptysis. She reports compliance with Breo. She states that nebulizer Rx is old and she did not have refills. She has albuterol inhaler with her and has been using it intermittently with limited response. Using oxygen with sleep but reports intermittent issues with DME provider, also noted in her last visit with Dr. Cindy Brambila. She was to continue prednisone 5 mg daily, have CPFT's and repeat CT of chest in November with decision regarding further tapering prednisone after the studies. For unclear reasons, she states that she has been off of 5 mg prednisone after she completed the tapering dose in December. States that she is not smoking but admits that she is vaping intermittently. History is difficult as she reverts to illnesses in past several months, R sided lower abdominal discomfort, LE swelling. She has lower extremity ultrasound in ER which was negative for DVT. BNP was normal.  She reports compliance with Xarelto which is prescribed for factor V Leiden, history of DVT and PE.     DIAGNOSTICS:     Spirometry 9/2017-normal.  Spirometry 1/2019-technically inadequate study, numerically normal.  Prior CPFT's demonstrated normal spirometry although flow volume loop has a contour borderline for mild obstruction. Lung volumes suggest mild hyperinflation and air trapping, diffusion capacity mildly decreased. CXR 1/29/2022-hazy infiltrates in bilateral lower lung fields. CT of chest 8/2022-areas of faint groundglass density in the periphery of the right lung, possibly scarring related to COVID.   Several small bilateral nodules, largest is 7 mm in lateral right lung base. Mild emphysema. Follow-up CT recommended in 3 to 6 months.  _____________________________________________________________      REVIEW OF SYSTEMS:    Review of Systems   Constitutional: Negative for fever. Respiratory:  Positive for cough, shortness of breath, sputum production and wheezing. PHYSICAL EXAM:    Vitals:    01/25/23 1508   Resp: 14   Weight: 143 lb (64.9 kg)   Height: 5' 2\" (1.575 m)        Body mass index is 26.16 kg/m². GENERAL APPEARANCE:  The patient is normal weight and in no respiratory distress. HEENT:  PERRL. Conjunctivae unremarkable. NECK/LYMPHATIC:   Symmetrical with no elevation of jugular venous pulsation. Trachea midline. LUNGS:   Normal respiratory effort with symmetrical lung expansion. Breath sounds - decreased, + wheezing, no rhonchi or crackles. Intermittent nonproductive cough during visit    HEART:     There is a normal rate and regular rhythm. No murmur, rub, or gallop. There is 1+ edema in the lower extremities. NEURO:  The patient is alert and oriented to person, place, and time. Memory appears intact and mood is anxious  No gross sensorimotor deficits are present. DIAGNOSTIC TESTS: Studies were personally reviewed by me and discussed with the patient. Ambulatory oximetry 1/25/2023-baseline saturation 98% room air at rest, transient drop to 91% room air with ambulation but improved to 94% with ambulation. Baseline heart rate 98, maximum 112 with ambulation. CT WITHOUT CONTRAST:    CT CHEST WO CONTRAST 04/06/2022      COMPARISON: 10/25/2019    FINDINGS:  - LUNGS: There are few areas of faint groundglass density in the periphery of  the right lung base, probably scarring associated with Covid. There are several  small bilateral pulmonary nodules. Largest is an oval 7 mm well-defined nodule  in the lateral right lung base. There is mild emphysema.   - MEDIASTINUM/AXILLA: No significant adenopathy.    - HEART/VESSELS: Normal.  - CHEST WALL/BONES: Normal.  - UPPER ABDOMEN: No acute findings. Impression  1. Several small well-defined pulmonary nodules which are probably benign, but  do appear new compared to the prior chest CT. Follow-up CT in 3-6 months is  recommended. 2.  Mild emphysema. CXR 11/16/2022-ER:    Right lower extremity ultrasound 11/16/2022. Past Medical History:   Diagnosis Date    Anemia     Arrhythmia     Diagnosed 2010, patient believes now resolved. Patient has no cardiologist    Arthritis     Osteoarthritis diagnosed 2467-3324    Asthma     Diagnosed- can't recall. Carpal tunnel syndrome     left    Chronic obstructive pulmonary disease (HCC)     Chronic pain     diagnosed 1994 severe fibromyalgia and 2001 infarcts pleurisy from PE.    DDD (degenerative disc disease), cervical     Depression     Diagnosed 2002     Dysphagia     Facial abscess     Factor 5 Leiden mutation, heterozygous (HCC)     Fibromyalgia     Gait instability     GERD (gastroesophageal reflux disease)     Diagnosed 2001. Hypercholesterolemia     Diagnosed 2011. Hypertension     Diagnose 2001     Lung nodule     Multiple falls     Other ill-defined conditions(799.89)     IBS< Fibra    PTSD (post-traumatic stress disorder)     Scoliosis     Thromboembolus ([address]     Diagnosed 2001 DVT    Thyroid disease     hypothryroidism diagnosed approx 2013. Trauma     PTSD diagnosed 1999.        Patient Active Problem List   Diagnosis    Chronic pain syndrome    Acquired hypothyroidism    History of pulmonary embolism    Mixed hyperlipidemia    Former smoker    Irritable bowel syndrome with constipation    Gastroesophageal reflux disease    Anxiety    History of DVT (deep vein thrombosis)    TMJ syndrome    Depression    Fibromyalgia    History of fracture of wrist    Dry mouth    Dry eyes    Benign essential HTN    Thrush    Factor V Leiden mutation ([address]    Hypercoagulable state ([address]    Cough    Chronic obstructive pulmonary disease ([facility]    Osteoarthritis of multiple joints    Chronic constipation    Right leg pain       Past Surgical History:   Procedure Laterality Date    COLONOSCOPY N/A 4/15/2019    COLONOSCOPY BMI 30 performed by Greta Rodriguez MD at [facility] ENDOSCOPY    GYN      2 D&C due to miscarriages. Year  and .     HEENT      3 lumps removed from neck    ORTHOPEDIC SURGERY Left 2018    carpal tunnel x 2    ORTHOPEDIC SURGERY Right     carpal tunel    ORTHOPEDIC SURGERY Right     trigger fingers    ORTHOPEDIC SURGERY Right 2013    transfer tendon         Social History     Socioeconomic History    Marital status:    Tobacco Use    Smoking status: Former     Packs/day: 1.00     Years: 45.00     Pack years: 45.00     Types: Cigarettes     Quit date: 2016     Years since quittin.4    Smokeless tobacco: Never    Tobacco comments:     Currently Vape   Substance and Sexual Activity    Alcohol use: No     Alcohol/week: 0.0 standard drinks    Drug use: No       Family History   Problem Relation Age of Onset    No Known Problems Father     No Known Problems Paternal Grandfather     No Known Problems Paternal Grandmother     No Known Problems Brother     Clotting Disorder Mother     Cancer Maternal Grandfather     Heart Disease Maternal Grandfather     Clotting Disorder Maternal Grandfather     Cancer Maternal Grandmother     Cancer Maternal Uncle     Clotting Disorder Maternal Aunt        Allergies   Allergen Reactions    Ciprofloxacin Anaphylaxis    Codeine Other (See Comments)     syncopal    Keflex [Cephalexin] Itching       Current Outpatient Medications   Medication Sig    baclofen (LIORESAL) 10 MG tablet Take 1 tablet by mouth 3 times daily    buPROPion (WELLBUTRIN XL) 300 MG extended release tablet Take 1 tablet by mouth every morning    folic acid (FOLVITE) 516 MCG tablet Take 1 tablet by mouth daily    Omega-3 Fatty Acids (FISH OIL) 1000 MG capsule Take 2 capsules by mouth twice daily    ascorbic acid (VITAMIN C) 500 MG tablet 2 tabs daily    omeprazole (PRILOSEC) 40 MG delayed release capsule Take 1 capsule by mouth daily    gabapentin (NEURONTIN) 300 MG capsule Take 1 capsule by mouth 3 times daily for 30 days. fluticasone furoate-vilanterol (BREO ELLIPTA) 200-25 MCG/ACT AEPB inhaler Inhale 1 puff into the lungs daily    polyethylene glycol (GLYCOLAX) 17 GM/SCOOP powder Take 17 g by mouth daily as needed (constipation)    SYNTHROID 50 MCG tablet Take 1 tablet by mouth Daily    cetirizine (ZYRTEC) 10 MG tablet Take 1 tablet by mouth daily    omega-3 acid ethyl esters (LOVAZA) 1 g capsule TAKE 2 CAPS BY MOUTH TWO TIMES A DAY    albuterol sulfate HFA (VENTOLIN HFA) 108 (90 Base) MCG/ACT inhaler Inhale 2 puffs into the lungs every 4 hours as needed for Wheezing INHALE 2 PUFFS BY MOUTH EVERY 4 HOURS AS NEEDED FOR WHEEZING    Fluticasone furoate-vilanterol (BREO ELLIPTA) 200-25 MCG/INH AEPB inhaler Inhale 1 puff into the lungs daily    ipratropium-albuterol (DUONEB) 0.5-2.5 (3) MG/3ML SOLN nebulizer solution Take 3 mLs by nebulization every 4 hours as needed for Shortness of Breath    cetirizine (ZYRTEC) 10 MG tablet Take 1 tablet by mouth daily    guaiFENesin (MUCINEX) 600 MG extended release tablet Take 1 tablet by mouth 2 times daily    rivaroxaban (XARELTO) 20 MG TABS tablet Take 1 tablet by mouth daily TAKE 1 TABLET BY MOUTH ONCE DAILY    potassium chloride (KLOR-CON M) 20 MEQ extended release tablet 2 twice daily    predniSONE (DELTASONE) 5 MG tablet Take 1 tablet by mouth daily (with breakfast)    amLODIPine (NORVASC) 5 MG tablet Take 1 tablet by mouth in the morning. atorvastatin (LIPITOR) 20 MG tablet Take 1 tablet by mouth in the morning. DULoxetine (CYMBALTA) 60 MG extended release capsule Take 1 capsule by mouth in the morning. hydroCHLOROthiazide (HYDRODIURIL) 25 MG tablet Take 1 tablet by mouth in the morning.     fluticasone (FLONASE) 50 MCG/ACT nasal spray USE 2 SPRAY(S) IN EACH NOSTRIL ONCE DAILY AS NEEDED    CALCIUM PO Take by mouth    ALPRAZolam (XANAX) 1 MG tablet Take 1 mg by mouth 3 times daily as needed. Biotin 2.5 MG CAPS Take 1 tablet by mouth daily    guaiFENesin (MUCINEX) 600 MG extended release tablet Take 2 tablets by mouth twice daily    ipratropium (ATROVENT) 0.03 % nasal spray USE 2 SPRAY(S) IN EACH NOSTRIL TWICE DAILY    ketotifen (ZADITOR) 0.025 % ophthalmic solution INSTILL 1 DROPS INTO EACH EYE TWICE DAILY FOR 10 DAYS    loratadine (CLARITIN) 10 MG tablet Take 10 mg by mouth daily    lubiprostone (AMITIZA) 24 MCG capsule TAKE 1 CAPSULE BY MOUTH TWICE DAILY WITH MEALS    morphine (JAMSHID) 20 MG extended release capsule Take 20 mg by mouth every 24 hours. traMADol (ULTRAM) 50 MG tablet Take 50 mg by mouth 3 times daily as needed. ergocalciferol (ERGOCALCIFEROL) 1.25 MG (12669 UT) capsule Take 1 capsule by mouth once a week Take 1 capsule by mouth once a week (Patient not taking: Reported on 1/25/2023)    benzonatate (TESSALON) 200 MG capsule Take 1 capsule by mouth 3 times daily as needed for Cough (Patient not taking: Reported on 1/25/2023)    Cyanocobalamin ER 1000 MCG TBCR Take 1 tablet by mouth daily (Patient not taking: Reported on 1/25/2023)    Cholecalciferol 50 MCG (2000 UT) CAPS Take 2,000 Units by mouth daily (Patient not taking: Reported on 1/25/2023)    benzonatate (TESSALON) 200 MG capsule Take 1 capsule by mouth three times daily as needed for cough (Patient not taking: Reported on 1/25/2023)    famotidine (PEPCID) 20 MG tablet Take 40 mg by mouth daily (Patient not taking: Reported on 1/25/2023)    olopatadine (PATANASE) 0.6 % SOLN nassl soln by Nasal route     No current facility-administered medications for this visit.        Over 50% of today's office visit was spent in face to face time reviewing test results, prognosis, importance of compliance, education about disease process, benefits of medications, instructions for management of acute symptoms, and follow up plans. Electronically signed. Dictated using voice recognition software.   Proof read but unrecognized errors may exist.

## 2023-01-27 RX ORDER — DULOXETIN HYDROCHLORIDE 60 MG/1
60 CAPSULE, DELAYED RELEASE ORAL DAILY
Qty: 30 CAPSULE | Refills: 5 | Status: SHIPPED | OUTPATIENT
Start: 2023-01-27

## 2023-01-27 RX ORDER — AMLODIPINE BESYLATE 5 MG/1
5 TABLET ORAL DAILY
Qty: 30 TABLET | Refills: 5 | Status: SHIPPED | OUTPATIENT
Start: 2023-01-27

## 2023-01-27 RX ORDER — HYDROCHLOROTHIAZIDE 25 MG/1
25 TABLET ORAL DAILY
Qty: 30 TABLET | Refills: 5 | Status: SHIPPED | OUTPATIENT
Start: 2023-01-27

## 2023-01-27 RX ORDER — ATORVASTATIN CALCIUM 20 MG/1
20 TABLET, FILM COATED ORAL DAILY
Qty: 30 TABLET | Refills: 5 | Status: SHIPPED | OUTPATIENT
Start: 2023-01-27

## 2023-01-27 RX ORDER — BACLOFEN 10 MG/1
10 TABLET ORAL 3 TIMES DAILY
Qty: 90 TABLET | Refills: 1 | Status: SHIPPED | OUTPATIENT
Start: 2023-01-27

## 2023-01-27 RX ORDER — GABAPENTIN 300 MG/1
300 CAPSULE ORAL 3 TIMES DAILY
Qty: 90 CAPSULE | Refills: 5 | Status: SHIPPED | OUTPATIENT
Start: 2023-01-27 | End: 2023-02-26

## 2023-01-27 RX ORDER — ASCORBIC ACID 500 MG
TABLET ORAL
Qty: 60 TABLET | Refills: 5 | Status: SHIPPED | OUTPATIENT
Start: 2023-01-27

## 2023-01-27 RX ORDER — BUPROPION HYDROCHLORIDE 300 MG/1
300 TABLET ORAL EVERY MORNING
Qty: 30 TABLET | Refills: 5 | Status: SHIPPED | OUTPATIENT
Start: 2023-01-27

## 2023-01-27 RX ORDER — OMEPRAZOLE 40 MG/1
40 CAPSULE, DELAYED RELEASE ORAL DAILY
Qty: 30 CAPSULE | Refills: 5 | Status: SHIPPED | OUTPATIENT
Start: 2023-01-27

## 2023-01-27 RX ORDER — LANOLIN ALCOHOL/MO/W.PET/CERES
0.4 CREAM (GRAM) TOPICAL DAILY
Qty: 30 TABLET | Refills: 5 | Status: SHIPPED | OUTPATIENT
Start: 2023-01-27

## 2023-01-27 NOTE — TELEPHONE ENCOUNTER
----- Message from Wicho Pettit LPN sent at 1/74/1979 12:01 PM EST -----  Subject: Refill Request    QUESTIONS  Name of Medication? folic acid (FOLVITE) 014 MCG tablet  Patient-reported dosage and instructions? 1 daily  How many days do you have left? 4  Preferred Pharmacy? [facility]  Pharmacy phone number (if available)? [phone]  ---------------------------------------------------------------------------  --------------,  Name of Medication? omeprazole (PRILOSEC) 40 MG delayed release capsule  Patient-reported dosage and instructions? 1 daily  How many days do you have left? 4  Preferred Pharmacy? [facility]  Pharmacy phone number (if available)? [phone]  ---------------------------------------------------------------------------  --------------,  Name of Medication? Omega-3 Fatty Acids (FISH OIL) 1000 MG capsule  Patient-reported dosage and instructions? 2 caps daily  How many days do you have left? 8  Preferred Pharmacy? [facility]  Pharmacy phone number (if available)? [phone]  Additional Information for Provider? States she take 4 of these and needs   a PA completed for this year please let her know that this is take care of     ---------------------------------------------------------------------------  --------------,  Name of Medication? buPROPion (WELLBUTRIN XL) 300 MG extended release   tablet  Patient-reported dosage and instructions? 1 daily  How many days do you have left? 4  Preferred Pharmacy? [facility]  Pharmacy phone number (if available)? [phone]  ---------------------------------------------------------------------------  --------------,  Name of Medication? ascorbic acid (VITAMIN C) 500 MG tablet  Patient-reported dosage and instructions? 1 twice a day  How many days do you have left? 4  Preferred Pharmacy? [facility]  Pharmacy phone number (if available)? [phone]  Additional Information for Provider?  Patient states scripts where sent to   [facility] pharmacy please resend to [address] [facility]  ---------------------------------------------------------------------------  --------------,  Name of Medication? gabapentin (NEURONTIN) 300 MG capsule  Patient-reported dosage and instructions? 1 three times a day   How many days do you have left? 8  Preferred Pharmacy? [facility]  Pharmacy phone number (if available)? [phone]  ---------------------------------------------------------------------------  --------------,  Name of Medication? baclofen (LIORESAL) 10 MG tablet  Patient-reported dosage and instructions? 1 three times a day  How many days do you have left? 8  Preferred Pharmacy? [facility]  Pharmacy phone number (if available)? [phone]  ---------------------------------------------------------------------------  --------------,  Name of Medication? amLODIPine (NORVASC) 5 MG tablet  Patient-reported dosage and instructions? 1 daily  How many days do you have left? 14  Preferred Pharmacy? [facility]  Pharmacy phone number (if available)? [phone]  ---------------------------------------------------------------------------  --------------,  Name of Medication? atorvastatin (LIPITOR) 20 MG tablet  Patient-reported dosage and instructions? 1 daily  How many days do you have left? 4  Preferred Pharmacy? [facility]  Pharmacy phone number (if available)? [phone]  ---------------------------------------------------------------------------  --------------,  Name of Medication? DULoxetine (CYMBALTA) 60 MG extended release capsule  Patient-reported dosage and instructions? 1 daily   How many days do you have left? 4  Preferred Pharmacy? [facility]  Pharmacy phone number (if available)?  [phone]  ---------------------------------------------------------------------------  --------------,  Name of Medication? hydroCHLOROthiazide (HYDRODIURIL) 25 MG tablet  Patient-reported dosage and instructions? 1 daily  How many days do you have left? 15  [facility]  Pharmacy phone number (if available)? [phone]  Additional Information for Provider? the last few are due for feb   ---------------------------------------------------------------------------  --------------  [address]  What is the best way for the office to contact you? OK to leave message on   voicemail  Preferred Call Back Phone Number? [phone]  ---------------------------------------------------------------------------  --------------  SCRIPT ANSWERS  Relationship to Patient?  Self

## 2023-02-27 ENCOUNTER — HOSPITAL ENCOUNTER (OUTPATIENT)
Dept: PULMONOLOGY | Age: 63
Discharge: HOME | End: 2023-03-02
Payer: MEDICAID

## 2023-02-27 ENCOUNTER — HOSPITAL ENCOUNTER (OUTPATIENT)
Dept: CT IMAGING | Age: 63
Discharge: HOME | End: 2023-03-02
Payer: MEDICAID

## 2023-02-27 DIAGNOSIS — R91.1 SOLITARY PULMONARY NODULE: ICD-10-CM

## 2023-02-27 PROCEDURE — 71250 CT THORAX DX C-: CPT

## 2023-03-15 RX ORDER — RIVAROXABAN 20 MG/1
TABLET, FILM COATED ORAL
Qty: 30 TABLET | Refills: 5 | Status: SHIPPED | OUTPATIENT
Start: 2023-03-15

## 2023-03-15 NOTE — TELEPHONE ENCOUNTER
Patient Refill Request     Last Office Visit: 01/25/2023 with Pallavi MEDINA     When they were supposed to follow up: Keep follow up appt     Upcoming Office Visit: 03/31/23 with Dr. Kira Mercer Requested: Xarelto 20 mg     Type of refill: 30 day     [facility]     Is prescription pended?  Yes

## 2023-03-20 RX ORDER — POLYETHYLENE GLYCOL 3350 17 G/17G
POWDER ORAL
Qty: 510 G | Refills: 2 | OUTPATIENT
Start: 2023-03-20

## 2023-03-22 RX ORDER — BACLOFEN 10 MG/1
TABLET ORAL
Qty: 90 TABLET | Refills: 1 | OUTPATIENT
Start: 2023-03-22

## 2023-03-31 ENCOUNTER — HOSPITAL ENCOUNTER (OUTPATIENT)
Dept: GENERAL RADIOLOGY | Age: 63
End: 2023-03-31
Payer: MEDICAID

## 2023-03-31 ENCOUNTER — OFFICE VISIT (OUTPATIENT)
Dept: PULMONOLOGY | Age: 63
End: 2023-03-31

## 2023-03-31 VITALS
SYSTOLIC BLOOD PRESSURE: 120 MMHG | HEART RATE: 96 BPM | WEIGHT: 143 LBS | TEMPERATURE: 98 F | DIASTOLIC BLOOD PRESSURE: 80 MMHG | OXYGEN SATURATION: 98 % | BODY MASS INDEX: 26.31 KG/M2 | HEIGHT: 62 IN | RESPIRATION RATE: 20 BRPM
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
## 2023-03-31 DIAGNOSIS — Z87.891 PERSONAL HISTORY OF TOBACCO USE, PRESENTING HAZARDS TO HEALTH: ICD-10-CM

## 2023-03-31 DIAGNOSIS — R07.81 RIB PAIN: ICD-10-CM

## 2023-03-31 DIAGNOSIS — J44.1 CHRONIC OBSTRUCTIVE PULMONARY DISEASE WITH (ACUTE) EXACERBATION (HCC): ICD-10-CM

## 2023-03-31 DIAGNOSIS — Z86.711 PERSONAL HISTORY OF PULMONARY EMBOLISM: ICD-10-CM

## 2023-03-31 DIAGNOSIS — H93.11 TINNITUS OF RIGHT EAR: ICD-10-CM

## 2023-03-31 DIAGNOSIS — R07.81 RIB PAIN: Primary | ICD-10-CM

## 2023-03-31 DIAGNOSIS — G47.34 NOCTURNAL HYPOXIA: ICD-10-CM

## 2023-03-31 DIAGNOSIS — R91.1 LUNG NODULE: ICD-10-CM

## 2023-03-31 PROCEDURE — 71101 X-RAY EXAM UNILAT RIBS/CHEST: CPT

## 2023-03-31 RX ORDER — POLYETHYLENE GLYCOL 3350 17 G/17G
17 POWDER, FOR SOLUTION ORAL DAILY PRN
Qty: 510 G | Refills: 2 | Status: SHIPPED | OUTPATIENT
Start: 2023-03-31

## 2023-03-31 RX ORDER — BACLOFEN 10 MG/1
10 TABLET ORAL 3 TIMES DAILY
Qty: 90 TABLET | Refills: 1 | Status: SHIPPED | OUTPATIENT
Start: 2023-03-31

## 2023-03-31 RX ORDER — POTASSIUM CHLORIDE 20 MEQ/1
TABLET, EXTENDED RELEASE ORAL
Qty: 120 TABLET | Refills: 5 | Status: SHIPPED | OUTPATIENT
Start: 2023-03-31

## 2023-03-31 NOTE — PROGRESS NOTES
cervical     Depression     Diagnosed      Dysphagia     Facial abscess     Factor 5 Leiden mutation, heterozygous (HCC)     Fibromyalgia     Gait instability     GERD (gastroesophageal reflux disease)     Diagnosed . Hypercholesterolemia     Diagnosed . Hypertension     Diagnose      Lung nodule     Multiple falls     Other ill-defined conditions(799.89)     IBS< Fibra    PTSD (post-traumatic stress disorder)     Scoliosis     Thromboembolus ([address]     Diagnosed  DVT    Thyroid disease     hypothryroidism diagnosed approx . Trauma     PTSD diagnosed .         Tobacco Use      Smoking status: Former        Packs/day: 1.00        Years: 45.00        Pack years: 39        Types: Cigarettes        Quit date: 2016        Years since quittin.5      Smokeless tobacco: Never      Tobacco comments: Currently Vape    Allergies   Allergen Reactions    Ciprofloxacin Anaphylaxis    Codeine Other (See Comments)     syncopal    Keflex [Cephalexin] Itching     Current Outpatient Medications   Medication Instructions    albuterol sulfate HFA (VENTOLIN HFA) 108 (90 Base) MCG/ACT inhaler 2 puffs, Inhalation, EVERY 4 HOURS PRN, INHALE 2 PUFFS BY MOUTH EVERY 4 HOURS AS NEEDED FOR WHEEZING    ALPRAZolam (XANAX) 1 mg, Oral, 3 TIMES DAILY PRN    amLODIPine (NORVASC) 5 mg, Oral, DAILY    ascorbic acid (VITAMIN C) 500 MG tablet 2 tabs daily    atorvastatin (LIPITOR) 20 mg, Oral, DAILY    baclofen (LIORESAL) 10 mg, Oral, 3 TIMES DAILY    benzonatate (TESSALON) 200 MG capsule Take 1 capsule by mouth three times daily as needed for cough    benzonatate (TESSALON) 200 mg, Oral, 3 TIMES DAILY PRN    Biotin 2.5 MG CAPS 1 tablet, Oral, DAILY    buPROPion (WELLBUTRIN XL) 300 mg, Oral, EVERY MORNING    CALCIUM PO Oral    cetirizine (ZYRTEC) 10 mg, Oral, DAILY    cetirizine (ZYRTEC) 10 mg, Oral, DAILY    Cholecalciferol 2,000 Units, DAILY    Cyanocobalamin ER 1,000 mcg, Oral, DAILY    DULoxetine (CYMBALTA)

## 2023-05-15 RX ORDER — CETIRIZINE HYDROCHLORIDE 10 MG/1
TABLET ORAL
Qty: 30 TABLET | Refills: 5 | Status: SHIPPED | OUTPATIENT
Start: 2023-05-15

## 2023-05-16 RX ORDER — PREDNISONE 1 MG/1
TABLET ORAL
Qty: 30 TABLET | Refills: 0 | Status: SHIPPED | OUTPATIENT
Start: 2023-05-16

## 2023-05-16 NOTE — TELEPHONE ENCOUNTER
Patient Refill Request     Last Office Visit: 03/31/23 with Dr. Martínez rCistina     When they were supposed to follow up: 6 months     Upcoming Office Visit: 09/15/2023 with Dr. Luther Evans Requested: Prednisone 5 mg     Type of refill: 30 day     [facility]ed [facility]acy[facility]     Is prescription pended?  Yes

## 2023-05-24 RX ORDER — BACLOFEN 10 MG/1
TABLET ORAL
Qty: 90 TABLET | Refills: 1 | OUTPATIENT
Start: 2023-05-24

## 2023-05-24 RX ORDER — PREDNISONE 1 MG/1
5 TABLET ORAL
Qty: 30 TABLET | Refills: 0 | Status: SHIPPED | OUTPATIENT
Start: 2023-05-24

## 2023-05-24 NOTE — TELEPHONE ENCOUNTER
Patient Refill Request     Last Office Visit: 3/31/23     When they were supposed to follow up:      Upcoming Office Visit: 9/20/23     Refills Requested: predniSONE (DELTASONE) 5 MG tablet        Type of refill:      Requested Pharmacy: [id number] [address] [facility], [address] [place] [address]   [address] [address], [address] [place] [id number]      Is prescription pended?  no

## 2023-05-24 NOTE — TELEPHONE ENCOUNTER
Dr. Rakesh Last, I pended the Prednisone to the patient's preferred pharmacy.   Please sign off if appropriate. // Chitra Mccarty

## 2023-05-31 ENCOUNTER — TELEPHONE (OUTPATIENT)
Dept: PULMONOLOGY | Age: 63
End: 2023-05-31

## 2023-05-31 NOTE — TELEPHONE ENCOUNTER
TRIAGE CALL      Complaint: cough/congestion  Cough: yes  Productive:  yes green  Bloody Sputum:  no  Increased SOB/Wheezing:  yes both  Duration: about a week and half   Fever/Chills: 100.5  OTC Meds tried: musinex Enbrel Counseling:  I discussed with the patient the risks of etanercept including but not limited to myelosuppression, immunosuppression, autoimmune hepatitis, demyelinating diseases, lymphoma, and infections.  The patient understands that monitoring is required including a PPD at baseline and must alert us or the primary physician if symptoms of infection or other concerning signs are noted.

## 2023-06-01 RX ORDER — DOXYCYCLINE HYCLATE 100 MG
100 TABLET ORAL 2 TIMES DAILY
Qty: 14 TABLET | Refills: 0 | Status: SHIPPED | OUTPATIENT
Start: 2023-06-01 | End: 2023-06-02

## 2023-06-01 NOTE — TELEPHONE ENCOUNTER
Last seen: 3/31/23  Hx: emphysema, factor V, h/o DVT/PE, post COVID    Prednisone note: repeat cPFT if stable will drop prednisone from 5 to 2.5mg. Was scheduled for cPFTs 4/13 but canceled appt. Has not had GLENDA done to evaluate for O2 needs. Patient call reporting increased cough & congestion, sputum is green, having sob & wheezing w/ some low grade fever. Reports a sick exposure about 2 weeks ago, other family member required antibiotic; has not tested for flu or COVID; able to use albuterol; asking for something to be called in so that she can do some planned ; confirmed pharmacy on file.

## 2023-06-01 NOTE — TELEPHONE ENCOUNTER
Advised of MD recommendations, electronic Rx. Notes she is having a problem w/ thrush in her mouth and around her lips on the inside. In the past has used magic mouthwash, can that be sent in as well.

## 2023-06-02 ENCOUNTER — TELEPHONE (OUTPATIENT)
Dept: FAMILY MEDICINE CLINIC | Facility: CLINIC | Age: 63
End: 2023-06-02

## 2023-06-02 RX ORDER — BACLOFEN 10 MG/1
10 TABLET ORAL 3 TIMES DAILY
Qty: 90 TABLET | Refills: 1 | Status: SHIPPED | OUTPATIENT
Start: 2023-06-02

## 2023-06-02 RX ORDER — DOXYCYCLINE HYCLATE 100 MG
100 TABLET ORAL 2 TIMES DAILY
Qty: 14 TABLET | Refills: 0 | Status: SHIPPED | OUTPATIENT
Start: 2023-06-02 | End: 2023-06-09

## 2023-06-05 ENCOUNTER — TELEPHONE (OUTPATIENT)
Dept: FAMILY MEDICINE CLINIC | Facility: CLINIC | Age: 63
End: 2023-06-05

## 2023-06-05 ENCOUNTER — TELEPHONE (OUTPATIENT)
Dept: PULMONOLOGY | Age: 63
End: 2023-06-05

## 2023-06-05 NOTE — TELEPHONE ENCOUNTER
Patient said that she is not feeling  any better  with the Doxcycoline.  . She says that she never got the prescription for Magic mouth waher and her mouth is very sore

## 2023-06-06 RX ORDER — AZITHROMYCIN 250 MG/1
250 TABLET, FILM COATED ORAL SEE ADMIN INSTRUCTIONS
Qty: 6 TABLET | Refills: 0 | Status: SHIPPED | OUTPATIENT
Start: 2023-06-06 | End: 2023-06-11

## 2023-06-06 RX ORDER — PREDNISONE 20 MG/1
TABLET ORAL
Qty: 15 TABLET | Refills: 0 | Status: SHIPPED | OUTPATIENT
Start: 2023-06-06

## 2023-06-06 NOTE — TELEPHONE ENCOUNTER
I have called Meka Anna in regard to [facility]. Not able to supply due to backorder on Lidocaine. LOV 3/31/2023 Dr Valerio-COPD, history of former tobacco abuse, COPD vs asthma, PE with factor V Leiden on Xarelto, nocturnal hypoxia, and pulmonary nodules. Spirometry had been normal but CT showed  emphysema    I have spoken with patient. She reports that she is not better on Doxycycline and is not better. She is coughing up green secretions. She reports that stomach upset from Doxy. She has 2 days left of Doxy. She has not done a COVID test.  She does not think this is COVID. She is currently . Temp yesterday 99.5 or so. She reports temp of 100.5 on Thursday of last week. She reports increased wheezing and some SOB. She reports ongoing illness x 2.5 weeks. She reports family members with similar symptoms have been treated with Azithromycin and Prednisone. She is taking Albuterol at least 4 x daily. She is taking Mucinex BID. She is taking Breo. She is taking Prednisone 5 mg daily. I have spoken with Dr Marsha Alvarez who approves Azithromycin and Prednisone taper as requested. These have been sent to [facility]. Patient is aware that [facility] does not have Lidocaine in stock, this is rerouted to [address] [place] [address]. She is aware that if not soon improved, she will need to call office or go to urgent care for MD evaluation and likely CXR.

## 2023-06-21 RX ORDER — POLYETHYLENE GLYCOL 3350 17 G/17G
POWDER ORAL
Qty: 510 G | Refills: 2 | Status: SHIPPED | OUTPATIENT
Start: 2023-06-21

## 2023-06-22 ENCOUNTER — TELEPHONE (OUTPATIENT)
Dept: SLEEP MEDICINE | Age: 63
End: 2023-06-22

## 2023-06-27 RX ORDER — IPRATROPIUM BROMIDE 21 UG/1
1 SPRAY, METERED NASAL 2 TIMES DAILY
Qty: 30 ML | Refills: 1 | Status: SHIPPED | OUTPATIENT
Start: 2023-06-27

## 2023-06-27 RX ORDER — PREDNISONE 5 MG/1
TABLET ORAL
Qty: 30 TABLET | Refills: 1 | Status: SHIPPED | OUTPATIENT
Start: 2023-06-27

## 2023-07-10 ENCOUNTER — TELEPHONE (OUTPATIENT)
Dept: FAMILY MEDICINE CLINIC | Facility: CLINIC | Age: 63
End: 2023-07-10

## 2023-07-10 RX ORDER — POTASSIUM CHLORIDE 750 MG/1
20 TABLET, EXTENDED RELEASE ORAL 2 TIMES DAILY
Qty: 120 TABLET | Refills: 0 | Status: SHIPPED | OUTPATIENT
Start: 2023-07-10

## 2023-07-10 RX ORDER — LEVOTHYROXINE SODIUM 50 MCG
50 TABLET ORAL DAILY
Qty: 30 TABLET | Refills: 0 | Status: SHIPPED | OUTPATIENT
Start: 2023-07-10

## 2023-07-10 RX ORDER — POTASSIUM CHLORIDE 20 MEQ/1
TABLET, EXTENDED RELEASE ORAL
Qty: 120 TABLET | Refills: 5 | Status: CANCELLED | OUTPATIENT
Start: 2023-07-10

## 2023-07-10 NOTE — TELEPHONE ENCOUNTER
Patient needs refill on synthroid, also on Potassium but wants to know if the dose can be changed to 10mg so that the pill will be smaller, she states she has trouble taking the 20mg because of how large it is, even if she cuts it in half.   [facility]armacy is [facility] in [place]

## 2023-07-10 NOTE — TELEPHONE ENCOUNTER
Patient is requesting a referral to [facility],  The phone number is [phone] and the fax number [phone].

## 2023-07-11 ENCOUNTER — TELEPHONE (OUTPATIENT)
Dept: FAMILY MEDICINE CLINIC | Facility: CLINIC | Age: 63
End: 2023-07-11

## 2023-07-11 NOTE — TELEPHONE ENCOUNTER
Patient needs a refill on Gabapentin 300 mg, Prilosec 40 mg, and Vitamin C 500 mg 2X a day. This all needs to be sent to [facility] in [address] [place] [address] on 153.

## 2023-07-12 RX ORDER — GABAPENTIN 300 MG/1
300 CAPSULE ORAL 3 TIMES DAILY
Qty: 90 CAPSULE | Refills: 5 | Status: SHIPPED | OUTPATIENT
Start: 2023-07-12 | End: 2024-01-08

## 2023-07-12 RX ORDER — OMEPRAZOLE 40 MG/1
40 CAPSULE, DELAYED RELEASE ORAL DAILY
Qty: 30 CAPSULE | Refills: 5 | Status: SHIPPED | OUTPATIENT
Start: 2023-07-12

## 2023-07-12 RX ORDER — ASCORBIC ACID 500 MG
TABLET ORAL
Qty: 60 TABLET | Refills: 5 | Status: SHIPPED | OUTPATIENT
Start: 2023-07-12

## 2023-07-20 ENCOUNTER — TELEPHONE (OUTPATIENT)
Dept: FAMILY MEDICINE CLINIC | Facility: CLINIC | Age: 63
End: 2023-07-20

## 2023-07-20 RX ORDER — BUPROPION HYDROCHLORIDE 300 MG/1
300 TABLET ORAL EVERY MORNING
Qty: 30 TABLET | Refills: 5 | Status: SHIPPED | OUTPATIENT
Start: 2023-07-20

## 2023-07-20 RX ORDER — BUPROPION HYDROCHLORIDE 300 MG/1
TABLET ORAL
Qty: 30 TABLET | Refills: 5 | OUTPATIENT
Start: 2023-07-20

## 2023-07-20 RX ORDER — ASCORBIC ACID 500 MG
TABLET ORAL
Qty: 60 TABLET | Refills: 5 | OUTPATIENT
Start: 2023-07-20

## 2023-07-20 RX ORDER — GABAPENTIN 300 MG/1
CAPSULE ORAL
Qty: 90 CAPSULE | Refills: 5 | OUTPATIENT
Start: 2023-07-20

## 2023-07-20 RX ORDER — LANOLIN ALCOHOL/MO/W.PET/CERES
CREAM (GRAM) TOPICAL
Qty: 30 TABLET | Refills: 5 | OUTPATIENT
Start: 2023-07-20

## 2023-07-20 RX ORDER — OMEPRAZOLE 40 MG/1
CAPSULE, DELAYED RELEASE ORAL
Qty: 30 CAPSULE | Refills: 5 | OUTPATIENT
Start: 2023-07-20

## 2023-07-20 RX ORDER — LANOLIN ALCOHOL/MO/W.PET/CERES
0.4 CREAM (GRAM) TOPICAL DAILY
Qty: 30 TABLET | Refills: 5 | Status: SHIPPED | OUTPATIENT
Start: 2023-07-20

## 2023-07-20 NOTE — TELEPHONE ENCOUNTER
Pt calling back stating that the reason for pain management referral is for lumbar \" compressed nerves\" ,scoliosis,degenerative disc disease,stenosis and arthritis in her neck. She left previous pain management because of \" issue with an rx\".

## 2023-07-26 ENCOUNTER — TELEPHONE (OUTPATIENT)
Dept: FAMILY MEDICINE CLINIC | Facility: CLINIC | Age: 63
End: 2023-07-26

## 2023-08-02 RX ORDER — OMEPRAZOLE 40 MG/1
40 CAPSULE, DELAYED RELEASE ORAL DAILY
Qty: 30 CAPSULE | Refills: 5 | OUTPATIENT
Start: 2023-08-02

## 2023-08-02 RX ORDER — GABAPENTIN 300 MG/1
300 CAPSULE ORAL 3 TIMES DAILY
Qty: 90 CAPSULE | Refills: 5 | OUTPATIENT
Start: 2023-08-02 | End: 2024-01-29

## 2023-08-02 RX ORDER — ASCORBIC ACID 500 MG
TABLET ORAL
Qty: 60 TABLET | Refills: 5 | OUTPATIENT
Start: 2023-08-02

## 2023-08-07 ENCOUNTER — OFFICE VISIT (OUTPATIENT)
Dept: FAMILY MEDICINE CLINIC | Facility: CLINIC | Age: 63
End: 2023-08-07
Payer: MEDICAID

## 2023-08-07 VITALS
HEART RATE: 94 BPM | SYSTOLIC BLOOD PRESSURE: 118 MMHG | HEIGHT: 62 IN | DIASTOLIC BLOOD PRESSURE: 62 MMHG | WEIGHT: 149 LBS | OXYGEN SATURATION: 93 % | BODY MASS INDEX: 27.42 KG/M2 | TEMPERATURE: 97 F

## 2023-08-07 DIAGNOSIS — R10.31 RLQ ABDOMINAL PAIN: Primary | ICD-10-CM

## 2023-08-07 DIAGNOSIS — I10 ESSENTIAL (PRIMARY) HYPERTENSION: ICD-10-CM

## 2023-08-07 DIAGNOSIS — Z12.11 COLON CANCER SCREENING: ICD-10-CM

## 2023-08-07 DIAGNOSIS — E03.9 ACQUIRED HYPOTHYROIDISM: ICD-10-CM

## 2023-08-07 DIAGNOSIS — R73.03 PREDIABETES: ICD-10-CM

## 2023-08-07 DIAGNOSIS — F32.A DEPRESSION, UNSPECIFIED DEPRESSION TYPE: ICD-10-CM

## 2023-08-07 DIAGNOSIS — E78.2 MIXED HYPERLIPIDEMIA: ICD-10-CM

## 2023-08-07 PROCEDURE — 99214 OFFICE O/P EST MOD 30 MIN: CPT | Performed by: STUDENT IN AN ORGANIZED HEALTH CARE EDUCATION/TRAINING PROGRAM

## 2023-08-07 PROCEDURE — 3074F SYST BP LT 130 MM HG: CPT | Performed by: STUDENT IN AN ORGANIZED HEALTH CARE EDUCATION/TRAINING PROGRAM

## 2023-08-07 PROCEDURE — 3078F DIAST BP <80 MM HG: CPT | Performed by: STUDENT IN AN ORGANIZED HEALTH CARE EDUCATION/TRAINING PROGRAM

## 2023-08-07 RX ORDER — POLYETHYLENE GLYCOL 3350 17 G/17G
POWDER ORAL
Qty: 510 G | Refills: 5 | Status: SHIPPED | OUTPATIENT
Start: 2023-08-07

## 2023-08-07 RX ORDER — OLOPATADINE HYDROCHLORIDE 1 MG/ML
1 SOLUTION/ DROPS OPHTHALMIC 2 TIMES DAILY
Qty: 5 ML | Refills: 1 | Status: SHIPPED | OUTPATIENT
Start: 2023-08-07 | End: 2023-11-05

## 2023-08-07 RX ORDER — POTASSIUM CHLORIDE 750 MG/1
20 TABLET, EXTENDED RELEASE ORAL 2 TIMES DAILY
Qty: 120 TABLET | Refills: 5 | Status: SHIPPED | OUTPATIENT
Start: 2023-08-07

## 2023-08-07 RX ORDER — ATORVASTATIN CALCIUM 20 MG/1
20 TABLET, FILM COATED ORAL DAILY
Qty: 30 TABLET | Refills: 5 | Status: SHIPPED | OUTPATIENT
Start: 2023-08-07

## 2023-08-07 RX ORDER — FLUTICASONE PROPIONATE 50 MCG
SPRAY, SUSPENSION (ML) NASAL
Qty: 1 EACH | Refills: 5 | Status: SHIPPED | OUTPATIENT
Start: 2023-08-07

## 2023-08-07 RX ORDER — LEVOTHYROXINE SODIUM 50 MCG
50 TABLET ORAL DAILY
Qty: 30 TABLET | Refills: 5 | Status: SHIPPED | OUTPATIENT
Start: 2023-08-07

## 2023-08-07 RX ORDER — ZINC OXIDE 13 %
CREAM (GRAM) TOPICAL
Qty: 30 CAPSULE | Refills: 5 | Status: SHIPPED | OUTPATIENT
Start: 2023-08-07

## 2023-08-07 RX ORDER — DULOXETIN HYDROCHLORIDE 60 MG/1
60 CAPSULE, DELAYED RELEASE ORAL DAILY
Qty: 30 CAPSULE | Refills: 5 | Status: SHIPPED | OUTPATIENT
Start: 2023-08-07

## 2023-08-07 RX ORDER — AMLODIPINE BESYLATE 5 MG/1
5 TABLET ORAL DAILY
Qty: 30 TABLET | Refills: 5 | Status: SHIPPED | OUTPATIENT
Start: 2023-08-07

## 2023-08-07 RX ORDER — HYDROCHLOROTHIAZIDE 25 MG/1
25 TABLET ORAL DAILY
Qty: 30 TABLET | Refills: 5 | Status: SHIPPED | OUTPATIENT
Start: 2023-08-07

## 2023-08-07 ASSESSMENT — ENCOUNTER SYMPTOMS
VOMITING: 0
ABDOMINAL PAIN: 1
BLOOD IN STOOL: 0

## 2023-08-07 NOTE — PROGRESS NOTES
[facility]  [address] [facility], [address]  Phone [phone]  Fax:  [phone]    Catrina Jean (:  1960) is a 65 y.o. female here for evaluation of the following chief complaint(s):  Hypothyroidism, Cholesterol Problem, Hypertension (Needs refills no labs done), and Discuss Medications (Wants a probiotic sent in so its free with her ins)       ASSESSMENT/PLAN:  1. RLQ abdominal pain  -     CT ABDOMEN PELVIS W IV CONTRAST Additional Contrast? Radiologist Recommendation; Future  2. Acquired hypothyroidism  -     TSH with Reflex; Future  3. Prediabetes  -     Hemoglobin A1C; Future  4. Mixed hyperlipidemia  -     Lipid Panel; Future  5. Essential (primary) hypertension  -     Comprehensive Metabolic Panel; Future  -     CBC with Auto Differential; Future  6. Depression, unspecified depression type  7. Colon cancer screening  -     External Referral To Gastroenterology    Reports intermittent RLQ abdominal pain for many months. No associated symptoms. Will check CT abdomen/pelvis for further work-up. Return precautions discussed. Check basic labs today. Blood pressure well controlled, continue amlodipine and HCTZ. Continue Lipitor for HLD. Continue Wellbutrin and Cymbalta for depression. Continue Synthroid 50 mcg daily for hypothyroidism. On Xarelto for history of PE. Followed by pulmonology for COPD and lung nodules. Was followed by pain management for chronic back/neck pain but reportedly was recently dismissed due to taking Xanax that she had leftover, on Oxycodone and Morphine. Patient requesting referral to new pain management group, she is to let me know where she wants me to refer her. Normal Pap smear 2018. Negative mammogram 10/2022. Colonoscopy 2019 with poor prep, patient needs repeat screening colonoscopy. Will refer her to GI for this. Return in about 6 months (around 2024) for routine f/u.          Subjective

## 2023-08-09 ENCOUNTER — TELEPHONE (OUTPATIENT)
Dept: FAMILY MEDICINE CLINIC | Facility: CLINIC | Age: 63
End: 2023-08-09

## 2023-08-09 ENCOUNTER — NURSE ONLY (OUTPATIENT)
Dept: FAMILY MEDICINE CLINIC | Facility: CLINIC | Age: 63
End: 2023-08-09

## 2023-08-09 DIAGNOSIS — E03.9 ACQUIRED HYPOTHYROIDISM: ICD-10-CM

## 2023-08-09 DIAGNOSIS — R73.03 PREDIABETES: ICD-10-CM

## 2023-08-09 DIAGNOSIS — E78.2 MIXED HYPERLIPIDEMIA: ICD-10-CM

## 2023-08-09 DIAGNOSIS — I10 ESSENTIAL (PRIMARY) HYPERTENSION: ICD-10-CM

## 2023-08-09 LAB
ALBUMIN SERPL-MCNC: 3.6 G/DL (ref 3.2–4.6)
ALBUMIN/GLOB SERPL: 1.1 (ref 0.4–1.6)
ALP SERPL-CCNC: 74 U/L (ref 50–136)
ALT SERPL-CCNC: 23 U/L (ref 12–65)
ANION GAP SERPL CALC-SCNC: 6 MMOL/L (ref 2–11)
AST SERPL-CCNC: 17 U/L (ref 15–37)
BASOPHILS # BLD: 0 K/UL (ref 0–0.2)
BASOPHILS NFR BLD: 0 % (ref 0–2)
BILIRUB SERPL-MCNC: 0.4 MG/DL (ref 0.2–1.1)
BUN SERPL-MCNC: 9 MG/DL (ref 8–23)
CALCIUM SERPL-MCNC: 9.1 MG/DL (ref 8.3–10.4)
CHLORIDE SERPL-SCNC: 103 MMOL/L (ref 101–110)
CHOLEST SERPL-MCNC: 187 MG/DL
CO2 SERPL-SCNC: 29 MMOL/L (ref 21–32)
CREAT SERPL-MCNC: 1.1 MG/DL (ref 0.6–1)
DIFFERENTIAL METHOD BLD: ABNORMAL
EOSINOPHIL # BLD: 0.2 K/UL (ref 0–0.8)
EOSINOPHIL NFR BLD: 4 % (ref 0.5–7.8)
ERYTHROCYTE [DISTWIDTH] IN BLOOD BY AUTOMATED COUNT: 13.1 % (ref 11.9–14.6)
GLOBULIN SER CALC-MCNC: 3.2 G/DL (ref 2.8–4.5)
GLUCOSE SERPL-MCNC: 120 MG/DL (ref 65–100)
HCT VFR BLD AUTO: 39.7 % (ref 35.8–46.3)
HDLC SERPL-MCNC: 106 MG/DL (ref 40–60)
HDLC SERPL: 1.8
HGB BLD-MCNC: 13.3 G/DL (ref 11.7–15.4)
IMM GRANULOCYTES # BLD AUTO: 0 K/UL (ref 0–0.5)
IMM GRANULOCYTES NFR BLD AUTO: 0 % (ref 0–5)
LDLC SERPL CALC-MCNC: 68.8 MG/DL
LYMPHOCYTES # BLD: 2 K/UL (ref 0.5–4.6)
LYMPHOCYTES NFR BLD: 46 % (ref 13–44)
MCH RBC QN AUTO: 33.9 PG (ref 26.1–32.9)
MCHC RBC AUTO-ENTMCNC: 33.5 G/DL (ref 31.4–35)
MCV RBC AUTO: 101.3 FL (ref 82–102)
MONOCYTES # BLD: 0.2 K/UL (ref 0.1–1.3)
MONOCYTES NFR BLD: 5 % (ref 4–12)
NEUTS SEG # BLD: 2 K/UL (ref 1.7–8.2)
NEUTS SEG NFR BLD: 45 % (ref 43–78)
NRBC # BLD: 0 K/UL (ref 0–0.2)
PLATELET # BLD AUTO: 304 K/UL (ref 150–450)
PMV BLD AUTO: 9.3 FL (ref 9.4–12.3)
POTASSIUM SERPL-SCNC: 3.7 MMOL/L (ref 3.5–5.1)
PROT SERPL-MCNC: 6.8 G/DL (ref 6.3–8.2)
RBC # BLD AUTO: 3.92 M/UL (ref 4.05–5.2)
SODIUM SERPL-SCNC: 138 MMOL/L (ref 133–143)
TRIGL SERPL-MCNC: 61 MG/DL (ref 35–150)
TSH W FREE THYROID IF ABNORMAL: 1.03 UIU/ML (ref 0.36–3.74)
VLDLC SERPL CALC-MCNC: 12.2 MG/DL (ref 6–23)
WBC # BLD AUTO: 4.5 K/UL (ref 4.3–11.1)

## 2023-08-09 NOTE — TELEPHONE ENCOUNTER
Pt states she spoke with radiology scheduling and they advised her that her [facility] will take 3 weeks to auth the CT scan and that if Dr. Dana Quinones wants it done this week the order needs to be changed to STAT. Please advise.

## 2023-08-10 LAB
EST. AVERAGE GLUCOSE BLD GHB EST-MCNC: 117 MG/DL
HBA1C MFR BLD: 5.7 % (ref 4.8–5.6)

## 2023-08-11 ENCOUNTER — TELEPHONE (OUTPATIENT)
Dept: FAMILY MEDICINE CLINIC | Facility: CLINIC | Age: 63
End: 2023-08-11

## 2023-08-17 ENCOUNTER — TELEPHONE (OUTPATIENT)
Dept: FAMILY MEDICINE CLINIC | Facility: CLINIC | Age: 63
End: 2023-08-17

## 2023-08-18 ENCOUNTER — TELEPHONE (OUTPATIENT)
Dept: FAMILY MEDICINE CLINIC | Facility: CLINIC | Age: 63
End: 2023-08-18

## 2023-08-24 RX ORDER — IPRATROPIUM BROMIDE 21 UG/1
SPRAY, METERED NASAL
Qty: 30 ML | Refills: 1 | Status: SHIPPED | OUTPATIENT
Start: 2023-08-24

## 2023-08-24 NOTE — TELEPHONE ENCOUNTER
Patient Refill Request     Last Office Visit: 03/31/23 with Dr. Tom Pope     When they were supposed to follow up: 6 months     Upcoming Office Visit: 09/20/23 with Dr. Arianna Perdomo Requested: Atrovent 0.03% Nasal Spray     Type of refill: 30 day     Requested Pharmacy:  [address] [place] [address]     Is prescription pended?  Yes

## 2023-09-01 DIAGNOSIS — J44.1 CHRONIC OBSTRUCTIVE PULMONARY DISEASE WITH (ACUTE) EXACERBATION (HCC): ICD-10-CM

## 2023-09-01 RX ORDER — ZINC OXIDE 13 %
CREAM (GRAM) TOPICAL
Qty: 30 CAPSULE | Refills: 5 | OUTPATIENT
Start: 2023-09-01

## 2023-09-01 RX ORDER — ALBUTEROL SULFATE 90 UG/1
2 AEROSOL, METERED RESPIRATORY (INHALATION) EVERY 4 HOURS PRN
Qty: 18 G | Refills: 11 | Status: SHIPPED | OUTPATIENT
Start: 2023-09-01

## 2023-09-01 RX ORDER — FLUTICASONE PROPIONATE 50 MCG
SPRAY, SUSPENSION (ML) NASAL
Qty: 1 EACH | Refills: 5 | OUTPATIENT
Start: 2023-09-01

## 2023-09-01 RX ORDER — ALBUTEROL SULFATE 90 UG/1
2 AEROSOL, METERED RESPIRATORY (INHALATION) EVERY 4 HOURS PRN
Qty: 18 G | Refills: 11 | OUTPATIENT
Start: 2023-09-01

## 2023-09-01 RX ORDER — OLOPATADINE HYDROCHLORIDE 1 MG/ML
1 SOLUTION/ DROPS OPHTHALMIC 2 TIMES DAILY
Qty: 5 ML | Refills: 1 | OUTPATIENT
Start: 2023-09-01 | End: 2023-11-30

## 2023-09-05 ENCOUNTER — TELEPHONE (OUTPATIENT)
Dept: FAMILY MEDICINE CLINIC | Facility: CLINIC | Age: 63
End: 2023-09-05

## 2023-09-05 RX ORDER — PREDNISONE 5 MG/1
TABLET ORAL
Qty: 30 TABLET | Refills: 1 | Status: SHIPPED | OUTPATIENT
Start: 2023-09-05

## 2023-09-05 NOTE — TELEPHONE ENCOUNTER
Patient Refill Request     Last Office Visit: 03/31/23 with Dr. Power Spangler     When they were supposed to follow up: 6 months     Upcoming Office Visit: 09/20/23 with Dr. Aida Cooper Requested: Prednisone 5 mg     Type of refill: 30 day     Requested Pharmacy: [address] [place] [address]     Is prescription pended?  Yes

## 2023-09-05 NOTE — TELEPHONE ENCOUNTER
Irma Mark Judes called today to let you still no referral has been done and only has few pills left she would like to go to [facility] Phone # [phone]  and fax # [phone] states needs it asap

## 2023-09-13 ENCOUNTER — TELEPHONE (OUTPATIENT)
Dept: PULMONOLOGY | Age: 63
End: 2023-09-13

## 2023-09-13 DIAGNOSIS — Z87.891 FORMER SMOKER: Primary | ICD-10-CM

## 2023-09-13 DIAGNOSIS — R05.9 COUGH, UNSPECIFIED TYPE: ICD-10-CM

## 2023-09-13 DIAGNOSIS — J44.9 CHRONIC OBSTRUCTIVE PULMONARY DISEASE, UNSPECIFIED COPD TYPE (HCC): ICD-10-CM

## 2023-09-13 NOTE — TELEPHONE ENCOUNTER
Called patient to schedule CPFT prior to her appointment on 9/20 with Dr Brenna Holley. Left messages for return call.

## 2023-09-14 NOTE — TELEPHONE ENCOUNTER
I spoke with patient. She wished to postpone her appointment with Dr Stephanie Arcos until Dec.  She reports recent loss in her family. Patient is scheduled on 12/27 at 2 for CPFT, she will then have CXR followed by appointment at 4 pm to see Dr Stephanie Arcos.

## 2023-09-25 RX ORDER — DIPHENHYDRAMINE HYDROCHLORIDE 25 MG/1
CAPSULE ORAL
Qty: 30 CAPSULE | Refills: 11 | OUTPATIENT
Start: 2023-09-25

## 2023-10-06 ENCOUNTER — TELEPHONE (OUTPATIENT)
Dept: FAMILY MEDICINE CLINIC | Facility: CLINIC | Age: 63
End: 2023-10-06

## 2023-10-06 NOTE — TELEPHONE ENCOUNTER
----- Message from Tamia Juana sent at 10/5/2023  4:54 PM EDT -----  Subject: Referral Request    Reason for referral request? pt. Simba Prieto indicates she doesn't want   to go to [facility] pain medicine (referral [id number]) she would instead like   to go to [facility] and if the referral can be sent there as   soon as possible, pt. states she only wants to communicate w/Steve,   Isrrael Yoo MD about this matter  Provider patient wants to be referred to(if known):     Provider Phone Number(if known):     Additional Information for Provider? preferred callback ph# is mobile but   can also call home phone if needed  ---------------------------------------------------------------------------  --------------  Titi Raritan Seven    [phone]; OK to leave message on voicemail,OK to respond with electronic   message via [employer] portal (only for patients who have registered [employer]   account)  ---------------------------------------------------------------------------  --------------

## 2023-10-10 DIAGNOSIS — J44.1 CHRONIC OBSTRUCTIVE PULMONARY DISEASE WITH (ACUTE) EXACERBATION (HCC): ICD-10-CM

## 2023-10-11 RX ORDER — GUAIFENESIN 600 MG/1
600 TABLET, EXTENDED RELEASE ORAL 2 TIMES DAILY
Qty: 60 TABLET | Refills: 11 | Status: SHIPPED | OUTPATIENT
Start: 2023-10-11

## 2023-10-11 NOTE — TELEPHONE ENCOUNTER
Patient Refill Request     Last Office Visit: 03/31/23 with Dr. Lisa Nelson     When they were supposed to follow up: 6 months     Upcoming Office Visit: 12/27/23 with Dr. Kenzie Crisostomo Requested: Mucinex 600 mg     Type of refill: 30 day     Requested Pharmacy:  [address] [place] [address]     Is prescription pended?  Yes

## 2023-10-26 ENCOUNTER — OFFICE VISIT (OUTPATIENT)
Dept: FAMILY MEDICINE CLINIC | Facility: CLINIC | Age: 63
End: 2023-10-26
Payer: MEDICAID

## 2023-10-26 VITALS
OXYGEN SATURATION: 97 % | BODY MASS INDEX: 27.97 KG/M2 | SYSTOLIC BLOOD PRESSURE: 110 MMHG | TEMPERATURE: 97 F | WEIGHT: 152 LBS | HEART RATE: 94 BPM | HEIGHT: 62 IN | DIASTOLIC BLOOD PRESSURE: 78 MMHG

## 2023-10-26 DIAGNOSIS — J40 BRONCHITIS: ICD-10-CM

## 2023-10-26 DIAGNOSIS — R10.31 RLQ ABDOMINAL PAIN: Primary | ICD-10-CM

## 2023-10-26 DIAGNOSIS — R14.0 ABDOMINAL BLOATING: ICD-10-CM

## 2023-10-26 PROCEDURE — 3074F SYST BP LT 130 MM HG: CPT | Performed by: STUDENT IN AN ORGANIZED HEALTH CARE EDUCATION/TRAINING PROGRAM

## 2023-10-26 PROCEDURE — 99213 OFFICE O/P EST LOW 20 MIN: CPT | Performed by: STUDENT IN AN ORGANIZED HEALTH CARE EDUCATION/TRAINING PROGRAM

## 2023-10-26 PROCEDURE — 3078F DIAST BP <80 MM HG: CPT | Performed by: STUDENT IN AN ORGANIZED HEALTH CARE EDUCATION/TRAINING PROGRAM

## 2023-10-26 RX ORDER — POTASSIUM CHLORIDE 750 MG/1
TABLET, EXTENDED RELEASE ORAL
Qty: 240 TABLET | Refills: 5 | Status: SHIPPED | OUTPATIENT
Start: 2023-10-26

## 2023-10-26 RX ORDER — BACLOFEN 10 MG/1
10 TABLET ORAL 3 TIMES DAILY
Qty: 90 TABLET | Refills: 5 | Status: SHIPPED | OUTPATIENT
Start: 2023-10-26

## 2023-10-26 RX ORDER — CETIRIZINE HYDROCHLORIDE 10 MG/1
10 TABLET ORAL DAILY
Qty: 30 TABLET | Refills: 5 | Status: SHIPPED | OUTPATIENT
Start: 2023-10-26

## 2023-10-26 RX ORDER — LANOLIN ALCOHOL/MO/W.PET/CERES
1 CREAM (GRAM) TOPICAL DAILY
Qty: 90 TABLET | Refills: 1 | Status: SHIPPED | OUTPATIENT
Start: 2023-10-26

## 2023-10-26 RX ORDER — CHLORAL HYDRATE 500 MG
CAPSULE ORAL
Qty: 120 CAPSULE | Refills: 5 | Status: SHIPPED | OUTPATIENT
Start: 2023-10-26

## 2023-10-26 RX ORDER — DOXYCYCLINE HYCLATE 100 MG/1
100 CAPSULE ORAL 2 TIMES DAILY
Qty: 10 CAPSULE | Refills: 0 | Status: SHIPPED | OUTPATIENT
Start: 2023-10-26 | End: 2023-10-31

## 2023-10-26 RX ORDER — IPRATROPIUM BROMIDE 21 UG/1
SPRAY, METERED NASAL
Qty: 30 ML | Refills: 5 | Status: SHIPPED | OUTPATIENT
Start: 2023-10-26

## 2023-10-26 SDOH — ECONOMIC STABILITY: FOOD INSECURITY: WITHIN THE PAST 12 MONTHS, THE FOOD YOU BOUGHT JUST DIDN'T LAST AND YOU DIDN'T HAVE MONEY TO GET MORE.: NEVER TRUE

## 2023-10-26 SDOH — ECONOMIC STABILITY: INCOME INSECURITY: HOW HARD IS IT FOR YOU TO PAY FOR THE VERY BASICS LIKE FOOD, HOUSING, MEDICAL CARE, AND HEATING?: NOT HARD AT ALL

## 2023-10-26 SDOH — ECONOMIC STABILITY: HOUSING INSECURITY
IN THE LAST 12 MONTHS, WAS THERE A TIME WHEN YOU DID NOT HAVE A STEADY PLACE TO SLEEP OR SLEPT IN A SHELTER (INCLUDING NOW)?: NO

## 2023-10-26 SDOH — ECONOMIC STABILITY: FOOD INSECURITY: WITHIN THE PAST 12 MONTHS, YOU WORRIED THAT YOUR FOOD WOULD RUN OUT BEFORE YOU GOT MONEY TO BUY MORE.: NEVER TRUE

## 2023-10-26 ASSESSMENT — PATIENT HEALTH QUESTIONNAIRE - PHQ9
1. LITTLE INTEREST OR PLEASURE IN DOING THINGS: 1
SUM OF ALL RESPONSES TO PHQ QUESTIONS 1-9: 3
9. THOUGHTS THAT YOU WOULD BE BETTER OFF DEAD, OR OF HURTING YOURSELF: 0
7. TROUBLE CONCENTRATING ON THINGS, SUCH AS READING THE NEWSPAPER OR WATCHING TELEVISION: 0
SUM OF ALL RESPONSES TO PHQ9 QUESTIONS 1 & 2: 2
2. FEELING DOWN, DEPRESSED OR HOPELESS: 1
SUM OF ALL RESPONSES TO PHQ QUESTIONS 1-9: 3
8. MOVING OR SPEAKING SO SLOWLY THAT OTHER PEOPLE COULD HAVE NOTICED. OR THE OPPOSITE, BEING SO FIGETY OR RESTLESS THAT YOU HAVE BEEN MOVING AROUND A LOT MORE THAN USUAL: 0
10. IF YOU CHECKED OFF ANY PROBLEMS, HOW DIFFICULT HAVE THESE PROBLEMS MADE IT FOR YOU TO DO YOUR WORK, TAKE CARE OF THINGS AT HOME, OR GET ALONG WITH OTHER PEOPLE: 0
4. FEELING TIRED OR HAVING LITTLE ENERGY: 1
5. POOR APPETITE OR OVEREATING: 0
3. TROUBLE FALLING OR STAYING ASLEEP: 0
SUM OF ALL RESPONSES TO PHQ QUESTIONS 1-9: 3
6. FEELING BAD ABOUT YOURSELF - OR THAT YOU ARE A FAILURE OR HAVE LET YOURSELF OR YOUR FAMILY DOWN: 0
SUM OF ALL RESPONSES TO PHQ QUESTIONS 1-9: 3

## 2023-10-26 ASSESSMENT — COLUMBIA-SUICIDE SEVERITY RATING SCALE - C-SSRS
4. HAVE YOU HAD THESE THOUGHTS AND HAD SOME INTENTION OF ACTING ON THEM?: NO
3. HAVE YOU BEEN THINKING ABOUT HOW YOU MIGHT KILL YOURSELF?: NO
5. HAVE YOU STARTED TO WORK OUT OR WORKED OUT THE DETAILS OF HOW TO KILL YOURSELF? DO YOU INTEND TO CARRY OUT THIS PLAN?: NO
7. DID THIS OCCUR IN THE LAST THREE MONTHS: NO

## 2023-11-01 ENCOUNTER — TELEPHONE (OUTPATIENT)
Dept: FAMILY MEDICINE CLINIC | Facility: CLINIC | Age: 63
End: 2023-11-01

## 2023-11-01 NOTE — TELEPHONE ENCOUNTER
[facility]) - [id number]  Potassium Chloride Lucero ER 10MEQ er tablets  Outcome: ApprovedCreated: October 26th, 2023 [phone]: October 31st, 2023

## 2023-11-30 RX ORDER — PREDNISONE 5 MG/1
TABLET ORAL
Qty: 30 TABLET | Refills: 1 | Status: SHIPPED | OUTPATIENT
Start: 2023-11-30

## 2023-11-30 NOTE — TELEPHONE ENCOUNTER
DR Suly Feng, Patient called refill line requesting prednisone 5 mg, LOV 03/31/2023. I pend RX to her [facility] [employer]. .. jodi valle

## 2023-11-30 NOTE — TELEPHONE ENCOUNTER
PA for [facility] sent to [facility] via CoverMyMeds. Key: [id number]    Request was approved from 11/30/23-11/30/24 / PA# [id number]    PA for Xarelto sent to [facility] via CoverMyMeds.  Key: [id number]    Request was approved from 12/01/23-12/01/24 / PA# [id number]

## 2023-12-05 ENCOUNTER — OFFICE VISIT (OUTPATIENT)
Dept: AUDIOLOGY | Age: 63
End: 2023-12-05
Payer: MEDICAID

## 2023-12-05 ENCOUNTER — OFFICE VISIT (OUTPATIENT)
Dept: ENT CLINIC | Age: 63
End: 2023-12-05
Payer: MEDICAID

## 2023-12-05 VITALS
HEIGHT: 62 IN | BODY MASS INDEX: 27.97 KG/M2 | DIASTOLIC BLOOD PRESSURE: 78 MMHG | WEIGHT: 152 LBS | SYSTOLIC BLOOD PRESSURE: 110 MMHG

## 2023-12-05 DIAGNOSIS — J34.89 NASAL SEPTAL PERFORATION: Chronic | ICD-10-CM

## 2023-12-05 DIAGNOSIS — H93.13 TINNITUS OF BOTH EARS: Chronic | ICD-10-CM

## 2023-12-05 DIAGNOSIS — S03.00XD DISLOCATION OF TEMPOROMANDIBULAR JOINT, SUBSEQUENT ENCOUNTER: Chronic | ICD-10-CM

## 2023-12-05 DIAGNOSIS — H90.3 SENSORINEURAL HEARING LOSS (SNHL) OF BOTH EARS: Primary | Chronic | ICD-10-CM

## 2023-12-05 DIAGNOSIS — H90.3 SENSORINEURAL HEARING LOSS, BILATERAL: Primary | ICD-10-CM

## 2023-12-05 PROCEDURE — 3074F SYST BP LT 130 MM HG: CPT | Performed by: PHYSICIAN ASSISTANT

## 2023-12-05 PROCEDURE — 92557 COMPREHENSIVE HEARING TEST: CPT | Performed by: AUDIOLOGIST

## 2023-12-05 PROCEDURE — 92567 TYMPANOMETRY: CPT | Performed by: AUDIOLOGIST

## 2023-12-05 PROCEDURE — 99203 OFFICE O/P NEW LOW 30 MIN: CPT | Performed by: PHYSICIAN ASSISTANT

## 2023-12-05 PROCEDURE — 3078F DIAST BP <80 MM HG: CPT | Performed by: PHYSICIAN ASSISTANT

## 2023-12-05 ASSESSMENT — ENCOUNTER SYMPTOMS
EYES NEGATIVE: 1
ALLERGIC/IMMUNOLOGIC NEGATIVE: 1
GASTROINTESTINAL NEGATIVE: 1
RESPIRATORY NEGATIVE: 1

## 2023-12-05 NOTE — PROGRESS NOTES
AUDIOLOGY EVALUATION    Renuka Mcarthur had Tympanometry and Audiometry performed today. The patient reports tinnitus. Results as follows:    Tympanometry    Type A -  bilaterally    Audiometry    Test Performed - Comprehensive Audiogram    Type of Loss - Right Ear: abnormal hearing: degree of loss is normal to moderate sensorineural hearing loss                           Left Ear: abnormal hearing: degree of loss is normal to moderate sensorineural hearing loss     SRT   Measurement Right Ear Left Ear   Value 20 20   Unit dB dB     Discrimination  Measurement Right Ear Left Ear   Value 100% 100%   Unit dB dB     Recommend  Binaural amplification and annual audios    A.  [address] [facility], [address] [facility]  Audiologist
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
## 2024-01-22 RX ORDER — FLUTICASONE PROPIONATE 50 MCG
SPRAY, SUSPENSION (ML) NASAL
Qty: 1 EACH | Refills: 5 | Status: SHIPPED | OUTPATIENT
Start: 2024-01-22

## 2024-01-22 RX ORDER — GABAPENTIN 300 MG/1
600 CAPSULE ORAL 2 TIMES DAILY
Qty: 120 CAPSULE | Refills: 5 | Status: SHIPPED | OUTPATIENT
Start: 2024-01-22 | End: 2024-07-20

## 2024-01-22 RX ORDER — ZINC OXIDE 13 %
CREAM (GRAM) TOPICAL
Qty: 30 CAPSULE | Refills: 5 | Status: SHIPPED | OUTPATIENT
Start: 2024-01-22

## 2024-01-22 RX ORDER — OMEPRAZOLE 40 MG/1
40 CAPSULE, DELAYED RELEASE ORAL DAILY
Qty: 30 CAPSULE | Refills: 5 | Status: SHIPPED | OUTPATIENT
Start: 2024-01-22

## 2024-01-22 RX ORDER — PREDNISONE 5 MG/1
TABLET ORAL
Qty: 30 TABLET | Refills: 1 | Status: CANCELLED | OUTPATIENT
Start: 2024-01-22

## 2024-01-22 RX ORDER — LANOLIN ALCOHOL/MO/W.PET/CERES
1 CREAM (GRAM) TOPICAL DAILY
Qty: 90 TABLET | Refills: 1 | Status: SHIPPED | OUTPATIENT
Start: 2024-01-22

## 2024-01-22 RX ORDER — ASCORBIC ACID 500 MG
TABLET ORAL
Qty: 60 TABLET | Refills: 5 | Status: SHIPPED | OUTPATIENT
Start: 2024-01-22

## 2024-01-22 RX ORDER — BACLOFEN 10 MG/1
10 TABLET ORAL 3 TIMES DAILY
Qty: 90 TABLET | Refills: 5 | Status: SHIPPED | OUTPATIENT
Start: 2024-01-22

## 2024-01-22 RX ORDER — BUPROPION HYDROCHLORIDE 300 MG/1
300 TABLET ORAL EVERY MORNING
Qty: 30 TABLET | Refills: 5 | Status: SHIPPED | OUTPATIENT
Start: 2024-01-22

## 2024-01-22 RX ORDER — LANOLIN ALCOHOL/MO/W.PET/CERES
0.4 CREAM (GRAM) TOPICAL DAILY
Qty: 30 TABLET | Refills: 5 | Status: SHIPPED | OUTPATIENT
Start: 2024-01-22

## 2024-01-23 RX ORDER — FLUTICASONE FUROATE AND VILANTEROL 200; 25 UG/1; UG/1
1 POWDER RESPIRATORY (INHALATION) DAILY
Qty: 1 EACH | Refills: 1 | Status: SHIPPED | OUTPATIENT
Start: 2024-01-23

## 2024-01-24 ENCOUNTER — TELEPHONE (OUTPATIENT)
Dept: PULMONOLOGY | Age: 64
End: 2024-01-24

## 2024-01-26 ENCOUNTER — TELEPHONE (OUTPATIENT)
Dept: ENT CLINIC | Age: 64
End: 2024-01-26

## 2024-01-26 ENCOUNTER — TELEPHONE (OUTPATIENT)
Dept: PULMONOLOGY | Age: 64
End: 2024-01-26

## 2024-01-26 RX ORDER — BRAN 5 G
1 WAFER ORAL DAILY
Qty: 90 TABLET | Refills: 3 | Status: SHIPPED | OUTPATIENT
Start: 2024-01-26 | End: 2024-04-25

## 2024-01-26 NOTE — TELEPHONE ENCOUNTER
Tried calling [facility] back but they are currently closed for the day.  Will try calling them back again on Monday. // Viviana Jacobsen [facility]

## 2024-01-26 NOTE — TELEPHONE ENCOUNTER
Given her history of factor v leiden and PE in the past it would be best if she was bridged with lovenox. She would need to stop the xarelto 2 days prior to the procedure and take lovenox sub q injection 90mg the morning before the procedure and then not take any anticoagulation the day of the procedure, restarting her xarelto as soon after as GI is alright with.     Néstor Valerio MD

## 2024-01-26 NOTE — TELEPHONE ENCOUNTER
Pt called to check on the prescription from back in December - lipoflavinoids - she said pharmacy never got it.     Please advise

## 2024-01-26 NOTE — TELEPHONE ENCOUNTER
Latonya with [facility] wants to know if it's okay for the patient to hold her xarelto for her colonoscopy. Please call her at [phone]

## 2024-01-29 NOTE — TELEPHONE ENCOUNTER
Spoke with Latonya and relayed Dr. Valerio's recommendations.  Latonya requested that we fax the telephone conversation so she can relay Dr. Valerio's recommendations to her Nurse Practitioner.  Telephone notes have been faxed. // Viviana Jacobsen [facility]

## 2024-01-30 RX ORDER — PREDNISONE 5 MG/1
TABLET ORAL
Qty: 30 TABLET | Refills: 1 | OUTPATIENT
Start: 2024-01-30

## 2024-02-09 ENCOUNTER — TELEPHONE (OUTPATIENT)
Dept: PULMONOLOGY | Age: 64
End: 2024-02-09

## 2024-02-09 RX ORDER — DIPHENHYDRAMINE HCL 25 MG
25 CAPSULE ORAL EVERY 6 HOURS PRN
Qty: 120 CAPSULE | Refills: 3 | Status: SHIPPED | OUTPATIENT
Start: 2024-02-09

## 2024-02-09 RX ORDER — PREDNISONE 5 MG/1
TABLET ORAL
Qty: 30 TABLET | Refills: 2 | Status: SHIPPED | OUTPATIENT
Start: 2024-02-09

## 2024-02-09 NOTE — TELEPHONE ENCOUNTER
MD approval via return communication thru [employer] for Prednisone and Benadryl refills until appointment in April. Sent to requested pharmacy and left patient a message to notify.

## 2024-02-09 NOTE — TELEPHONE ENCOUNTER
Call to refill line for Prednisone and Benadryl refills. [employer] to Dr Valerio to assure approval to refill until April 2024 appointment.

## 2024-02-21 RX ORDER — DULOXETIN HYDROCHLORIDE 60 MG/1
60 CAPSULE, DELAYED RELEASE ORAL DAILY
Qty: 30 CAPSULE | Refills: 5 | Status: SHIPPED | OUTPATIENT
Start: 2024-02-21

## 2024-02-21 RX ORDER — AMLODIPINE BESYLATE 5 MG/1
5 TABLET ORAL DAILY
Qty: 30 TABLET | Refills: 5 | Status: SHIPPED | OUTPATIENT
Start: 2024-02-21

## 2024-02-21 RX ORDER — POTASSIUM CHLORIDE 750 MG/1
TABLET, EXTENDED RELEASE ORAL
Qty: 240 TABLET | Refills: 5 | Status: SHIPPED | OUTPATIENT
Start: 2024-02-21

## 2024-02-21 RX ORDER — HYDROCHLOROTHIAZIDE 25 MG/1
25 TABLET ORAL DAILY
Qty: 30 TABLET | Refills: 5 | Status: SHIPPED | OUTPATIENT
Start: 2024-02-21

## 2024-02-21 RX ORDER — LEVOTHYROXINE SODIUM 50 MCG
50 TABLET ORAL DAILY
Qty: 30 TABLET | Refills: 5 | Status: SHIPPED | OUTPATIENT
Start: 2024-02-21

## 2024-02-21 RX ORDER — ATORVASTATIN CALCIUM 20 MG/1
20 TABLET, FILM COATED ORAL DAILY
Qty: 30 TABLET | Refills: 5 | Status: SHIPPED | OUTPATIENT
Start: 2024-02-21

## 2024-02-21 NOTE — TELEPHONE ENCOUNTER
Patient requesting a refill on Amlodipine, Atorvastatin,Cymbalta,Hydrochlorothiazide,Potassium,Synthroid,Flonase.  Needs to be sent to [address]

## 2024-03-19 RX ORDER — IPRATROPIUM BROMIDE 21 UG/1
SPRAY, METERED NASAL
Qty: 30 ML | Refills: 5 | Status: SHIPPED | OUTPATIENT
Start: 2024-03-19

## 2024-03-19 RX ORDER — POLYETHYLENE GLYCOL 3350 17 G/17G
POWDER ORAL
Qty: 510 G | Refills: 5 | Status: SHIPPED | OUTPATIENT
Start: 2024-03-19

## 2024-03-19 RX ORDER — FLUTICASONE FUROATE AND VILANTEROL 200; 25 UG/1; UG/1
1 POWDER RESPIRATORY (INHALATION) DAILY
Qty: 1 EACH | Refills: 1 | Status: SHIPPED | OUTPATIENT
Start: 2024-03-19

## 2024-03-19 NOTE — TELEPHONE ENCOUNTER
Patient called the refill line requesting a refill on her rivaroxaban (XARELTO) 20 MG TABS tablets, and her fluticasone furoate-vilanterol (BREO ELLIPTA) 200-25 MCG/ACT AEPB inhaler. Last seen on 03/31/2023 was to return in 6 months. Has return visit for 04/22/2024 with [facility] before her appointment scheduled.

## 2024-03-19 NOTE — TELEPHONE ENCOUNTER
[facility]    Vit b12  Miralax  Probioctic  for stomache the pa  call her about the PA she said it was sent in for thrust  Ipratopium spray

## 2024-04-22 ENCOUNTER — HOSPITAL ENCOUNTER (OUTPATIENT)
Dept: GENERAL RADIOLOGY | Age: 64
Discharge: HOME | End: 2024-04-25
Payer: MEDICAID

## 2024-04-22 ENCOUNTER — NURSE ONLY (OUTPATIENT)
Dept: PULMONOLOGY | Age: 64
End: 2024-04-22
Payer: MEDICAID

## 2024-04-22 ENCOUNTER — OFFICE VISIT (OUTPATIENT)
Dept: PULMONOLOGY | Age: 64
End: 2024-04-22
Payer: MEDICAID

## 2024-04-22 VITALS
HEIGHT: 62 IN | BODY MASS INDEX: 28.89 KG/M2 | HEART RATE: 104 BPM | TEMPERATURE: 97.2 F | OXYGEN SATURATION: 95 % | SYSTOLIC BLOOD PRESSURE: 115 MMHG | DIASTOLIC BLOOD PRESSURE: 70 MMHG | RESPIRATION RATE: 18 BRPM | WEIGHT: 157 LBS

## 2024-04-22 DIAGNOSIS — Z86.718 HISTORY OF DVT (DEEP VEIN THROMBOSIS): ICD-10-CM

## 2024-04-22 DIAGNOSIS — R05.9 COUGH, UNSPECIFIED TYPE: ICD-10-CM

## 2024-04-22 DIAGNOSIS — J44.1 CHRONIC OBSTRUCTIVE PULMONARY DISEASE WITH (ACUTE) EXACERBATION (HCC): Primary | ICD-10-CM

## 2024-04-22 DIAGNOSIS — R91.1 LUNG NODULE: ICD-10-CM

## 2024-04-22 DIAGNOSIS — Z87.891 FORMER SMOKER: ICD-10-CM

## 2024-04-22 DIAGNOSIS — D68.51 FACTOR V LEIDEN MUTATION (HCC): ICD-10-CM

## 2024-04-22 DIAGNOSIS — Z87.891 PERSONAL HISTORY OF TOBACCO USE, PRESENTING HAZARDS TO HEALTH: ICD-10-CM

## 2024-04-22 DIAGNOSIS — J44.9 CHRONIC OBSTRUCTIVE PULMONARY DISEASE, UNSPECIFIED COPD TYPE (HCC): ICD-10-CM

## 2024-04-22 DIAGNOSIS — Z86.711 PERSONAL HISTORY OF PULMONARY EMBOLISM: ICD-10-CM

## 2024-04-22 LAB
FEF25-27, POC: 1.64 L/S
FET, POC: NORMAL
FEV 1 , POC: 2.05 L
FEV1/FVC, POC: NORMAL
FVC, POC: NORMAL
LUNG AGE, POC: NORMAL
PEF, POC: 4.21 L/S

## 2024-04-22 PROCEDURE — 94010 BREATHING CAPACITY TEST: CPT | Performed by: INTERNAL MEDICINE

## 2024-04-22 PROCEDURE — 99214 OFFICE O/P EST MOD 30 MIN: CPT | Performed by: INTERNAL MEDICINE

## 2024-04-22 PROCEDURE — 3078F DIAST BP <80 MM HG: CPT | Performed by: INTERNAL MEDICINE

## 2024-04-22 PROCEDURE — 71046 X-RAY EXAM CHEST 2 VIEWS: CPT

## 2024-04-22 PROCEDURE — 94729 DIFFUSING CAPACITY: CPT | Performed by: INTERNAL MEDICINE

## 2024-04-22 PROCEDURE — 3074F SYST BP LT 130 MM HG: CPT | Performed by: INTERNAL MEDICINE

## 2024-04-22 PROCEDURE — 94726 PLETHYSMOGRAPHY LUNG VOLUMES: CPT | Performed by: INTERNAL MEDICINE

## 2024-04-22 RX ORDER — FLUTICASONE FUROATE AND VILANTEROL 200; 25 UG/1; UG/1
1 POWDER RESPIRATORY (INHALATION) DAILY
Qty: 1 EACH | Refills: 1 | Status: SHIPPED | OUTPATIENT
Start: 2024-04-22

## 2024-04-22 RX ORDER — PREDNISONE 2.5 MG/1
2.5 TABLET ORAL DAILY
Qty: 30 TABLET | Refills: 0 | Status: SHIPPED | OUTPATIENT
Start: 2024-04-22

## 2024-04-22 RX ORDER — OLOPATADINE HYDROCHLORIDE 1 MG/ML
SOLUTION/ DROPS OPHTHALMIC
COMMUNITY
Start: 2024-03-26

## 2024-04-22 RX ORDER — CETIRIZINE HYDROCHLORIDE 10 MG/1
10 TABLET ORAL DAILY
Qty: 30 TABLET | Refills: 5 | Status: SHIPPED | OUTPATIENT
Start: 2024-04-22

## 2024-04-22 NOTE — PROGRESS NOTES
Patient Name:  Mayda Dyer                             YOB: 1960  MRN: [id number]                                              Office Visit 4/22/2024    ASSESSMENT AND PLAN:  (Medical Decision Making)    Pt with history of former tobacco abuse, emphysema seen on CT scan. Factor V Leiden s/p DVT and PE. On lifelong anticoagulation. Tolerating this well.   Covid with prolonged illness.   CT scan with small but new B pulmonary nodules. Stable in February 2023. Overdue for annual repeat.     -repeat CT now. If stable qualifies for annual screening CT's.   Discussed with patient current guidelines for screening for lung cancer.  Current recommendations are to obtain yearly screening LDCT yearly from age 50-80, or until smoke free for 15 years.  Patient has 45 pack year history of cigarette smoking and currently quit 7 years ago.  Discussed with patient risks and benefits of screening, including over-diagnosis, false positive rate, and total radiation exposure.  Patient currently exhibits no signs or symptoms suggestive of lung cancer.  Discussed with patient importance of compliance with yearly annual lung cancer screenings and willingness to undergo diagnosis and treatment if screening scan is positive.    -taper prednisone 2.5 x 1 month and then stop.   -refilled breo 200.   -prn albuterol.   -cont 2LO2 at night.   -continue xarelto 20mg daily. Planning life long therapy with factor V leiden.    -cont benadryl, mucinex, cetirizine    F/u in 6 months.     Mayda was seen today for follow-up.    Diagnoses and all orders for this visit:    Chronic obstructive pulmonary disease with (acute) exacerbation (HCC)  -     CT CHEST WO CONTRAST; Future    Personal history of tobacco use, presenting hazards to health  -     CT CHEST WO CONTRAST; Future    Personal history of pulmonary embolism    History of DVT (deep vein thrombosis)    Factor V Leiden mutation (HCC)    Lung nodule  -     CT CHEST WO

## 2024-04-24 DIAGNOSIS — R73.03 PREDIABETES: ICD-10-CM

## 2024-04-24 DIAGNOSIS — E78.2 MIXED HYPERLIPIDEMIA: ICD-10-CM

## 2024-04-24 DIAGNOSIS — I10 ESSENTIAL (PRIMARY) HYPERTENSION: ICD-10-CM

## 2024-04-24 DIAGNOSIS — E03.9 ACQUIRED HYPOTHYROIDISM: Primary | ICD-10-CM

## 2024-04-25 DIAGNOSIS — E78.2 MIXED HYPERLIPIDEMIA: ICD-10-CM

## 2024-04-25 DIAGNOSIS — R73.03 PREDIABETES: ICD-10-CM

## 2024-04-25 DIAGNOSIS — I10 ESSENTIAL (PRIMARY) HYPERTENSION: ICD-10-CM

## 2024-04-25 DIAGNOSIS — E03.9 ACQUIRED HYPOTHYROIDISM: ICD-10-CM

## 2024-04-25 LAB
ALBUMIN SERPL-MCNC: 4 G/DL (ref 3.2–4.6)
ALBUMIN/GLOB SERPL: 1.5 (ref 1–1.9)
ALP SERPL-CCNC: 90 U/L (ref 35–104)
ALT SERPL-CCNC: 21 U/L (ref 12–65)
ANION GAP SERPL CALC-SCNC: 12 MMOL/L (ref 9–18)
AST SERPL-CCNC: 30 U/L (ref 15–37)
BASOPHILS # BLD: 0 K/UL (ref 0–0.2)
BASOPHILS NFR BLD: 0 % (ref 0–2)
BILIRUB SERPL-MCNC: 0.3 MG/DL (ref 0–1.2)
BUN SERPL-MCNC: 11 MG/DL (ref 8–23)
CALCIUM SERPL-MCNC: 9.4 MG/DL (ref 8.8–10.2)
CHLORIDE SERPL-SCNC: 101 MMOL/L (ref 98–107)
CHOLEST SERPL-MCNC: 206 MG/DL (ref 0–200)
CO2 SERPL-SCNC: 28 MMOL/L (ref 20–28)
CREAT SERPL-MCNC: 1.04 MG/DL (ref 0.6–1.1)
DIFFERENTIAL METHOD BLD: ABNORMAL
EOSINOPHIL # BLD: 0.1 K/UL (ref 0–0.8)
EOSINOPHIL NFR BLD: 2 % (ref 0.5–7.8)
ERYTHROCYTE [DISTWIDTH] IN BLOOD BY AUTOMATED COUNT: 12.4 % (ref 11.9–14.6)
EST. AVERAGE GLUCOSE BLD GHB EST-MCNC: 121 MG/DL
GLOBULIN SER CALC-MCNC: 2.7 G/DL (ref 2.3–3.5)
GLUCOSE SERPL-MCNC: 119 MG/DL (ref 70–99)
HBA1C MFR BLD: 5.9 % (ref 0–5.6)
HCT VFR BLD AUTO: 38.4 % (ref 35.8–46.3)
HDLC SERPL-MCNC: 107 MG/DL (ref 40–60)
HDLC SERPL: 1.9 (ref 0–5)
HGB BLD-MCNC: 12.7 G/DL (ref 11.7–15.4)
IMM GRANULOCYTES # BLD AUTO: 0 K/UL (ref 0–0.5)
IMM GRANULOCYTES NFR BLD AUTO: 0 % (ref 0–5)
LDLC SERPL CALC-MCNC: 85 MG/DL (ref 0–100)
LYMPHOCYTES # BLD: 2.2 K/UL (ref 0.5–4.6)
LYMPHOCYTES NFR BLD: 35 % (ref 13–44)
MCH RBC QN AUTO: 33.7 PG (ref 26.1–32.9)
MCHC RBC AUTO-ENTMCNC: 33.1 G/DL (ref 31.4–35)
MCV RBC AUTO: 101.9 FL (ref 82–102)
MONOCYTES # BLD: 0.4 K/UL (ref 0.1–1.3)
MONOCYTES NFR BLD: 7 % (ref 4–12)
NEUTS SEG # BLD: 3.5 K/UL (ref 1.7–8.2)
NEUTS SEG NFR BLD: 56 % (ref 43–78)
NRBC # BLD: 0 K/UL (ref 0–0.2)
PLATELET # BLD AUTO: 311 K/UL (ref 150–450)
PMV BLD AUTO: 9.4 FL (ref 9.4–12.3)
POTASSIUM SERPL-SCNC: 3.8 MMOL/L (ref 3.5–5.1)
PROT SERPL-MCNC: 6.7 G/DL (ref 6.3–8.2)
RBC # BLD AUTO: 3.77 M/UL (ref 4.05–5.2)
SODIUM SERPL-SCNC: 140 MMOL/L (ref 136–145)
TRIGL SERPL-MCNC: 72 MG/DL (ref 0–150)
TSH, 3RD GENERATION: 2.58 UIU/ML (ref 0.27–4.2)
WBC # BLD AUTO: 6.3 K/UL (ref 4.3–11.1)

## 2024-05-02 ENCOUNTER — OFFICE VISIT (OUTPATIENT)
Dept: FAMILY MEDICINE CLINIC | Facility: CLINIC | Age: 64
End: 2024-05-02
Payer: MEDICAID

## 2024-05-02 VITALS
TEMPERATURE: 97 F | OXYGEN SATURATION: 95 % | HEART RATE: 106 BPM | SYSTOLIC BLOOD PRESSURE: 142 MMHG | WEIGHT: 156 LBS | BODY MASS INDEX: 28.71 KG/M2 | HEIGHT: 62 IN | DIASTOLIC BLOOD PRESSURE: 80 MMHG

## 2024-05-02 DIAGNOSIS — E78.2 MIXED HYPERLIPIDEMIA: ICD-10-CM

## 2024-05-02 DIAGNOSIS — Z86.711 HISTORY OF PULMONARY EMBOLISM: ICD-10-CM

## 2024-05-02 DIAGNOSIS — F32.A DEPRESSION, UNSPECIFIED DEPRESSION TYPE: ICD-10-CM

## 2024-05-02 DIAGNOSIS — R73.03 PREDIABETES: ICD-10-CM

## 2024-05-02 DIAGNOSIS — E66.3 OVERWEIGHT (BMI 25.0-29.9): ICD-10-CM

## 2024-05-02 DIAGNOSIS — E03.9 ACQUIRED HYPOTHYROIDISM: Primary | ICD-10-CM

## 2024-05-02 DIAGNOSIS — I10 ESSENTIAL (PRIMARY) HYPERTENSION: ICD-10-CM

## 2024-05-02 PROCEDURE — 3077F SYST BP >= 140 MM HG: CPT | Performed by: STUDENT IN AN ORGANIZED HEALTH CARE EDUCATION/TRAINING PROGRAM

## 2024-05-02 PROCEDURE — 3079F DIAST BP 80-89 MM HG: CPT | Performed by: STUDENT IN AN ORGANIZED HEALTH CARE EDUCATION/TRAINING PROGRAM

## 2024-05-02 PROCEDURE — 99214 OFFICE O/P EST MOD 30 MIN: CPT | Performed by: STUDENT IN AN ORGANIZED HEALTH CARE EDUCATION/TRAINING PROGRAM

## 2024-05-02 RX ORDER — CHLORAL HYDRATE 500 MG
CAPSULE ORAL
Qty: 120 CAPSULE | Refills: 5 | Status: SHIPPED | OUTPATIENT
Start: 2024-05-02

## 2024-05-02 RX ORDER — ZINC OXIDE 13 %
CREAM (GRAM) TOPICAL
Qty: 30 CAPSULE | Refills: 5 | Status: SHIPPED | OUTPATIENT
Start: 2024-05-02

## 2024-05-02 RX ORDER — DIPHENHYDRAMINE HCL 25 MG
25 CAPSULE ORAL EVERY 6 HOURS PRN
Qty: 120 CAPSULE | Refills: 3 | Status: SHIPPED | OUTPATIENT
Start: 2024-05-02

## 2024-05-02 NOTE — PROGRESS NOTES
[facility]  [address] [facility]  [place] [address]  Phone [phone]  Fax:  [phone]    Mayda Dyer (:  1960) is a 63 y.o. female here for evaluation of the following chief complaint(s):  Follow-up (Lab work /Refills/)    Assessment & Plan   ASSESSMENT/PLAN:  1. Acquired hypothyroidism  -     TSH with Reflex; Future  2. Essential (primary) hypertension  -     CBC with Auto Differential; Future  -     Comprehensive Metabolic Panel; Future  3. Mixed hyperlipidemia  -     Lipid Panel; Future  4. Prediabetes  -     Hemoglobin A1C; Future  -     Semaglutide-Weight Management (WEGOVY) 0.25 MG/0.5ML SOAJ SC injection; Inject 0.25 mg into the skin every 7 days, Disp-2 mL, R-0Normal  5. Depression, unspecified depression type  6. History of pulmonary embolism  7. Overweight (BMI 25.0-29.9)  -     Semaglutide-Weight Management (WEGOVY) 0.25 MG/0.5ML SOAJ SC injection; Inject 0.25 mg into the skin every 7 days, Disp-2 mL, R-0Normal    Blood pressure borderline high today, continue amlodipine and HCTZ.  History of prediabetes, recent hemoglobin A1c 5.9.  CMP and CBC unremarkable.  Recent lipid panel fairly stable, continue Lipitor for HLD.  Continue Wellbutrin and Cymbalta for depression.  TSH normal, continue Synthroid 50 mcg daily for hypothyroidism.  On Xarelto for history of PE.    Patient requesting Wegovy to help with weight loss.  Prescription sent in to see if insurance will cover.     Followed by pulmonology for COPD and lung nodules.  Followed by pain management for chronic back/neck pain, on oxycodone.     Normal Pap smear 2018, advised patient to come back for repeat Pap smear screening.  Negative mammogram 10/2022, advised to repeat screening.  Colonoscopy 2019 with poor prep, patient needs repeat screening colonoscopy.      Return in about 6 months (around 2024) for routine f/u, labs prior.         Subjective   SUBJECTIVE/OBJECTIVE:  HPI  63-year-old female with

## 2024-05-14 RX ORDER — LANOLIN ALCOHOL/MO/W.PET/CERES
0.4 CREAM (GRAM) TOPICAL DAILY
Qty: 30 TABLET | Refills: 5 | Status: SHIPPED | OUTPATIENT
Start: 2024-05-14

## 2024-05-14 RX ORDER — BACLOFEN 10 MG/1
10 TABLET ORAL 3 TIMES DAILY
Qty: 90 TABLET | Refills: 5 | Status: SHIPPED | OUTPATIENT
Start: 2024-05-14

## 2024-05-14 RX ORDER — FLUTICASONE PROPIONATE 50 MCG
SPRAY, SUSPENSION (ML) NASAL
Qty: 1 EACH | Refills: 5 | Status: SHIPPED | OUTPATIENT
Start: 2024-05-14

## 2024-05-14 RX ORDER — GABAPENTIN 300 MG/1
600 CAPSULE ORAL 2 TIMES DAILY
Qty: 120 CAPSULE | Refills: 5 | Status: SHIPPED | OUTPATIENT
Start: 2024-05-14 | End: 2024-11-10

## 2024-05-14 RX ORDER — POTASSIUM CHLORIDE 750 MG/1
TABLET, EXTENDED RELEASE ORAL
Qty: 240 TABLET | Refills: 5 | Status: SHIPPED | OUTPATIENT
Start: 2024-05-14

## 2024-05-14 RX ORDER — ATORVASTATIN CALCIUM 20 MG/1
20 TABLET, FILM COATED ORAL DAILY
Qty: 30 TABLET | Refills: 5 | Status: SHIPPED | OUTPATIENT
Start: 2024-05-14

## 2024-05-14 RX ORDER — DULOXETIN HYDROCHLORIDE 60 MG/1
60 CAPSULE, DELAYED RELEASE ORAL DAILY
Qty: 30 CAPSULE | Refills: 5 | Status: SHIPPED | OUTPATIENT
Start: 2024-05-14

## 2024-05-14 RX ORDER — BUPROPION HYDROCHLORIDE 300 MG/1
300 TABLET ORAL EVERY MORNING
Qty: 30 TABLET | Refills: 5 | Status: SHIPPED | OUTPATIENT
Start: 2024-05-14

## 2024-05-14 RX ORDER — OMEPRAZOLE 40 MG/1
40 CAPSULE, DELAYED RELEASE ORAL DAILY
Qty: 30 CAPSULE | Refills: 5 | Status: SHIPPED | OUTPATIENT
Start: 2024-05-14

## 2024-05-14 RX ORDER — LEVOTHYROXINE SODIUM 50 MCG
50 TABLET ORAL DAILY
Qty: 30 TABLET | Refills: 5 | Status: SHIPPED | OUTPATIENT
Start: 2024-05-14

## 2024-05-14 RX ORDER — HYDROCHLOROTHIAZIDE 25 MG/1
25 TABLET ORAL DAILY
Qty: 30 TABLET | Refills: 5 | Status: SHIPPED | OUTPATIENT
Start: 2024-05-14

## 2024-05-14 RX ORDER — AMLODIPINE BESYLATE 5 MG/1
5 TABLET ORAL DAILY
Qty: 30 TABLET | Refills: 5 | Status: SHIPPED | OUTPATIENT
Start: 2024-05-14

## 2024-05-14 RX ORDER — ASCORBIC ACID 500 MG
TABLET ORAL
Qty: 60 TABLET | Refills: 5 | Status: SHIPPED | OUTPATIENT
Start: 2024-05-14

## 2024-05-20 RX ORDER — POLYETHYLENE GLYCOL 3350 17 G/17G
POWDER ORAL
Qty: 510 G | Refills: 5 | Status: SHIPPED | OUTPATIENT
Start: 2024-05-20

## 2024-05-20 RX ORDER — CETIRIZINE HYDROCHLORIDE 10 MG/1
TABLET ORAL
Qty: 30 TABLET | Refills: 5 | Status: SHIPPED | OUTPATIENT
Start: 2024-05-20

## 2024-05-20 RX ORDER — PREDNISONE 2.5 MG/1
2.5 TABLET ORAL DAILY
Qty: 30 TABLET | Refills: 0 | OUTPATIENT
Start: 2024-05-20

## 2024-05-20 NOTE — TELEPHONE ENCOUNTER
Reviewed patient's chart and based on Dr. Valerio's previous note it states:        Patient had received previous prescription back in April.  I tried contacting the patient to inquire this further but received her voicemail.  Will try calling back again later. // Viviana Jacobsen CCMA

## 2024-05-21 ENCOUNTER — HOSPITAL ENCOUNTER (OUTPATIENT)
Dept: CT IMAGING | Age: 64
Discharge: HOME | End: 2024-05-24
Attending: INTERNAL MEDICINE
Payer: MEDICAID

## 2024-05-21 DIAGNOSIS — J44.1 CHRONIC OBSTRUCTIVE PULMONARY DISEASE WITH (ACUTE) EXACERBATION (HCC): ICD-10-CM

## 2024-05-21 DIAGNOSIS — Z87.891 PERSONAL HISTORY OF TOBACCO USE, PRESENTING HAZARDS TO HEALTH: ICD-10-CM

## 2024-05-21 DIAGNOSIS — R91.1 LUNG NODULE: ICD-10-CM

## 2024-05-21 PROCEDURE — 71250 CT THORAX DX C-: CPT

## 2024-05-24 RX ORDER — PREDNISONE 2.5 MG/1
2.5 TABLET ORAL DAILY
Qty: 30 TABLET | Refills: 0 | Status: SHIPPED | OUTPATIENT
Start: 2024-05-24 | End: 2024-06-23

## 2024-06-13 RX ORDER — CHLORAL HYDRATE 500 MG
CAPSULE ORAL
Qty: 120 CAPSULE | Refills: 5 | Status: SHIPPED | OUTPATIENT
Start: 2024-06-13

## 2024-06-17 RX ORDER — DIPHENHYDRAMINE HCL 25 MG
25 CAPSULE ORAL EVERY 6 HOURS PRN
Qty: 120 CAPSULE | Refills: 3 | Status: CANCELLED | OUTPATIENT
Start: 2024-06-17

## 2024-06-17 NOTE — TELEPHONE ENCOUNTER
Patient called the refill line asking for Benadryl. Asking for this to be sent to [facility]. JOSE PEREZ MA

## 2024-06-18 ENCOUNTER — TELEPHONE (OUTPATIENT)
Dept: FAMILY MEDICINE CLINIC | Facility: CLINIC | Age: 64
End: 2024-06-18

## 2024-06-18 RX ORDER — DIPHENHYDRAMINE HCL 25 MG
25 CAPSULE ORAL EVERY 6 HOURS PRN
Qty: 120 CAPSULE | Refills: 3 | Status: SHIPPED | OUTPATIENT
Start: 2024-06-18

## 2024-06-18 RX ORDER — PREDNISONE 2.5 MG/1
2.5 TABLET ORAL DAILY
Qty: 30 TABLET | Refills: 0 | Status: SHIPPED | OUTPATIENT
Start: 2024-06-18

## 2024-06-18 RX ORDER — CHLORAL HYDRATE 500 MG
CAPSULE ORAL
Qty: 120 CAPSULE | Refills: 5 | OUTPATIENT
Start: 2024-06-18

## 2024-06-18 NOTE — TELEPHONE ENCOUNTER
Left patient message via [email or web address].  I also notified her that I will be sending her a message via [employer] and she is welcome to respond via phone or [employer] [place] [employer].. // Viviana Jacobsen M.A.

## 2024-06-18 NOTE — TELEPHONE ENCOUNTER
States need Pa for the Wegovy    Refill on to [facility]    Baclofen 10 mg  Biotin  she states takes 3x aday and needs to be 1000 mg

## 2024-06-18 NOTE — TELEPHONE ENCOUNTER
Patient Refill Request     Last Office Visit: 04/22/2024 with Dr. Valerio     When they were supposed to follow up: 6 months    Upcoming Office Visit: None     Refills Requested: Prednisone 2.5 mg     Type of refill: 30 day     Requested Pharmacy: [facility]     Is prescription pended? Yes

## 2024-06-19 ENCOUNTER — TELEPHONE (OUTPATIENT)
Dept: FAMILY MEDICINE CLINIC | Facility: CLINIC | Age: 64
End: 2024-06-19

## 2024-06-19 RX ORDER — LANOLIN ALCOHOL/MO/W.PET/CERES
1 CREAM (GRAM) TOPICAL DAILY
Qty: 90 TABLET | Refills: 1 | Status: SHIPPED | OUTPATIENT
Start: 2024-06-19

## 2024-06-19 RX ORDER — BACLOFEN 10 MG/1
10 TABLET ORAL 3 TIMES DAILY
Qty: 90 TABLET | Refills: 5 | Status: SHIPPED | OUTPATIENT
Start: 2024-06-19

## 2024-06-19 NOTE — TELEPHONE ENCOUNTER
Mayda Dyer (Key: [id number])  Wegovy 0.25MG/0.5ML auto-injectors  Status: Sent To PlanCreated: June 19th, 2024    Awaiting ins decision.

## 2024-06-21 RX ORDER — FLUTICASONE FUROATE AND VILANTEROL 200; 25 UG/1; UG/1
POWDER RESPIRATORY (INHALATION)
Qty: 1 EACH | Refills: 4 | Status: SHIPPED | OUTPATIENT
Start: 2024-06-21

## 2024-07-12 RX ORDER — ASCORBIC ACID 500 MG
TABLET ORAL
Qty: 60 TABLET | Refills: 5 | Status: SHIPPED | OUTPATIENT
Start: 2024-07-12

## 2024-07-12 RX ORDER — BUPROPION HYDROCHLORIDE 300 MG/1
300 TABLET ORAL EVERY MORNING
Qty: 30 TABLET | Refills: 5 | Status: SHIPPED | OUTPATIENT
Start: 2024-07-12

## 2024-07-12 RX ORDER — GABAPENTIN 300 MG/1
600 CAPSULE ORAL 2 TIMES DAILY
Qty: 120 CAPSULE | Refills: 5 | Status: SHIPPED | OUTPATIENT
Start: 2024-07-12 | End: 2025-01-08

## 2024-07-12 RX ORDER — BACLOFEN 10 MG/1
10 TABLET ORAL 3 TIMES DAILY
Qty: 90 TABLET | Refills: 5 | Status: SHIPPED | OUTPATIENT
Start: 2024-07-12

## 2024-07-12 RX ORDER — OMEPRAZOLE 40 MG/1
40 CAPSULE, DELAYED RELEASE ORAL DAILY
Qty: 30 CAPSULE | Refills: 5 | Status: SHIPPED | OUTPATIENT
Start: 2024-07-12

## 2024-07-12 RX ORDER — LANOLIN ALCOHOL/MO/W.PET/CERES
0.4 CREAM (GRAM) TOPICAL DAILY
Qty: 30 TABLET | Refills: 5 | Status: SHIPPED | OUTPATIENT
Start: 2024-07-12

## 2024-08-13 ENCOUNTER — TELEPHONE (OUTPATIENT)
Dept: FAMILY MEDICINE CLINIC | Facility: CLINIC | Age: 64
End: 2024-08-13

## 2024-08-13 NOTE — TELEPHONE ENCOUNTER
[facility]      States need med for her stomach probiotic    Change the Biotin to 1000 tab she takes 5000 daily may need to call her or the pharamcy

## 2024-08-14 DIAGNOSIS — R14.0 ABDOMINAL BLOATING: Primary | ICD-10-CM

## 2024-08-14 RX ORDER — ZINC OXIDE 13 %
CREAM (GRAM) TOPICAL
Qty: 30 CAPSULE | Refills: 5 | Status: SHIPPED | OUTPATIENT
Start: 2024-08-14

## 2024-08-14 RX ORDER — BIOTIN 1 MG
TABLET ORAL
Qty: 30 TABLET | Refills: 5 | Status: SHIPPED | OUTPATIENT
Start: 2024-08-14

## 2024-08-23 ENCOUNTER — TELEPHONE (OUTPATIENT)
Dept: FAMILY MEDICINE CLINIC | Facility: CLINIC | Age: 64
End: 2024-08-23

## 2024-08-23 RX ORDER — SEMAGLUTIDE 1.34 MG/ML
INJECTION, SOLUTION SUBCUTANEOUS
Qty: 3 ADJUSTABLE DOSE PRE-FILLED PEN SYRINGE | Refills: 0 | Status: SHIPPED | OUTPATIENT
Start: 2024-08-23 | End: 2024-12-13

## 2024-08-23 NOTE — TELEPHONE ENCOUNTER
Pt states that ins aixa NOT cover the Wegovy but will cover the Ozempic. Pt would like med sent in.

## 2024-09-05 ENCOUNTER — TELEPHONE (OUTPATIENT)
Dept: FAMILY MEDICINE CLINIC | Facility: CLINIC | Age: 64
End: 2024-09-05

## 2024-09-11 DIAGNOSIS — J44.1 CHRONIC OBSTRUCTIVE PULMONARY DISEASE WITH (ACUTE) EXACERBATION (HCC): ICD-10-CM

## 2024-09-11 RX ORDER — ALBUTEROL SULFATE 90 UG/1
2 AEROSOL, METERED RESPIRATORY (INHALATION) EVERY 6 HOURS PRN
Qty: 1 EACH | Refills: 11 | Status: SHIPPED | OUTPATIENT
Start: 2024-09-11

## 2024-09-16 RX ORDER — RIVAROXABAN 20 MG/1
20 TABLET, FILM COATED ORAL DAILY
Qty: 30 TABLET | Refills: 5 | Status: SHIPPED | OUTPATIENT
Start: 2024-09-16

## 2024-09-19 DIAGNOSIS — J44.1 CHRONIC OBSTRUCTIVE PULMONARY DISEASE WITH (ACUTE) EXACERBATION (HCC): ICD-10-CM

## 2024-09-19 RX ORDER — GUAIFENESIN 600 MG/1
600 TABLET, EXTENDED RELEASE ORAL 2 TIMES DAILY
Qty: 60 TABLET | Refills: 11 | Status: SHIPPED | OUTPATIENT
Start: 2024-09-19

## 2024-10-16 ENCOUNTER — TELEPHONE (OUTPATIENT)
Dept: FAMILY MEDICINE CLINIC | Facility: CLINIC | Age: 64
End: 2024-10-16

## 2024-10-16 RX ORDER — DIPHENHYDRAMINE HYDROCHLORIDE 25 MG/1
CAPSULE ORAL
Qty: 120 CAPSULE | Refills: 3 | Status: SHIPPED | OUTPATIENT
Start: 2024-10-16

## 2024-10-16 RX ORDER — IPRATROPIUM BROMIDE 21 UG/1
SPRAY, METERED NASAL
Qty: 30 ML | Refills: 5 | Status: SHIPPED | OUTPATIENT
Start: 2024-10-16

## 2024-10-17 RX ORDER — CETIRIZINE HYDROCHLORIDE 10 MG/1
10 TABLET ORAL DAILY
Qty: 30 TABLET | Refills: 5 | Status: SHIPPED | OUTPATIENT
Start: 2024-10-17

## 2024-10-17 RX ORDER — POLYETHYLENE GLYCOL 3350 17 G/17G
POWDER ORAL
Qty: 510 G | Refills: 5 | Status: SHIPPED | OUTPATIENT
Start: 2024-10-17

## 2024-10-17 RX ORDER — CHLORAL HYDRATE 500 MG
CAPSULE ORAL
Qty: 120 CAPSULE | Refills: 5 | Status: SHIPPED | OUTPATIENT
Start: 2024-10-17

## 2024-11-05 ENCOUNTER — TELEMEDICINE (OUTPATIENT)
Dept: FAMILY MEDICINE CLINIC | Facility: CLINIC | Age: 64
End: 2024-11-05

## 2024-11-05 DIAGNOSIS — R05.1 ACUTE COUGH: Primary | ICD-10-CM

## 2024-11-05 DIAGNOSIS — J21.9 ACUTE BRONCHIOLITIS DUE TO UNSPECIFIED ORGANISM: ICD-10-CM

## 2024-11-05 RX ORDER — POTASSIUM CHLORIDE 1500 MG/1
TABLET, EXTENDED RELEASE ORAL
COMMUNITY

## 2024-11-05 RX ORDER — DOXYCYCLINE HYCLATE 100 MG
100 TABLET ORAL 2 TIMES DAILY
Qty: 14 TABLET | Refills: 0 | Status: SHIPPED | OUTPATIENT
Start: 2024-11-05 | End: 2024-11-12

## 2024-11-05 RX ORDER — BUPRENORPHINE 5 UG/H
PATCH, EXTENDED RELEASE TRANSDERMAL
COMMUNITY
Start: 2024-08-16

## 2024-11-05 RX ORDER — BENZONATATE 100 MG/1
100 CAPSULE ORAL 3 TIMES DAILY PRN
Qty: 30 CAPSULE | Refills: 0 | Status: SHIPPED | OUTPATIENT
Start: 2024-11-05 | End: 2024-11-15

## 2024-11-05 RX ORDER — MORPHINE SULFATE 15 MG/1
15 TABLET, FILM COATED, EXTENDED RELEASE ORAL 2 TIMES DAILY
COMMUNITY
Start: 2024-11-04 | End: 2024-12-04

## 2024-11-05 RX ORDER — PREDNISONE 20 MG/1
20 TABLET ORAL DAILY
Qty: 5 TABLET | Refills: 0 | Status: SHIPPED | OUTPATIENT
Start: 2024-11-05 | End: 2024-11-10

## 2024-11-05 RX ORDER — GUAIFENESIN 600 MG/1
1200 TABLET, EXTENDED RELEASE ORAL 2 TIMES DAILY
Qty: 40 TABLET | Refills: 0 | Status: SHIPPED | OUTPATIENT
Start: 2024-11-05 | End: 2024-11-15

## 2024-11-05 SDOH — ECONOMIC STABILITY: FOOD INSECURITY: WITHIN THE PAST 12 MONTHS, YOU WORRIED THAT YOUR FOOD WOULD RUN OUT BEFORE YOU GOT MONEY TO BUY MORE.: NEVER TRUE

## 2024-11-05 SDOH — ECONOMIC STABILITY: FOOD INSECURITY: WITHIN THE PAST 12 MONTHS, THE FOOD YOU BOUGHT JUST DIDN'T LAST AND YOU DIDN'T HAVE MONEY TO GET MORE.: NEVER TRUE

## 2024-11-05 SDOH — ECONOMIC STABILITY: INCOME INSECURITY: HOW HARD IS IT FOR YOU TO PAY FOR THE VERY BASICS LIKE FOOD, HOUSING, MEDICAL CARE, AND HEATING?: NOT HARD AT ALL

## 2024-11-05 ASSESSMENT — PATIENT HEALTH QUESTIONNAIRE - PHQ9
SUM OF ALL RESPONSES TO PHQ QUESTIONS 1-9: 2
SUM OF ALL RESPONSES TO PHQ QUESTIONS 1-9: 2
SUM OF ALL RESPONSES TO PHQ9 QUESTIONS 1 & 2: 2
2. FEELING DOWN, DEPRESSED OR HOPELESS: SEVERAL DAYS
SUM OF ALL RESPONSES TO PHQ QUESTIONS 1-9: 2
SUM OF ALL RESPONSES TO PHQ QUESTIONS 1-9: 2
1. LITTLE INTEREST OR PLEASURE IN DOING THINGS: SEVERAL DAYS

## 2024-11-05 ASSESSMENT — ENCOUNTER SYMPTOMS
RHINORRHEA: 0
CHEST TIGHTNESS: 0
SHORTNESS OF BREATH: 1
APNEA: 0
WHEEZING: 1
HEARTBURN: 0
HEMOPTYSIS: 0
SORE THROAT: 0
COUGH: 1

## 2024-11-05 NOTE — ASSESSMENT & PLAN NOTE
Acute condition, recurrent, Supportive care with appropriate antipyretics and fluids.  Educational material distributed and questions answered.  F/U for reevaluation and chest x-ray if worsening symptoms or not improved    Mucinex 600 mg , take 2 tablets (1200 mg )  by mouth twice a day for 7 days .   Prednisone 20 mg, take 1 tablet by mouth daily for 5 days   Tessalon 100 mg, take 1 capsule per mouth 3 times a day as needed

## 2024-11-05 NOTE — PROGRESS NOTES
Subjective   The patient is pleasant 63 years old female patient of Dr. Wilson who presented for virtual visit for following concerns    Cough  This is a recurrent problem. The current episode started 1 to 4 weeks ago (2 weeks). The problem has been gradually worsening. The problem occurs every few minutes. The cough is Productive of sputum (dry at times). Associated symptoms include ear congestion, a fever (low grade per patient), headaches, shortness of breath and wheezing. Pertinent negatives include no chest pain, chills, ear pain (Ear congestion), heartburn, hemoptysis, myalgias, nasal congestion, postnasal drip, rash, rhinorrhea, sore throat, sweats or weight loss. Nothing aggravates the symptoms. She has tried steroid inhaler and a beta-agonist inhaler (Mucinex 600 mg twice daily) for the symptoms. The treatment provided mild relief. Her past medical history is significant for COPD.     Review of Systems   Constitutional:  Positive for fever (low grade per patient). Negative for chills and weight loss.   HENT:  Negative for congestion, ear pain (Ear congestion), postnasal drip, rhinorrhea and sore throat.    Respiratory:  Positive for cough, shortness of breath and wheezing. Negative for apnea, hemoptysis and chest tightness.    Cardiovascular:  Positive for leg swelling. Negative for chest pain and palpitations.   Gastrointestinal:  Negative for heartburn.   Musculoskeletal:  Negative for myalgias.   Skin:  Negative for rash.   Neurological:  Positive for headaches. Negative for dizziness, tremors, seizures, syncope, facial asymmetry, speech difficulty, weakness, light-headedness and numbness.          Objective   Patient-Reported Vitals  Patient-Reported Temperature: 98.4  Patient-Reported Weight: 143lb  Patient-Reported Height: 5'2\"       Physical Exam  Constitutional:       Appearance: Normal appearance.   HENT:      Head: Normocephalic and atraumatic.      Nose: Nose normal. No congestion or

## 2024-11-15 RX ORDER — POLYETHYLENE GLYCOL 3350 17 G/17G
POWDER ORAL
Qty: 510 G | Refills: 5 | Status: SHIPPED | OUTPATIENT
Start: 2024-11-15

## 2024-11-15 RX ORDER — CETIRIZINE HYDROCHLORIDE 10 MG/1
TABLET ORAL
Qty: 30 TABLET | Refills: 5 | Status: SHIPPED | OUTPATIENT
Start: 2024-11-15

## 2024-11-22 ENCOUNTER — TELEPHONE (OUTPATIENT)
Dept: FAMILY MEDICINE CLINIC | Facility: CLINIC | Age: 64
End: 2024-11-22

## 2024-11-22 ENCOUNTER — TELEPHONE (OUTPATIENT)
Dept: PRIMARY CARE CLINIC | Facility: CLINIC | Age: 64
End: 2024-11-22

## 2024-11-22 NOTE — TELEPHONE ENCOUNTER
Received on-call message r/t JULIOCESAR. Was seen by ATTILA Parra on 11/05/2024. Received benzonatate, guaifenesin, doxycycline, prednisone. Reports cough, fever of 99.8. She is requesting another round of abx. Reports that she called this AM and was supposed to receive a callback from Amina. She was not aware that Augmentin has already been sent to the pharmacy by Dr Wilson today at 4:09 pm. She is now aware of Rx and location is [facility]. Agreeable to take abx sent in. No other concerns reported    Received message at 4:33 PM.  Contacted patient via M[employer] at 4:38 PM.

## 2024-11-22 NOTE — TELEPHONE ENCOUNTER
Patient saw Shahida on VV when she was sick 11/05/24 & started medicine on 11/06/24. Says she finished the med on the 11/12/24. Started feeling better but now feeling bad again. Requesting another round of antibiotics. Is normally a Wilson patient.     Coughing yellowish green stuff up, congestion, wheezing, ears are clogged up.

## 2024-12-02 ENCOUNTER — TELEPHONE (OUTPATIENT)
Dept: FAMILY MEDICINE CLINIC | Facility: CLINIC | Age: 64
End: 2024-12-02

## 2024-12-16 RX ORDER — CHLORAL HYDRATE 500 MG
CAPSULE ORAL
Qty: 120 CAPSULE | Refills: 5 | Status: SHIPPED | OUTPATIENT
Start: 2024-12-16

## 2024-12-18 ENCOUNTER — OFFICE VISIT (OUTPATIENT)
Dept: FAMILY MEDICINE CLINIC | Facility: CLINIC | Age: 64
End: 2024-12-18
Payer: MEDICAID

## 2024-12-18 VITALS
TEMPERATURE: 98.1 F | HEIGHT: 62 IN | SYSTOLIC BLOOD PRESSURE: 97 MMHG | WEIGHT: 139.8 LBS | HEART RATE: 94 BPM | OXYGEN SATURATION: 93 % | DIASTOLIC BLOOD PRESSURE: 64 MMHG | BODY MASS INDEX: 25.73 KG/M2

## 2024-12-18 DIAGNOSIS — M25.472 ANKLE EDEMA, BILATERAL: Chronic | ICD-10-CM

## 2024-12-18 DIAGNOSIS — D64.9 ANEMIA, UNSPECIFIED TYPE: Chronic | ICD-10-CM

## 2024-12-18 DIAGNOSIS — R06.02 SOB (SHORTNESS OF BREATH): ICD-10-CM

## 2024-12-18 DIAGNOSIS — J44.1 COPD EXACERBATION (HCC): ICD-10-CM

## 2024-12-18 DIAGNOSIS — J44.9 CHRONIC OBSTRUCTIVE PULMONARY DISEASE, UNSPECIFIED COPD TYPE (HCC): Primary | Chronic | ICD-10-CM

## 2024-12-18 DIAGNOSIS — R06.02 SOB (SHORTNESS OF BREATH): Chronic | ICD-10-CM

## 2024-12-18 DIAGNOSIS — M25.471 ANKLE EDEMA, BILATERAL: Chronic | ICD-10-CM

## 2024-12-18 DIAGNOSIS — I83.893 VARICOSE VEINS OF BOTH LEGS WITH EDEMA: ICD-10-CM

## 2024-12-18 LAB — NT PRO BNP: 61 PG/ML (ref 0–125)

## 2024-12-18 PROCEDURE — 3078F DIAST BP <80 MM HG: CPT

## 2024-12-18 PROCEDURE — 3074F SYST BP LT 130 MM HG: CPT

## 2024-12-18 PROCEDURE — 99214 OFFICE O/P EST MOD 30 MIN: CPT

## 2024-12-18 RX ORDER — M-VIT,TX,IRON,MINS/CALC/FOLIC 27MG-0.4MG
1 TABLET ORAL DAILY
Qty: 30 TABLET | Refills: 11 | Status: SHIPPED | OUTPATIENT
Start: 2024-12-18 | End: 2025-12-18

## 2024-12-18 ASSESSMENT — ENCOUNTER SYMPTOMS: COUGH: 1

## 2024-12-18 NOTE — PROGRESS NOTES
[facility]  [address] [place] [address]  Phone [phone]  Fax:  [phone]    Patient: Mayda Dyer  YOB: 1960  Patient Age 64 y.o.  Patient sex: female  Medical Record:  [id number]  Visit Date: 12/19/24    [facility] Note     Chief Complaint   Patient presents with    Fever     100.5    Cough     Yellow colored  X over a month ago     Other     Did virtual with you & did antibiotics, then Steve gave her antibiotics, was getting better but now worse,   Chest x ray??      Shortness of Breath    Leg Swelling    Generalized Body Aches    Abdominal Pain       History of Present Illness:  Ms. Dyer is 64 years old female the patient of Dr. Wilson  who came to the office with c/o cough , shortness of breath, and intermittent bilateral ankle edema.  She reports cough being chronic for 3 months , she was treated with steroids once and antibiotics twice at [facility] office, reports no improvement .  She has a history of COPD takes albuterol inhaler as needed and Breo Ellipta daily, she is under  pulmonology care, supposed to have appointment 10/23/2024  but had to cancel it because he had them appointment conflict this date, she was scheduled for ophthalmologist appointment with [facility] also at the same time.  She did not reschedule appointment with pulmonologist and have not addressed her cough to pulmonologist yet.  She is concerned that she has congestive heart failure and her anemia is worsening .     Cough  This is a recurrent problem. The current episode started more than 1 month ago (3 months). The problem has been gradually worsening. The problem occurs every few minutes. The cough is Productive of sputum. Associated symptoms include shortness of breath and wheezing. Pertinent negatives include no chest pain, chills, ear congestion, ear pain, headaches, heartburn, hemoptysis, myalgias, nasal congestion, postnasal drip, rash, rhinorrhea, sore throat,

## 2024-12-19 ASSESSMENT — ENCOUNTER SYMPTOMS
SORE THROAT: 0
STRIDOR: 0
HEARTBURN: 0
HEMOPTYSIS: 0
WHEEZING: 1
RHINORRHEA: 0
CHEST TIGHTNESS: 0
SHORTNESS OF BREATH: 1
CHOKING: 0

## 2024-12-20 ENCOUNTER — TELEPHONE (OUTPATIENT)
Dept: PULMONOLOGY | Age: 64
End: 2024-12-20

## 2024-12-20 DIAGNOSIS — J44.1 CHRONIC OBSTRUCTIVE PULMONARY DISEASE WITH (ACUTE) EXACERBATION (HCC): ICD-10-CM

## 2024-12-20 RX ORDER — GUAIFENESIN 600 MG/1
600 TABLET, EXTENDED RELEASE ORAL 2 TIMES DAILY
Qty: 60 TABLET | Refills: 11 | Status: SHIPPED | OUTPATIENT
Start: 2024-12-20

## 2024-12-20 NOTE — TELEPHONE ENCOUNTER
Advised this refill will be done but any further refills of other medications will have to be seen in the office.  Scheduled for 1/14/24.     No

## 2024-12-20 NOTE — TELEPHONE ENCOUNTER
Patient says that she has Bibasilar Atelectosis . She says she is having some trouble breathing , she also said she needs a prescription for her         guaiFENesin (MUCINEX) 600 MG extended release tablet

## 2024-12-31 ENCOUNTER — OFFICE VISIT (OUTPATIENT)
Dept: FAMILY MEDICINE CLINIC | Facility: CLINIC | Age: 64
End: 2024-12-31
Payer: MEDICAID

## 2024-12-31 VITALS
WEIGHT: 138.13 LBS | BODY MASS INDEX: 25.42 KG/M2 | SYSTOLIC BLOOD PRESSURE: 97 MMHG | HEART RATE: 119 BPM | DIASTOLIC BLOOD PRESSURE: 64 MMHG | OXYGEN SATURATION: 97 % | HEIGHT: 62 IN | TEMPERATURE: 97.8 F

## 2024-12-31 DIAGNOSIS — Z86.711 HISTORY OF PULMONARY EMBOLISM: ICD-10-CM

## 2024-12-31 DIAGNOSIS — J44.9 CHRONIC OBSTRUCTIVE PULMONARY DISEASE, UNSPECIFIED COPD TYPE (HCC): ICD-10-CM

## 2024-12-31 DIAGNOSIS — R73.03 PREDIABETES: ICD-10-CM

## 2024-12-31 DIAGNOSIS — E78.2 MIXED HYPERLIPIDEMIA: ICD-10-CM

## 2024-12-31 DIAGNOSIS — I10 ESSENTIAL (PRIMARY) HYPERTENSION: ICD-10-CM

## 2024-12-31 DIAGNOSIS — E03.9 ACQUIRED HYPOTHYROIDISM: ICD-10-CM

## 2024-12-31 DIAGNOSIS — I10 ESSENTIAL (PRIMARY) HYPERTENSION: Primary | ICD-10-CM

## 2024-12-31 DIAGNOSIS — F32.A DEPRESSION, UNSPECIFIED DEPRESSION TYPE: ICD-10-CM

## 2024-12-31 LAB
ALBUMIN SERPL-MCNC: 3.7 G/DL (ref 3.2–4.6)
ALBUMIN/GLOB SERPL: 1.2 (ref 1–1.9)
ALP SERPL-CCNC: 89 U/L (ref 35–104)
ALT SERPL-CCNC: 17 U/L (ref 8–45)
ANION GAP SERPL CALC-SCNC: 12 MMOL/L (ref 7–16)
AST SERPL-CCNC: 28 U/L (ref 15–37)
BASOPHILS # BLD: 0 K/UL (ref 0–0.2)
BASOPHILS NFR BLD: 0 % (ref 0–2)
BILIRUB SERPL-MCNC: 0.5 MG/DL (ref 0–1.2)
BUN SERPL-MCNC: 11 MG/DL (ref 8–23)
CALCIUM SERPL-MCNC: 9.8 MG/DL (ref 8.8–10.2)
CHLORIDE SERPL-SCNC: 100 MMOL/L (ref 98–107)
CHOLEST SERPL-MCNC: 173 MG/DL (ref 0–200)
CO2 SERPL-SCNC: 28 MMOL/L (ref 20–29)
CREAT SERPL-MCNC: 1.07 MG/DL (ref 0.6–1.1)
DIFFERENTIAL METHOD BLD: ABNORMAL
EOSINOPHIL # BLD: 0.4 K/UL (ref 0–0.8)
EOSINOPHIL NFR BLD: 9 % (ref 0.5–7.8)
ERYTHROCYTE [DISTWIDTH] IN BLOOD BY AUTOMATED COUNT: 12.1 % (ref 11.9–14.6)
EST. AVERAGE GLUCOSE BLD GHB EST-MCNC: 123 MG/DL
GLOBULIN SER CALC-MCNC: 3.2 G/DL (ref 2.3–3.5)
GLUCOSE SERPL-MCNC: 162 MG/DL (ref 70–99)
HBA1C MFR BLD: 5.9 % (ref 0–5.6)
HCT VFR BLD AUTO: 41.1 % (ref 35.8–46.3)
HDLC SERPL-MCNC: 65 MG/DL (ref 40–60)
HDLC SERPL: 2.7 (ref 0–5)
HGB BLD-MCNC: 13.3 G/DL (ref 11.7–15.4)
IMM GRANULOCYTES # BLD AUTO: 0 K/UL (ref 0–0.5)
IMM GRANULOCYTES NFR BLD AUTO: 0 % (ref 0–5)
LDLC SERPL CALC-MCNC: 83 MG/DL (ref 0–100)
LYMPHOCYTES # BLD: 2.6 K/UL (ref 0.5–4.6)
LYMPHOCYTES NFR BLD: 54 % (ref 13–44)
MCH RBC QN AUTO: 31.6 PG (ref 26.1–32.9)
MCHC RBC AUTO-ENTMCNC: 32.4 G/DL (ref 31.4–35)
MCV RBC AUTO: 97.6 FL (ref 82–102)
MONOCYTES # BLD: 0.2 K/UL (ref 0.1–1.3)
MONOCYTES NFR BLD: 5 % (ref 4–12)
NEUTS SEG # BLD: 1.5 K/UL (ref 1.7–8.2)
NEUTS SEG NFR BLD: 32 % (ref 43–78)
NRBC # BLD: 0 K/UL (ref 0–0.2)
PLATELET # BLD AUTO: 347 K/UL (ref 150–450)
PMV BLD AUTO: 9.6 FL (ref 9.4–12.3)
POTASSIUM SERPL-SCNC: 3.4 MMOL/L (ref 3.5–5.1)
PROT SERPL-MCNC: 6.9 G/DL (ref 6.3–8.2)
RBC # BLD AUTO: 4.21 M/UL (ref 4.05–5.2)
SODIUM SERPL-SCNC: 139 MMOL/L (ref 136–145)
TRIGL SERPL-MCNC: 127 MG/DL (ref 0–150)
TSH W FREE THYROID IF ABNORMAL: 0.98 UIU/ML (ref 0.27–4.2)
VLDLC SERPL CALC-MCNC: 25 MG/DL (ref 6–23)
WBC # BLD AUTO: 4.7 K/UL (ref 4.3–11.1)

## 2024-12-31 PROCEDURE — 3078F DIAST BP <80 MM HG: CPT | Performed by: STUDENT IN AN ORGANIZED HEALTH CARE EDUCATION/TRAINING PROGRAM

## 2024-12-31 PROCEDURE — 99214 OFFICE O/P EST MOD 30 MIN: CPT | Performed by: STUDENT IN AN ORGANIZED HEALTH CARE EDUCATION/TRAINING PROGRAM

## 2024-12-31 PROCEDURE — 3074F SYST BP LT 130 MM HG: CPT | Performed by: STUDENT IN AN ORGANIZED HEALTH CARE EDUCATION/TRAINING PROGRAM

## 2024-12-31 RX ORDER — LEVOTHYROXINE SODIUM 50 MCG
50 TABLET ORAL DAILY
Qty: 30 TABLET | Refills: 5 | Status: SHIPPED | OUTPATIENT
Start: 2024-12-31

## 2024-12-31 RX ORDER — ASCORBIC ACID 500 MG
TABLET ORAL
Qty: 60 TABLET | Refills: 5 | Status: SHIPPED | OUTPATIENT
Start: 2024-12-31

## 2024-12-31 RX ORDER — FOLIC ACID 0.4 MG
0.4 TABLET ORAL DAILY
Qty: 30 TABLET | Refills: 5 | Status: SHIPPED | OUTPATIENT
Start: 2024-12-31

## 2024-12-31 RX ORDER — DULOXETIN HYDROCHLORIDE 60 MG/1
60 CAPSULE, DELAYED RELEASE ORAL DAILY
Qty: 30 CAPSULE | Refills: 5 | Status: SHIPPED | OUTPATIENT
Start: 2024-12-31

## 2024-12-31 RX ORDER — OMEPRAZOLE 40 MG/1
40 CAPSULE, DELAYED RELEASE ORAL DAILY
Qty: 30 CAPSULE | Refills: 5 | Status: SHIPPED | OUTPATIENT
Start: 2024-12-31

## 2024-12-31 RX ORDER — BUPROPION HYDROCHLORIDE 300 MG/1
300 TABLET ORAL EVERY MORNING
Qty: 30 TABLET | Refills: 5 | Status: SHIPPED | OUTPATIENT
Start: 2024-12-31

## 2024-12-31 RX ORDER — IPRATROPIUM BROMIDE 21 UG/1
SPRAY, METERED NASAL
Qty: 30 ML | Refills: 5 | Status: SHIPPED | OUTPATIENT
Start: 2024-12-31

## 2024-12-31 RX ORDER — CHLORAL HYDRATE 500 MG
CAPSULE ORAL
Qty: 120 CAPSULE | Refills: 5 | Status: SHIPPED | OUTPATIENT
Start: 2024-12-31

## 2024-12-31 RX ORDER — BACLOFEN 10 MG/1
10 TABLET ORAL 3 TIMES DAILY
Qty: 90 TABLET | Refills: 5 | Status: SHIPPED | OUTPATIENT
Start: 2024-12-31

## 2024-12-31 RX ORDER — POTASSIUM CHLORIDE 750 MG/1
TABLET, EXTENDED RELEASE ORAL
Qty: 240 TABLET | Refills: 5 | Status: SHIPPED | OUTPATIENT
Start: 2024-12-31

## 2024-12-31 RX ORDER — GABAPENTIN 300 MG/1
600 CAPSULE ORAL 2 TIMES DAILY
Qty: 120 CAPSULE | Refills: 5 | Status: SHIPPED | OUTPATIENT
Start: 2024-12-31 | End: 2025-06-29

## 2024-12-31 RX ORDER — MORPHINE SULFATE 15 MG/1
15 TABLET, FILM COATED, EXTENDED RELEASE ORAL 2 TIMES DAILY
COMMUNITY
Start: 2024-12-17 | End: 2025-01-16

## 2024-12-31 RX ORDER — ATORVASTATIN CALCIUM 20 MG/1
20 TABLET, FILM COATED ORAL DAILY
Qty: 30 TABLET | Refills: 5 | Status: SHIPPED | OUTPATIENT
Start: 2024-12-31

## 2024-12-31 RX ORDER — FLUTICASONE PROPIONATE 50 MCG
SPRAY, SUSPENSION (ML) NASAL
Qty: 1 EACH | Refills: 5 | Status: SHIPPED | OUTPATIENT
Start: 2024-12-31

## 2024-12-31 RX ORDER — HYDROCHLOROTHIAZIDE 25 MG/1
25 TABLET ORAL DAILY
Qty: 30 TABLET | Refills: 5 | Status: SHIPPED | OUTPATIENT
Start: 2024-12-31

## 2024-12-31 NOTE — PROGRESS NOTES
[facility]  [address]  [place] [address]  Phone [phone]  Fax:  [phone]    Mayda Dyer (:  1960) is a 64 y.o. female here for evaluation of the following chief complaint(s):  Follow-up (8 month, feeling fine)    Assessment & Plan   ASSESSMENT/PLAN:  1. Essential (primary) hypertension  2. Depression, unspecified depression type  -     buPROPion (WELLBUTRIN XL) 300 MG extended release tablet; Take 1 tablet by mouth every morning, Disp-30 tablet, R-5Normal  -     DULoxetine (CYMBALTA) 60 MG extended release capsule; Take 1 capsule by mouth daily, Disp-30 capsule, R-5Normal  3. Mixed hyperlipidemia  -     atorvastatin (LIPITOR) 20 MG tablet; Take 1 tablet by mouth daily, Disp-30 tablet, R-5Normal  4. Chronic obstructive pulmonary disease, unspecified COPD type (HCC)  5. Acquired hypothyroidism  6. Prediabetes  7. History of pulmonary embolism    Check labs today.  Blood pressure low normal today, continue HCTZ and stop Amlodipine.  Advised patient to monitor BP at home.  Heart rate elevated today at 119, regular rhythm.  Advised patient to monitor heart rate at home and let me know if elevated.  History of prediabetes, check hemoglobin A1c.  Continue Lipitor for HLD.  Continue Wellbutrin and Cymbalta for depression.  Continue Synthroid 50 mcg daily for hypothyroidism.  On Xarelto for history of PE.     Followed by pulmonology for COPD and lung nodules, on Breo Ellipta.  Followed by pain management for chronic back/neck pain, on oxycodone and morphine.  Also on baclofen 3 times daily for her chronic back pain and gabapentin for lower extremity neuropathy.     Normal Pap smear 2018, have advised patient to come back for repeat Pap smear screening.  Negative mammogram 10/2022, advised to repeat screening.  Colonoscopy 2019 with poor prep, patient needs repeat screening colonoscopy.      Return in about 6 months (around 2025) for routine f/u.         Subjective

## 2025-01-14 ENCOUNTER — TELEPHONE (OUTPATIENT)
Dept: FAMILY MEDICINE CLINIC | Facility: CLINIC | Age: 65
End: 2025-01-14

## 2025-01-14 ENCOUNTER — OFFICE VISIT (OUTPATIENT)
Dept: PULMONOLOGY | Age: 65
End: 2025-01-14
Payer: MEDICAID

## 2025-01-14 VITALS
SYSTOLIC BLOOD PRESSURE: 123 MMHG | HEART RATE: 109 BPM | RESPIRATION RATE: 20 BRPM | TEMPERATURE: 98 F | OXYGEN SATURATION: 97 % | DIASTOLIC BLOOD PRESSURE: 70 MMHG | BODY MASS INDEX: 24.66 KG/M2 | WEIGHT: 134 LBS | HEIGHT: 62 IN

## 2025-01-14 DIAGNOSIS — R91.1 LUNG NODULE: ICD-10-CM

## 2025-01-14 DIAGNOSIS — Z86.711 PERSONAL HISTORY OF PULMONARY EMBOLISM: ICD-10-CM

## 2025-01-14 DIAGNOSIS — D68.51 FACTOR V LEIDEN MUTATION (HCC): ICD-10-CM

## 2025-01-14 DIAGNOSIS — Z87.891 PERSONAL HISTORY OF TOBACCO USE, PRESENTING HAZARDS TO HEALTH: Primary | ICD-10-CM

## 2025-01-14 DIAGNOSIS — Z86.718 HISTORY OF DVT (DEEP VEIN THROMBOSIS): ICD-10-CM

## 2025-01-14 DIAGNOSIS — J44.1 CHRONIC OBSTRUCTIVE PULMONARY DISEASE WITH (ACUTE) EXACERBATION (HCC): ICD-10-CM

## 2025-01-14 PROCEDURE — 3078F DIAST BP <80 MM HG: CPT | Performed by: INTERNAL MEDICINE

## 2025-01-14 PROCEDURE — 3074F SYST BP LT 130 MM HG: CPT | Performed by: INTERNAL MEDICINE

## 2025-01-14 PROCEDURE — 99214 OFFICE O/P EST MOD 30 MIN: CPT | Performed by: INTERNAL MEDICINE

## 2025-01-14 RX ORDER — ALBUTEROL SULFATE 90 UG/1
2 INHALANT RESPIRATORY (INHALATION) EVERY 6 HOURS PRN
Qty: 1 EACH | Refills: 11 | Status: SHIPPED | OUTPATIENT
Start: 2025-01-14

## 2025-01-14 RX ORDER — GUAIFENESIN 600 MG/1
600 TABLET, EXTENDED RELEASE ORAL 2 TIMES DAILY
Qty: 60 TABLET | Refills: 11 | Status: SHIPPED | OUTPATIENT
Start: 2025-01-14

## 2025-01-14 RX ORDER — PREDNISONE 20 MG/1
40 TABLET ORAL DAILY
Qty: 10 TABLET | Refills: 0 | Status: SHIPPED | OUTPATIENT
Start: 2025-01-14 | End: 2025-01-19

## 2025-01-14 RX ORDER — IPRATROPIUM BROMIDE AND ALBUTEROL SULFATE 2.5; .5 MG/3ML; MG/3ML
SOLUTION RESPIRATORY (INHALATION)
Qty: 360 ML | Refills: 1 | Status: SHIPPED | OUTPATIENT
Start: 2025-01-14

## 2025-01-14 RX ORDER — FLUTICASONE FUROATE AND VILANTEROL 200; 25 UG/1; UG/1
1 POWDER RESPIRATORY (INHALATION)
Qty: 1 EACH | Refills: 4 | Status: CANCELLED | OUTPATIENT
Start: 2025-01-14

## 2025-01-14 RX ORDER — FLUTICASONE FUROATE, UMECLIDINIUM BROMIDE AND VILANTEROL TRIFENATATE 200; 62.5; 25 UG/1; UG/1; UG/1
1 POWDER RESPIRATORY (INHALATION) DAILY
Qty: 1 EACH | Refills: 11 | Status: SHIPPED | OUTPATIENT
Start: 2025-01-14

## 2025-01-14 ASSESSMENT — COPD QUESTIONNAIRES
QUESTION8_ENERGYLEVEL: 4
QUESTION6_LEAVINGHOUSE: 0
QUESTION7_SLEEPQUALITY: 2
QUESTION3_CHESTTIGHTNESS: 1
QUESTION5_HOMEACTIVITIES: 0
QUESTION4_WALKINCLINE: 3
QUESTION2_CHESTPHLEGM: 2
CAT_TOTALSCORE: 16
QUESTION1_COUGHFREQUENCY: 4

## 2025-01-14 NOTE — TELEPHONE ENCOUNTER
Pt is requesting a letter so she does not have to attend jury duty due to her medical disabilities.

## 2025-01-14 NOTE — PROGRESS NOTES
EXAM: Body mass index is 24.51 kg/m².  Vitals:    01/14/25 1511   BP: 123/70   Pulse: (!) 109   Resp: 20   Temp: 98 °F (36.7 °C)   TempSrc: Temporal   SpO2: 97%   Weight: 60.8 kg (134 lb)   Height: 1.575 m (5' 2\")         General:   Alert, cooperative, no distress, appears stated age.        Eyes:   Conjunctivae/corneas clear. PERRL        Mouth/Throat:  Lips, mucosa, and tongue normal. Teeth and gums normal.        Lungs:     CTA B, no w/r/r but with wheezy sounding cough.      Heart:   Regular rate and rhythm, S1, S2 normal, no murmur, click, rub or gallop.     Abdomen:    Soft, non-tender.     Extremities:  Extremities normal, atraumatic, no cyanosis or edema.     Skin:  Skin color normal. No rashes or lesions     Neurologic:  A&Ox3     DIAGNOSTIC TESTS:                                                                                    LABS:   Lab Results   Component Value Date/Time    WBC 4.7 12/31/2024 04:08 PM    HGB 13.3 12/31/2024 04:08 PM    HCT 41.1 12/31/2024 04:08 PM     12/31/2024 04:08 PM    TSH 2.580 04/25/2024 03:38 PM     Imaging: I performed an independent interpretation of the patient's images.  CXR:   XR CHEST (2 VW) 12/18/2024    Narrative  Chest X-ray    INDICATION: Chronic obstructive pulmonary disease, unspecified    COMPARISON: 4/22/2024 CT.    TECHNIQUE: PA and lateral views of the chest were obtained.    FINDINGS: Mild bibasilar atelectasis versus scar. No consolidation. The lungs  are hyperaerated..  The heart size is normal.  The bony thorax is intact.    Impression  Hyperaerated lungs suggesting COPD.  Bibasilar atelectasis.      Electronically signed by Yasmany Steiner    4/22/24      CT Chest:   CT CHEST WO CONTRAST 05/21/2024    Narrative  CT CHEST    INDICATION: Follow-up nodules.    Multiple 2D axial images were obtained through the chest without IV contrast.  Radiation dose reduction techniques were used for this study:  All CT scans  performed at this facility use one or

## 2025-01-28 ENCOUNTER — TELEPHONE (OUTPATIENT)
Dept: PULMONOLOGY | Age: 65
End: 2025-01-28

## 2025-01-28 NOTE — TELEPHONE ENCOUNTER
[facility] CALL      Complaint: fever, ache   Cough: yes  Productive:  yes  Bloody Sputum:  no  Increased SOB/Wheezing:  yes  Duration: 1 days  Fever/Chills: yes  102.7  OTC Meds tried: PCP gave ABX, then had Prednisone by Dr. Valerio.     Has cough, fever, and       Has not taken anything OTC.     Pharmacy [facility]

## 2025-01-29 ENCOUNTER — TELEPHONE (OUTPATIENT)
Dept: PULMONOLOGY | Age: 65
End: 2025-01-29

## 2025-01-29 ENCOUNTER — TELEPHONE (OUTPATIENT)
Dept: FAMILY MEDICINE CLINIC | Facility: CLINIC | Age: 65
End: 2025-01-29

## 2025-01-29 NOTE — TELEPHONE ENCOUNTER
PA sent to [facility] via [employer]. KEY: [id number]    Your request has been denied  Patient must 1st have a trial and failure of 2 preferred drugs, the options are Advair Diskus and Symbicort.    Appeal has been faxed to [facility].(Patient has had a trial and failure of Breo, Anoro and Spiriva Respimat)

## 2025-01-29 NOTE — TELEPHONE ENCOUNTER
Last Seen: 01/14/25  COPD, history of former tobacco abuse, emphysema, DVT, PE, Lung Nodule, 2L O2 @ NOC    Patient called complaining of productive cough, fever, SOB and wheezing.   Called patient this morning and she stated that yesterday she began experiencing a productive cough with green mucus, sore throat that she thinks is from coughing so much, body aches, and fever as high as 102.7.  Patient has been taking tylenol for her fevers that helps but as soon as the tylenol wears off the fever returns.  Patient is taking Mucinex but otherwise no other OTC medications at this time.  I asked patient about the abx and prednisone that was mentioned in previous note and she said that she had a similar illness in November that her PCP had placed her on 2 rounds of abx for and that at her appointment earlier this month Dr. Valerio had given her some prednisone that she recently completed.  I explained to her that she could certainly try some OTC medications such as a Mucinex cold and cough medication, of course not taking the additional Mucinex at the same time to help with her symptoms but since she is experiencing the fever and body aches, it would be recommended that she go to an urgent care to be tested for a viral panel to rule out flu/covid/rsv, etc.  I did explain to patient that if it was viral, abx would not help to treat these issues and patient should refrain from going around anyone else if possible and mask when in public if needed to be out.  Also discussed using Duoneb nebulizer for cough and SOB and frequency as well as reminded patient not to use Albuterol MDI at the same time as nebulizer.  Asked patient about use of Trelegy and she stated that she hasn't started that yet due to prior auth still pending.  Informed patient that I would forward inquiry to appropriate staff to check on status.  Patient asked if she should continue using her Breo until she is able to start the Trelegy and I informed her to do

## 2025-02-10 ENCOUNTER — OFFICE VISIT (OUTPATIENT)
Dept: FAMILY MEDICINE CLINIC | Facility: CLINIC | Age: 65
End: 2025-02-10

## 2025-02-10 ENCOUNTER — TELEPHONE (OUTPATIENT)
Dept: FAMILY MEDICINE CLINIC | Facility: CLINIC | Age: 65
End: 2025-02-10

## 2025-02-10 VITALS
DIASTOLIC BLOOD PRESSURE: 59 MMHG | OXYGEN SATURATION: 97 % | SYSTOLIC BLOOD PRESSURE: 100 MMHG | HEART RATE: 117 BPM | HEIGHT: 62 IN | WEIGHT: 133.2 LBS | BODY MASS INDEX: 24.51 KG/M2

## 2025-02-10 DIAGNOSIS — J44.9 CHRONIC OBSTRUCTIVE PULMONARY DISEASE, UNSPECIFIED COPD TYPE (HCC): ICD-10-CM

## 2025-02-10 DIAGNOSIS — R05.9 COUGH, UNSPECIFIED TYPE: ICD-10-CM

## 2025-02-10 DIAGNOSIS — N81.4 CYSTOCELE WITH PROLAPSE: ICD-10-CM

## 2025-02-10 DIAGNOSIS — Z12.11 COLON CANCER SCREENING: ICD-10-CM

## 2025-02-10 DIAGNOSIS — J40 BRONCHITIS: ICD-10-CM

## 2025-02-10 DIAGNOSIS — R10.2 PELVIC PAIN: ICD-10-CM

## 2025-02-10 DIAGNOSIS — Z01.411 ENCOUNTER FOR GYNECOLOGICAL EXAMINATION WITH ABNORMAL FINDING: Primary | ICD-10-CM

## 2025-02-10 DIAGNOSIS — Z12.31 ENCOUNTER FOR SCREENING MAMMOGRAM FOR MALIGNANT NEOPLASM OF BREAST: ICD-10-CM

## 2025-02-10 RX ORDER — PREDNISONE 20 MG/1
20 TABLET ORAL 2 TIMES DAILY
Qty: 5 TABLET | Refills: 0 | Status: SHIPPED | OUTPATIENT
Start: 2025-02-10 | End: 2025-02-15

## 2025-02-10 RX ORDER — AZITHROMYCIN 250 MG/1
TABLET, FILM COATED ORAL
Qty: 6 TABLET | Refills: 0 | Status: SHIPPED | OUTPATIENT
Start: 2025-02-10 | End: 2025-02-20

## 2025-02-10 RX ORDER — BENZONATATE 100 MG/1
100 CAPSULE ORAL 3 TIMES DAILY PRN
Qty: 30 CAPSULE | Refills: 0 | Status: SHIPPED | OUTPATIENT
Start: 2025-02-10 | End: 2025-02-20

## 2025-02-10 RX ORDER — MORPHINE SULFATE 15 MG/1
15 TABLET, FILM COATED, EXTENDED RELEASE ORAL 2 TIMES DAILY
COMMUNITY
Start: 2025-01-16 | End: 2025-02-15

## 2025-02-10 SDOH — ECONOMIC STABILITY: FOOD INSECURITY: WITHIN THE PAST 12 MONTHS, YOU WORRIED THAT YOUR FOOD WOULD RUN OUT BEFORE YOU GOT MONEY TO BUY MORE.: NEVER TRUE

## 2025-02-10 SDOH — ECONOMIC STABILITY: FOOD INSECURITY: WITHIN THE PAST 12 MONTHS, THE FOOD YOU BOUGHT JUST DIDN'T LAST AND YOU DIDN'T HAVE MONEY TO GET MORE.: NEVER TRUE

## 2025-02-10 ASSESSMENT — ENCOUNTER SYMPTOMS
NAUSEA: 0
VOMITING: 0
CONSTIPATION: 1
SHORTNESS OF BREATH: 0
HEMOPTYSIS: 0
WHEEZING: 0
CHEST TIGHTNESS: 0
DIARRHEA: 0
RHINORRHEA: 0
ABDOMINAL PAIN: 0
COUGH: 1
SORE THROAT: 0
BACK PAIN: 1
HEARTBURN: 0

## 2025-02-10 ASSESSMENT — PATIENT HEALTH QUESTIONNAIRE - PHQ9
SUM OF ALL RESPONSES TO PHQ QUESTIONS 1-9: 7
SUM OF ALL RESPONSES TO PHQ QUESTIONS 1-9: 7
4. FEELING TIRED OR HAVING LITTLE ENERGY: MORE THAN HALF THE DAYS
SUM OF ALL RESPONSES TO PHQ9 QUESTIONS 1 & 2: 2
1. LITTLE INTEREST OR PLEASURE IN DOING THINGS: SEVERAL DAYS
8. MOVING OR SPEAKING SO SLOWLY THAT OTHER PEOPLE COULD HAVE NOTICED. OR THE OPPOSITE, BEING SO FIGETY OR RESTLESS THAT YOU HAVE BEEN MOVING AROUND A LOT MORE THAN USUAL: SEVERAL DAYS
6. FEELING BAD ABOUT YOURSELF - OR THAT YOU ARE A FAILURE OR HAVE LET YOURSELF OR YOUR FAMILY DOWN: SEVERAL DAYS
9. THOUGHTS THAT YOU WOULD BE BETTER OFF DEAD, OR OF HURTING YOURSELF: NOT AT ALL
3. TROUBLE FALLING OR STAYING ASLEEP: SEVERAL DAYS
7. TROUBLE CONCENTRATING ON THINGS, SUCH AS READING THE NEWSPAPER OR WATCHING TELEVISION: NOT AT ALL
SUM OF ALL RESPONSES TO PHQ QUESTIONS 1-9: 7
10. IF YOU CHECKED OFF ANY PROBLEMS, HOW DIFFICULT HAVE THESE PROBLEMS MADE IT FOR YOU TO DO YOUR WORK, TAKE CARE OF THINGS AT HOME, OR GET ALONG WITH OTHER PEOPLE: SOMEWHAT DIFFICULT
SUM OF ALL RESPONSES TO PHQ QUESTIONS 1-9: 7
5. POOR APPETITE OR OVEREATING: NOT AT ALL
2. FEELING DOWN, DEPRESSED OR HOPELESS: SEVERAL DAYS

## 2025-02-10 NOTE — PROGRESS NOTES
oxyCODONE HCl (OXY-IR) 10 MG immediate release tablet, every 8 hours as needed., Disp: , Rfl:     CALCIUM PO, Take by mouth, Disp: , Rfl:     ALPRAZolam (XANAX) 1 MG tablet, Take 1 tablet by mouth 3 times daily as needed., Disp: , Rfl:     lubiprostone (AMITIZA) 24 MCG capsule, TAKE 1 CAPSULE BY MOUTH TWICE DAILY WITH MEALS, Disp: , Rfl:     fluticasone-umeclidin-vilant (TRELEGY ELLIPTA) 200-62.5-25 MCG/ACT AEPB inhaler, Inhale 1 puff into the lungs daily (Patient not taking: Reported on 2/10/2025), Disp: 1 each, Rfl: 11    Cyanocobalamin ER 1000 MCG TBCR, Take 1 tablet by mouth daily (Patient not taking: Reported on 2024), Disp: 30 tablet, Rfl: 5    Current Problem List:   Patient Active Problem List   Diagnosis    Chronic pain syndrome    Acquired hypothyroidism    History of pulmonary embolism    Mixed hyperlipidemia    Former smoker    Irritable bowel syndrome with constipation    Gastroesophageal reflux disease    Anxiety    History of DVT (deep vein thrombosis)    TMJ syndrome    Depression    Fibromyalgia    History of fracture of wrist    Dry mouth    Dry eyes    Benign essential HTN    Thrush    Factor V Leiden mutation (HCC)    Hypercoagulable state (HCC)    Cough    Chronic obstructive pulmonary disease (HCC)    Osteoarthritis of multiple joints    Chronic constipation    Right leg pain       Social History:   Social History     Tobacco Use    Smoking status: Former     Current packs/day: 0.00     Average packs/day: 1 pack/day for 45.0 years (45.0 ttl pk-yrs)     Types: Cigarettes     Start date: 1971     Quit date: 2016     Years since quittin.4    Smokeless tobacco: Never    Tobacco comments:     Currently Vape   Substance Use Topics    Alcohol use: No     Alcohol/week: 0.0 standard drinks of alcohol       Family History:   Family History   Problem Relation Age of Onset    Clotting Disorder Mother     No Known Problems Father     No Known Problems Brother     Leukemia Maternal

## 2025-02-10 NOTE — TELEPHONE ENCOUNTER
Called & said you sent prednisone in for 2 times a day but you only sent 5 pills in. Can you send in the other 5 pills or send in a different script

## 2025-02-11 ENCOUNTER — TELEPHONE (OUTPATIENT)
Dept: FAMILY MEDICINE CLINIC | Facility: CLINIC | Age: 65
End: 2025-02-11

## 2025-02-11 NOTE — TELEPHONE ENCOUNTER
Pt has transferred care to [place]. Refills are current until 6/2025.   BP readings will be given to [place].

## 2025-02-13 NOTE — TELEPHONE ENCOUNTER
Appeal for [facility] has been approved and valid from 2/11/25-8/11/25. The authorization #[id number]

## 2025-02-17 RX ORDER — DIPHENHYDRAMINE HYDROCHLORIDE 25 MG/1
CAPSULE ORAL
Qty: 120 CAPSULE | Refills: 3 | OUTPATIENT
Start: 2025-02-17

## 2025-02-25 ENCOUNTER — TELEPHONE (OUTPATIENT)
Dept: FAMILY MEDICINE CLINIC | Facility: CLINIC | Age: 65
End: 2025-02-25

## 2025-02-25 NOTE — TELEPHONE ENCOUNTER
going have her u/s on Thursday.  Patient wants to see if we can rewrite the order to go to her rib cage. Having a stabbing pain in her left side that is from belly button to rib cage

## 2025-02-25 NOTE — TELEPHONE ENCOUNTER
Please ask where exactly she has a pain , if it is pelvic or low abdomen, she should be good with pelvic US. If different site, she needs appointment for evaluation.  can be virtual. Thank you

## 2025-02-26 ENCOUNTER — TELEMEDICINE (OUTPATIENT)
Dept: FAMILY MEDICINE CLINIC | Facility: CLINIC | Age: 65
End: 2025-02-26
Payer: MEDICAID

## 2025-02-26 DIAGNOSIS — Z12.11 SCREENING FOR COLON CANCER: ICD-10-CM

## 2025-02-26 DIAGNOSIS — R10.12 LEFT UPPER QUADRANT ABDOMINAL PAIN: ICD-10-CM

## 2025-02-26 DIAGNOSIS — K59.09 CHRONIC CONSTIPATION: Chronic | ICD-10-CM

## 2025-02-26 DIAGNOSIS — K58.1 IRRITABLE BOWEL SYNDROME WITH CONSTIPATION: Chronic | ICD-10-CM

## 2025-02-26 DIAGNOSIS — R10.33 PERIUMBILICAL ABDOMINAL PAIN: Primary | ICD-10-CM

## 2025-02-26 PROBLEM — Z87.891 FORMER SMOKER: Status: RESOLVED | Noted: 2017-04-04 | Resolved: 2025-02-26

## 2025-02-26 PROBLEM — B37.0 THRUSH: Status: RESOLVED | Noted: 2018-04-16 | Resolved: 2025-02-26

## 2025-02-26 PROBLEM — F11.90 CHRONIC, CONTINUOUS USE OF OPIOIDS: Status: ACTIVE | Noted: 2023-11-09

## 2025-02-26 PROBLEM — Z87.81 HISTORY OF FRACTURE OF WRIST: Status: RESOLVED | Noted: 2017-04-04 | Resolved: 2025-02-26

## 2025-02-26 PROBLEM — M26.629 TMJ SYNDROME: Status: RESOLVED | Noted: 2018-10-16 | Resolved: 2025-02-26

## 2025-02-26 PROBLEM — R07.89 ATYPICAL CHEST PAIN: Status: RESOLVED | Noted: 2019-09-04 | Resolved: 2025-02-26

## 2025-02-26 PROCEDURE — 99214 OFFICE O/P EST MOD 30 MIN: CPT

## 2025-02-26 RX ORDER — MORPHINE SULFATE 15 MG/1
15 TABLET, FILM COATED, EXTENDED RELEASE ORAL 2 TIMES DAILY
COMMUNITY
Start: 2025-02-16

## 2025-02-26 ASSESSMENT — ENCOUNTER SYMPTOMS
COUGH: 0
DIARRHEA: 0
CONSTIPATION: 0
SHORTNESS OF BREATH: 0
HEMATOCHEZIA: 0
CHEST TIGHTNESS: 0
VOMITING: 0
ABDOMINAL PAIN: 1
NAUSEA: 0
FLATUS: 0

## 2025-02-26 NOTE — PROGRESS NOTES
Mayda Dyer, was evaluated through a synchronous (real-time) audio-video encounter. The patient (or guardian if applicable) is aware that this is a billable service, which includes applicable co-pays. This Virtual Visit was conducted with patient's (and/or legal guardian's) consent. Patient identification was verified, and a caregiver was present when appropriate.   The patient was located at Home: [facility] [place] [address]  Provider was located at Facility (Appt Dept): [address] [place] [address]  Confirm you are appropriately licensed, registered, or certified to deliver care in the state where the patient is located as indicated above. If you are not or unsure, please re-schedule the visit: Yes, I confirm.     Mayda Dyer (:  1960) is a Established patient, presenting virtually for evaluation of the following:      Below is the assessment and plan developed based on review of pertinent history, physical exam, labs, studies, and medications.     Assessment & Plan  Periumbilical abdominal pain       Orders:    US ABDOMEN COMPLETE; Future    Left upper quadrant abdominal pain       Orders:    US ABDOMEN COMPLETE; Future    Screening for colon cancer       Or[facility]    Chronic constipation            Irritable bowel syndrome with constipation              No follow-ups on file.       Subjective   Ms. Dyer is pleasant 64 years old female who presented to the  for following concerns:     Abdominal Pain  This is a new problem. Episode onset: 4 days. The onset quality is sudden. The problem occurs intermittently. The problem has been waxing and waning. The pain is located in the RUQ (Above umbilicus). The pain is at a severity of 3/10. The pain is mild. The quality of the pain is dull and aching. The abdominal pain radiates to the periumbilical region and LUQ. Pertinent negatives include no

## 2025-03-10 ENCOUNTER — PREP FOR PROCEDURE (OUTPATIENT)
Dept: GASTROENTEROLOGY | Age: 65
End: 2025-03-10

## 2025-03-10 ENCOUNTER — TELEPHONE (OUTPATIENT)
Dept: GASTROENTEROLOGY | Age: 65
End: 2025-03-10

## 2025-03-10 DIAGNOSIS — Z12.11 COLON CANCER SCREENING: ICD-10-CM

## 2025-03-10 NOTE — TELEPHONE ENCOUNTER
Scheduled screening colonoscopy 4/1, pt aware, sent prep instructions via [employer]  Faxed medication clearance for patient to hold Xarelto prior to procedure to Dr. Néstor Valerio

## 2025-03-11 RX ORDER — SODIUM CHLORIDE 0.9 % (FLUSH) 0.9 %
5-40 SYRINGE (ML) INJECTION PRN
Status: CANCELLED | OUTPATIENT
Start: 2025-03-11

## 2025-03-11 RX ORDER — SODIUM CHLORIDE 9 MG/ML
25 INJECTION, SOLUTION INTRAVENOUS PRN
Status: CANCELLED | OUTPATIENT
Start: 2025-03-11

## 2025-03-11 RX ORDER — SODIUM CHLORIDE 0.9 % (FLUSH) 0.9 %
5-40 SYRINGE (ML) INJECTION EVERY 12 HOURS SCHEDULED
Status: CANCELLED | OUTPATIENT
Start: 2025-03-11

## 2025-03-12 ENCOUNTER — TELEPHONE (OUTPATIENT)
Dept: PULMONOLOGY | Age: 65
End: 2025-03-12

## 2025-03-12 NOTE — TELEPHONE ENCOUNTER
Received fax request to hold medication for procedure.  Please advise.    SURGICAL OR PROCEDURAL PULMONARY RISK ASSESSMENT:    Physician or Practice Requesting Clearance: Qamar HERNANDEZ  : Kylie LEE  Contact Phone: [phone]  Fax number: [phone]  Date of Surgery/Procedure: not listed  Type of Surgery or Procedure: colonoscopy  Type of Anesthesia: not listed  Anticipated Duration of Surgery: not listed    Asking to hold xarelto.

## 2025-03-26 NOTE — TELEPHONE ENCOUNTER
Ok to hold xarelto for 2 days for necessary procedures but length of time off therapy should be as short as possible and she should restart medication as quickly post procedure as the  feels is safe.     Néstor Valerio MD

## 2025-03-27 ENCOUNTER — TELEPHONE (OUTPATIENT)
Dept: PULMONOLOGY | Age: 65
End: 2025-03-27

## 2025-03-27 NOTE — TELEPHONE ENCOUNTER
Patient will be having colonoscopy on 3/31.  Wants to check and make sure she is not suppose to be on lovenox bridge for after the procedure

## 2025-03-27 NOTE — TELEPHONE ENCOUNTER
Last seen: 1/14/25  Hx: factor V leiden, s/p DVT & PE takes xarelto    Has colonoscopy 3/31, asking if needs lovenox bridge.  MD has already advised regarding plan for procedure. (See 3/12/25 telephone encounter).  Left msg for patient that lovenox was not indicated.

## 2025-03-27 NOTE — PERIOP NOTE
Patient verified name, , and procedure.    Type: 1a; abbreviated assessment per anesthesia guidelines    Labs per anesthesia: none    Instructed pt that they will be notified the day before their procedure by the GI Lab for time of arrival if their procedure is [place] and Pre-op for [place] cases. Arrival times should be called by 5 pm. If no phone is received the patient should contact their respective hospital. The GI lab telephone number is [phone] and ES Pre-op is [phone].     Follow diet and prep instructions per office. May have clear liquids up to 2 hours prior to hospital arrive time.      Bath or shower the night before and the am of surgery with non-moisturizing soap. No lotions, oils, powders, cologne on skin. No make up, eye make up or jewelry. Wear loose fitting comfortable, clean clothing.     Must have adult present in building the entire time .     Medications for the day of procedure Albuterol (Ventolin/ Pro-air) use and bring, Atorvastatin (Lipitor), baclofen (Lioresal), bupropion (Wellbutrin), Duloxetine (Cymbalta), Trelegy inhaler, gabapentin (Neurontin), morphine, Omeprazole (Prilosec), Oxycodone IR if needed, Levothyroxine (Synthroid),     Patient is on anticoagulant Xarelto. Instructed on the following. Your surgeon will advise you on if and/or when to hold. If you haven't already been given these instructions, you need to call the surgeon's office.        The following discharge instructions reviewed with patient: medication given during procedure may cause drowsiness for several hours, therefore, do not drive or operate machinery for remainder of the day. You may not drink alcohol on the day of your procedure, please resume regular diet and activity unless otherwise directed. You may experience abdominal distention for several hours that is relieved by the passage of gas. Contact your physician if you have any of the following: fever or chills, severe abdominal pain or excessive

## 2025-03-28 ENCOUNTER — TELEPHONE (OUTPATIENT)
Dept: PULMONOLOGY | Age: 65
End: 2025-03-28

## 2025-03-28 DIAGNOSIS — Z86.711 PERSONAL HISTORY OF PULMONARY EMBOLISM: Primary | ICD-10-CM

## 2025-03-28 DIAGNOSIS — Z86.718 HISTORY OF DVT (DEEP VEIN THROMBOSIS): ICD-10-CM

## 2025-03-28 RX ORDER — ENOXAPARIN SODIUM 100 MG/ML
60 INJECTION SUBCUTANEOUS 2 TIMES DAILY
Qty: 8 EACH | Refills: 0 | Status: SHIPPED | OUTPATIENT
Start: 2025-03-28 | End: 2025-04-01

## 2025-03-28 NOTE — TELEPHONE ENCOUNTER
Advised patient that Dr. Valerio reviewed record & did not recommend lovenox bridge.   She states in the past she has had lovenox bridge for colonoscopy. Concerned that there is too much risk for a fatal blood clot and has a lot of anxiety. Shares several instances with sharp chest pains, leg pains that she is concerned were symptoms of subsequent clots. Believes last colonoscopy was in 2019. Noted that she has some leftover injections that she doesn't want to use b/c they are . Encouraged to discard those. GI office has advised her to hold eliquis for 3 days instead of 2 as recommended by Dr. Valerio.   Recent accurate weight in system.     
If she is having those types of symptoms and would require a 3 day hold then would agree with bridge. Should check with GI when they are comfortable having her last lovenox injection but would hope she could take it the night before her procedure and just not take it the morning of. GI will have to direct that.   Will place rx for lovenox 60mg bid. X 4 days which should give her plenty.     Néstor Valerio MD    
Patient is calling back once again to ask for a Lovenox bridge for 3 days while she is off her eliquis  
Relayed MD response & patient voiced understanding.    
No

## 2025-03-31 ENCOUNTER — ANESTHESIA EVENT (OUTPATIENT)
Dept: ENDOSCOPY | Age: 65
End: 2025-03-31
Payer: MEDICAID

## 2025-03-31 RX ORDER — SODIUM CHLORIDE 9 MG/ML
INJECTION, SOLUTION INTRAVENOUS PRN
Status: CANCELLED | OUTPATIENT
Start: 2025-03-31

## 2025-03-31 RX ORDER — SODIUM CHLORIDE 0.9 % (FLUSH) 0.9 %
5-40 SYRINGE (ML) INJECTION EVERY 12 HOURS SCHEDULED
Status: CANCELLED | OUTPATIENT
Start: 2025-03-31

## 2025-03-31 RX ORDER — NALOXONE HYDROCHLORIDE 0.4 MG/ML
INJECTION, SOLUTION INTRAMUSCULAR; INTRAVENOUS; SUBCUTANEOUS PRN
Status: CANCELLED | OUTPATIENT
Start: 2025-03-31

## 2025-03-31 RX ORDER — SODIUM CHLORIDE 0.9 % (FLUSH) 0.9 %
5-40 SYRINGE (ML) INJECTION PRN
Status: CANCELLED | OUTPATIENT
Start: 2025-03-31

## 2025-03-31 RX ORDER — ONDANSETRON 2 MG/ML
4 INJECTION INTRAMUSCULAR; INTRAVENOUS
Status: CANCELLED | OUTPATIENT
Start: 2025-03-31

## 2025-03-31 NOTE — FLOWSHEET NOTE
03/31/25 1420   PAT Call/Visit Questions   Person Interviewed Mayda   Relationship to Patient Patient   Surgery Time Verified Yes  (1118)   Arrival Time Verified 0945   Arrival time prior to procedure 1.5 hours   Surgery Location Verified Yes   NPO Status Reinforced Yes   Ride and Caregiver Arranged Yes   Ride Caregiver Provider mother Nova

## 2025-04-01 ENCOUNTER — HOSPITAL ENCOUNTER (OUTPATIENT)
Age: 65
Discharge: HOME | End: 2025-04-01
Attending: STUDENT IN AN ORGANIZED HEALTH CARE EDUCATION/TRAINING PROGRAM | Admitting: STUDENT IN AN ORGANIZED HEALTH CARE EDUCATION/TRAINING PROGRAM
Payer: MEDICAID

## 2025-04-01 ENCOUNTER — ANESTHESIA (OUTPATIENT)
Dept: ENDOSCOPY | Age: 65
End: 2025-04-01
Payer: MEDICAID

## 2025-04-01 VITALS
HEIGHT: 62 IN | DIASTOLIC BLOOD PRESSURE: 71 MMHG | HEART RATE: 95 BPM | SYSTOLIC BLOOD PRESSURE: 127 MMHG | BODY MASS INDEX: 23.55 KG/M2 | WEIGHT: 128 LBS | RESPIRATION RATE: 10 BRPM | OXYGEN SATURATION: 93 % | TEMPERATURE: 97.7 F

## 2025-04-01 PROCEDURE — 3700000001 HC ADD 15 MINUTES (ANESTHESIA): Performed by: STUDENT IN AN ORGANIZED HEALTH CARE EDUCATION/TRAINING PROGRAM

## 2025-04-01 PROCEDURE — 2709999900 HC NON-CHARGEABLE SUPPLY: Performed by: STUDENT IN AN ORGANIZED HEALTH CARE EDUCATION/TRAINING PROGRAM

## 2025-04-01 PROCEDURE — 2580000003 HC RX 258: Performed by: ANESTHESIOLOGY

## 2025-04-01 PROCEDURE — 3609027000 HC COLONOSCOPY: Performed by: STUDENT IN AN ORGANIZED HEALTH CARE EDUCATION/TRAINING PROGRAM

## 2025-04-01 PROCEDURE — 7100000011 HC PHASE II RECOVERY - ADDTL 15 MIN: Performed by: STUDENT IN AN ORGANIZED HEALTH CARE EDUCATION/TRAINING PROGRAM

## 2025-04-01 PROCEDURE — 6360000002 HC RX W HCPCS: Performed by: ANESTHESIOLOGY

## 2025-04-01 PROCEDURE — 6360000002 HC RX W HCPCS: Performed by: NURSE ANESTHETIST, CERTIFIED REGISTERED

## 2025-04-01 PROCEDURE — 45378 DIAGNOSTIC COLONOSCOPY: CPT | Performed by: STUDENT IN AN ORGANIZED HEALTH CARE EDUCATION/TRAINING PROGRAM

## 2025-04-01 PROCEDURE — 7100000010 HC PHASE II RECOVERY - FIRST 15 MIN: Performed by: STUDENT IN AN ORGANIZED HEALTH CARE EDUCATION/TRAINING PROGRAM

## 2025-04-01 PROCEDURE — 3700000000 HC ANESTHESIA ATTENDED CARE: Performed by: STUDENT IN AN ORGANIZED HEALTH CARE EDUCATION/TRAINING PROGRAM

## 2025-04-01 RX ORDER — SODIUM CHLORIDE 0.9 % (FLUSH) 0.9 %
5-40 SYRINGE (ML) INJECTION PRN
Status: DISCONTINUED | OUTPATIENT
Start: 2025-04-01 | End: 2025-04-01 | Stop reason: HOSPADM

## 2025-04-01 RX ORDER — SODIUM CHLORIDE 0.9 % (FLUSH) 0.9 %
5-40 SYRINGE (ML) INJECTION EVERY 12 HOURS SCHEDULED
Status: DISCONTINUED | OUTPATIENT
Start: 2025-04-01 | End: 2025-04-01 | Stop reason: HOSPADM

## 2025-04-01 RX ORDER — SODIUM CHLORIDE 9 MG/ML
INJECTION, SOLUTION INTRAVENOUS PRN
Status: DISCONTINUED | OUTPATIENT
Start: 2025-04-01 | End: 2025-04-01 | Stop reason: HOSPADM

## 2025-04-01 RX ORDER — PROPOFOL 10 MG/ML
INJECTION, EMULSION INTRAVENOUS
Status: DISCONTINUED | OUTPATIENT
Start: 2025-04-01 | End: 2025-04-01 | Stop reason: SDUPTHER

## 2025-04-01 RX ORDER — SODIUM CHLORIDE 9 MG/ML
25 INJECTION, SOLUTION INTRAVENOUS PRN
Status: DISCONTINUED | OUTPATIENT
Start: 2025-04-01 | End: 2025-04-01 | Stop reason: HOSPADM

## 2025-04-01 RX ORDER — LIDOCAINE HYDROCHLORIDE 20 MG/ML
INJECTION, SOLUTION EPIDURAL; INFILTRATION; INTRACAUDAL; PERINEURAL
Status: DISCONTINUED | OUTPATIENT
Start: 2025-04-01 | End: 2025-04-01 | Stop reason: SDUPTHER

## 2025-04-01 RX ORDER — LIDOCAINE HYDROCHLORIDE 10 MG/ML
1 INJECTION, SOLUTION INFILTRATION; PERINEURAL
Status: DISCONTINUED | OUTPATIENT
Start: 2025-04-01 | End: 2025-04-01 | Stop reason: HOSPADM

## 2025-04-01 RX ORDER — SODIUM CHLORIDE, SODIUM LACTATE, POTASSIUM CHLORIDE, CALCIUM CHLORIDE 600; 310; 30; 20 MG/100ML; MG/100ML; MG/100ML; MG/100ML
INJECTION, SOLUTION INTRAVENOUS CONTINUOUS
Status: DISCONTINUED | OUTPATIENT
Start: 2025-04-01 | End: 2025-04-01 | Stop reason: HOSPADM

## 2025-04-01 RX ADMIN — PROPOFOL 140 MCG/KG/MIN: 10 INJECTION, EMULSION INTRAVENOUS at 12:22

## 2025-04-01 RX ADMIN — SODIUM CHLORIDE, SODIUM LACTATE, POTASSIUM CHLORIDE, AND CALCIUM CHLORIDE: .6; .31; .03; .02 INJECTION, SOLUTION INTRAVENOUS at 11:50

## 2025-04-01 RX ADMIN — FAMOTIDINE 20 MG: 10 INJECTION, SOLUTION INTRAVENOUS at 11:51

## 2025-04-01 RX ADMIN — PROPOFOL 50 MG: 10 INJECTION, EMULSION INTRAVENOUS at 12:30

## 2025-04-01 RX ADMIN — LIDOCAINE HYDROCHLORIDE 60 MG: 20 INJECTION, SOLUTION EPIDURAL; INFILTRATION; INTRACAUDAL; PERINEURAL at 12:21

## 2025-04-01 RX ADMIN — PROPOFOL 70 MG: 10 INJECTION, EMULSION INTRAVENOUS at 12:21

## 2025-04-01 ASSESSMENT — PAIN - FUNCTIONAL ASSESSMENT: PAIN_FUNCTIONAL_ASSESSMENT: 0-10

## 2025-04-01 ASSESSMENT — PAIN SCALES - GENERAL
PAINLEVEL_OUTOF10: 3

## 2025-04-01 ASSESSMENT — PAIN DESCRIPTION - ORIENTATION
ORIENTATION: LOWER;MID;POSTERIOR
ORIENTATION: MID;LOWER;POSTERIOR
ORIENTATION: MID;LOWER;POSTERIOR

## 2025-04-01 ASSESSMENT — PAIN DESCRIPTION - LOCATION
LOCATION: ABDOMEN

## 2025-04-01 ASSESSMENT — COPD QUESTIONNAIRES: CAT_SEVERITY: SEVERE

## 2025-04-01 ASSESSMENT — PAIN DESCRIPTION - DESCRIPTORS: DESCRIPTORS: PRESSURE

## 2025-04-01 NOTE — ANESTHESIA POSTPROCEDURE EVALUATION
Department of Anesthesiology  Postprocedure Note    Patient: Mayda Dyer  MRN: [id number]  YOB: 1960  Date of evaluation: 4/1/2025    Procedure Summary       Date: 04/01/25 Room / Location: [facility] 05 / [facility] ENDOSCOPY    Anesthesia Start: 1216 Anesthesia Stop: 1245    Procedure: COLORECTAL CANCER SCREENING, NOT HIGH RISK Diagnosis:       Colon cancer screening      (Colon cancer screening [Z12.11])    Surgeons: Gonzalez Thomas MD Responsible Provider: Jessica Jorge MD    Anesthesia Type: TIVA ASA Status: 3            Anesthesia Type: TIVA    Vu Phase I: Vu Score: 10    Vu Phase II:      Anesthesia Post Evaluation    Patient location during evaluation: PACU  Patient participation: complete - patient participated  Level of consciousness: awake and alert  Airway patency: patent  Nausea & Vomiting: no nausea  Cardiovascular status: hemodynamically stable  Respiratory status: acceptable  Hydration status: euvolemic  Pain management: adequate and satisfactory to patient        No notable events documented.

## 2025-04-01 NOTE — DISCHARGE INSTRUCTIONS
Gastrointestinal Colonoscopy/Flexible Sigmoidoscopy - Lower Exam Discharge Instructions    Call Dr. Thomas at [phone] for any problems or questions.    Contact the doctor’s office for follow up appointment as directed.    Medication may cause drowsiness for several hours, therefore:  Do not drive or operate machinery for reminder of the day.  No alcohol today.  Do not make any important or legal decisions for 24 hours.  Do not sign any legal documents for 24 hours.    Ordinarily, you may resume regular diet and activity after exam unless otherwise specified by your physician.    Because of air put into your colon during exam, you may experience some abdominal distension, relieved by the passage of gas, for several hours.    Contact your physician if you have any of the following:  Excessive amount of bleeding - large amount when having a bowel movement.  Occasional specks of blood with bowel movement would not be unusual.  Severe abdominal pain  Fever or Chills        Any additional instructions:  ***

## 2025-04-01 NOTE — ANESTHESIA PRE PROCEDURE
Department of Anesthesiology  Preprocedure Note       Name:  Mayda Dyer   Age:  64 y.o.  :  1960                                          MRN:  [id number]         Date:  2025      Surgeon: Surgeon(s):  Gonzalez Thomas MD    Procedure: Procedure(s):  COLORECTAL CANCER SCREENING, NOT HIGH RISK    Medications prior to admission:   Prior to Admission medications    Medication Sig Start Date End Date Taking? Authorizing Provider   morphine (MS CONTIN) 15 MG extended release tablet Take 1 tablet by mouth 2 times daily. 25  Yes Carlos Lux MD   guaiFENesin (MUCINEX) 600 MG extended release tablet Take 1 tablet by mouth 2 times daily 25  Yes Néstor Valerio MD   ipratropium 0.5 mg-albuterol 2.5 mg (DUONEB) 0.5-2.5 (3) MG/3ML SOLN nebulizer solution 1 vial via nebulizer 4 times daily as needed for shortness of breath or wheezing 25  Yes Néstor Valerio MD   rivaroxaban (XARELTO) 20 MG TABS tablet Take 1 tablet by mouth daily 25  Yes Néstor Valerio MD   fluticasone-umeclidin-vilant (TRELEGY ELLIPTA) 200-62.5-25 MCG/ACT AEPB inhaler Inhale 1 puff into the lungs daily 25  Yes Néstor Valerio MD   SYNTHROID 50 MCG tablet Take 1 tablet by mouth Daily 24  Yes Srinath Wilson MD   hydroCHLOROthiazide (HYDRODIURIL) 25 MG tablet Take 1 tablet by mouth daily 24  Yes Srinath Wilson MD   atorvastatin (LIPITOR) 20 MG tablet Take 1 tablet by mouth daily 24  Yes Srinath Wilson MD   baclofen (LIORESAL) 10 MG tablet Take 1 tablet by mouth 3 times daily 24  Yes Srinath Wilson MD   buPROPion (WELLBUTRIN XL) 300 MG extended release tablet Take 1 tablet by mouth every morning 24  Yes Srinath Wilson MD   DULoxetine (CYMBALTA) 60 MG extended release capsule Take 1 capsule by mouth daily 24  Yes Srinath Wilson MD   gabapentin (NEURONTIN) 300 MG capsule Take 2 capsules by mouth 2 times daily for 180 days. 24 Yes Steve,

## 2025-04-01 NOTE — H&P
PE:   There were no vitals filed for this visit.   General:  The patient appears well-nourished, and is in no acute distress.    Skin:  no rash, ulcers. No Bleeding or signs of infection.  HEENT:  Normocephalic, atraumatic. No sclerae icterus.   Neck:  No pain on palpation or mobilization of the neck.  Respiratory: Respiratory effort is normal. Expansion maintained bilaterally and symmetrically. Normal breath sounds and clear to auscultation bilaterally without wheezes, rales, or rhonchi.    Cardiovascular:  Regular rate and rhythm.     Abdomen:  Soft, non tender to palpation. No distention. Normoactive bowel sounds present.    Extremities: No edema bilaterally. No erythema  Neurologic:  Alert and oriented x3.  No sensory deficits. No asterixis  Psychiatric: Appropriate mood and affect.  Musculoskeletal: Strength and tone are symmetrical and maintained.    Gonzalez Thomas MD  [facility]

## 2025-04-08 ENCOUNTER — TELEPHONE (OUTPATIENT)
Dept: GASTROENTEROLOGY | Age: 65
End: 2025-04-08

## 2025-04-08 NOTE — TELEPHONE ENCOUNTER
JOHNM requesting patient return call to schedule a follow up visit with PA  for bloating per Dr. Thomas

## 2025-04-09 PROBLEM — Z12.11 COLON CANCER SCREENING: Status: RESOLVED | Noted: 2025-03-10 | Resolved: 2025-04-09

## 2025-04-30 ENCOUNTER — OFFICE VISIT (OUTPATIENT)
Dept: PULMONOLOGY | Age: 65
End: 2025-04-30
Payer: MEDICAID

## 2025-04-30 VITALS
RESPIRATION RATE: 20 BRPM | BODY MASS INDEX: 24.11 KG/M2 | TEMPERATURE: 98 F | DIASTOLIC BLOOD PRESSURE: 80 MMHG | HEIGHT: 62 IN | WEIGHT: 131 LBS | OXYGEN SATURATION: 99 % | SYSTOLIC BLOOD PRESSURE: 122 MMHG | HEART RATE: 109 BPM

## 2025-04-30 DIAGNOSIS — J44.1 CHRONIC OBSTRUCTIVE PULMONARY DISEASE WITH (ACUTE) EXACERBATION (HCC): ICD-10-CM

## 2025-04-30 DIAGNOSIS — R91.1 LUNG NODULE: ICD-10-CM

## 2025-04-30 DIAGNOSIS — R53.83 OTHER FATIGUE: ICD-10-CM

## 2025-04-30 DIAGNOSIS — D68.51 FACTOR V LEIDEN MUTATION: ICD-10-CM

## 2025-04-30 DIAGNOSIS — R05.3 CHRONIC COUGH: ICD-10-CM

## 2025-04-30 DIAGNOSIS — Z86.711 PERSONAL HISTORY OF PULMONARY EMBOLISM: ICD-10-CM

## 2025-04-30 DIAGNOSIS — Z86.718 HISTORY OF DVT (DEEP VEIN THROMBOSIS): Primary | ICD-10-CM

## 2025-04-30 PROCEDURE — 3079F DIAST BP 80-89 MM HG: CPT | Performed by: INTERNAL MEDICINE

## 2025-04-30 PROCEDURE — 99214 OFFICE O/P EST MOD 30 MIN: CPT | Performed by: INTERNAL MEDICINE

## 2025-04-30 PROCEDURE — 3074F SYST BP LT 130 MM HG: CPT | Performed by: INTERNAL MEDICINE

## 2025-04-30 PROCEDURE — G2211 COMPLEX E/M VISIT ADD ON: HCPCS | Performed by: INTERNAL MEDICINE

## 2025-04-30 RX ORDER — IPRATROPIUM BROMIDE AND ALBUTEROL SULFATE 2.5; .5 MG/3ML; MG/3ML
SOLUTION RESPIRATORY (INHALATION)
Qty: 360 ML | Refills: 1 | Status: SHIPPED | OUTPATIENT
Start: 2025-04-30

## 2025-04-30 RX ORDER — ALBUTEROL SULFATE 90 UG/1
2 INHALANT RESPIRATORY (INHALATION) EVERY 6 HOURS PRN
Qty: 1 EACH | Refills: 11 | Status: CANCELLED | OUTPATIENT
Start: 2025-04-30

## 2025-04-30 RX ORDER — GUAIFENESIN 600 MG/1
600 TABLET, EXTENDED RELEASE ORAL 2 TIMES DAILY
Qty: 30 TABLET | Refills: 11 | Status: SHIPPED | OUTPATIENT
Start: 2025-04-30

## 2025-04-30 RX ORDER — FLUTICASONE FUROATE, UMECLIDINIUM BROMIDE AND VILANTEROL TRIFENATATE 200; 62.5; 25 UG/1; UG/1; UG/1
1 POWDER RESPIRATORY (INHALATION) DAILY
Qty: 1 EACH | Refills: 11 | Status: SHIPPED | OUTPATIENT
Start: 2025-04-30

## 2025-04-30 RX ORDER — ALBUTEROL SULFATE 90 UG/1
2 INHALANT RESPIRATORY (INHALATION) EVERY 6 HOURS PRN
Qty: 18 G | Refills: 3 | Status: SHIPPED | OUTPATIENT
Start: 2025-04-30

## 2025-04-30 NOTE — PATIENT INSTRUCTIONS
How to collect a sputum sample  Your doctor or nurse will give you a special plastic cup for collecting your sputum. Follow these steps carefully:    --The cup is very clean. Don't open it until you are ready to use it.  --As soon as you wake up in the morning (before you eat or drink anything), brush your teeth and rinse your mouth with water. Do not use mouthwash.  --If possible, go outside or open a window before collecting the sputum sample. This helps protect other people from germs when you cough.  --Take a very deep breath and hold the air for 5 seconds. Slowly breathe out. Take another deep breath and cough hard until some sputum comes up into your mouth.  Spit the sputum into the plastic cup.  --Keep doing this until the sputum reaches the 5 ml line (or more) on the plastic cup. This is about 1 teaspoon of sputum.  --Screw the cap on the cup tightly so it doesn't leak.  --Write on the cup the date you collected the sputum.  --Put the cup into the box or bag the nurse gave you.  --Give the cup to the [facility]. You can store the cup in the refrigerator overnight if necessary. Do not put it in the freezer or leave it at room temperature.     Why is a sputum test necessary?    Your doctor wants to collect some of the sputum (\"phlegm\") that you cough up from your lungs.  The laboratory will test the sputum for tuberculosis (TB) germs.    Checking your sputum is the best way to find out if you have TB disease.  If you are already taking medicine for TB, checking your sputum is the best way to tell if the medicine is working.    To be sure the test is accurate, you must cough up sputum from deep inside your lungs.  Sputum from your lungs is usually thick and sticky.  Saliva comes from your mouth and is watery and thin.  Do not collect saliva.     Tip: If you cannot cough up sputum, try breathing steam from a hot shower or a pan of boiling water.

## 2025-04-30 NOTE — PROGRESS NOTES
Patient Name:  Mayda Dyer                             YOB: 1960  MRN: [id number]                                              Office Visit 4/30/2025    ASSESSMENT AND PLAN:  (Medical Decision Making)    Pt with history of former tobacco abuse, emphysema seen on CT scan. Factor V Leiden s/p DVT and PE. On lifelong anticoagulation. Tolerating this well.   Covid with prolonged illness.   CT scan with small but new B pulmonary nodules. Stable in February 2023 and May 2024.     Now reporting increasing productive cough and low-grade fevers with fatigue despite being on Trelegy 100.  Having to use albuterol relatively frequently as well.  Still not smoking.  Past CT scans have shown some small nodular changes suggestive of mucous plugging or low-grade infection.  Reviewed with her today that she may have either infection with bacteria or mycobacteria.  Alternatively this may simply be inflammatory in nature.    - Will obtain sputum cultures for routine bacteria and AFB.  - If these are positive we will treat accordingly.  Otherwise would consider starting Dupixent for her as she is already on maximal dose inhaled therapies and her last eosinophil count was 400.  - Refilled Trelegy and albuterol.  She states that Ventolin is the only type of albuterol inhaler she can physically use given arthritis issues.  - Due for repeat annual CT scan in May which was ordered.  - Continue 2 L of oxygen at night.  - She is using Mucinex twice a day.  Asked that this be prescribed as it has been covered in the past by her insurance.  - Continue Xarelto 20 mg a day planning for lifelong anticoagulation therapy with factor V Leiden and history of VTE in the past.  - Will repeat complete PFTs now as her last set did show some decline.    Follow up in 2 months.     Mayda was seen today for copd.    Diagnoses and all orders for this visit:    Chronic obstructive pulmonary disease with (acute) exacerbation

## 2025-05-16 ENCOUNTER — TELEPHONE (OUTPATIENT)
Dept: PULMONOLOGY | Age: 65
End: 2025-05-16

## 2025-05-16 RX ORDER — POLYETHYLENE GLYCOL 3350 17 G/17G
POWDER ORAL
Qty: 510 G | Refills: 5 | OUTPATIENT
Start: 2025-05-16

## 2025-05-16 RX ORDER — CETIRIZINE HYDROCHLORIDE 10 MG/1
TABLET ORAL
Qty: 30 TABLET | Refills: 5 | OUTPATIENT
Start: 2025-05-16

## 2025-05-16 NOTE — TELEPHONE ENCOUNTER
TRIAGE CALL      Complaint: Coughing/sore throat/fever  Cough: yes  Productive:  yes  Bloody Sputum:  no  Increased SOB/Wheezing:  yes  Duration: 2 days  Fever/Chills: yes 101  OTC Meds tried: Tylenol

## 2025-05-19 NOTE — TELEPHONE ENCOUNTER
Last seen: 4/30/25  Hx: emphysema, factor V Leiden w/ h/o PE & DVT    Patient call reporting coughing w/ sore throat & fever, left msg that we need to review current symptoms to determine if need msg to provider or work in appt.

## 2025-05-23 ENCOUNTER — TELEPHONE (OUTPATIENT)
Dept: PULMONOLOGY | Age: 65
End: 2025-05-23

## 2025-05-23 LAB
BACTERIA SPEC CULT: ABNORMAL
BACTERIA SPEC CULT: ABNORMAL
GRAM STN SPEC: ABNORMAL
SERVICE CMNT-IMP: ABNORMAL

## 2025-05-23 NOTE — TELEPHONE ENCOUNTER
Patient is wanting the results from the AFB Culture + smear . She says she believes there is an infection and may need antibiotic

## 2025-05-23 NOTE — TELEPHONE ENCOUNTER
Able to reach patient to review questions, she is asking if sputum specimens have resulted yet. Notes she continues to have nagging, productive cough of thick, dark green sputum; using nebulizer at least BID. Asking if she can have an antibiotic to help w/ the coughing. Confirmed local pharmacy.

## 2025-05-24 LAB
ACID FAST STN SPEC: NEGATIVE
SPECIMEN PREPARATION: NORMAL
SPECIMEN SOURCE: NORMAL

## 2025-05-26 LAB
ACID FAST STN SPEC: NEGATIVE
SPECIMEN PREPARATION: NORMAL
SPECIMEN SOURCE: NORMAL

## 2025-05-29 ENCOUNTER — CLINICAL SUPPORT (OUTPATIENT)
Dept: PULMONOLOGY | Age: 65
End: 2025-05-29
Payer: MEDICAID

## 2025-05-29 ENCOUNTER — HOSPITAL ENCOUNTER (OUTPATIENT)
Dept: CT IMAGING | Age: 65
Discharge: HOME | End: 2025-05-31
Attending: INTERNAL MEDICINE
Payer: MEDICAID

## 2025-05-29 DIAGNOSIS — J44.1 CHRONIC OBSTRUCTIVE PULMONARY DISEASE WITH (ACUTE) EXACERBATION (HCC): Primary | ICD-10-CM

## 2025-05-29 DIAGNOSIS — R91.1 LUNG NODULE: ICD-10-CM

## 2025-05-29 DIAGNOSIS — Z86.711 PERSONAL HISTORY OF PULMONARY EMBOLISM: ICD-10-CM

## 2025-05-29 LAB
FEF25-27, POC: 1.69 L/S
FET, POC: NORMAL
FEV 1 , POC: 2.23 L
FEV1/FVC, POC: NORMAL
FVC, POC: NORMAL
LUNG AGE, POC: NORMAL
PEF, POC: 4.72 L/S

## 2025-05-29 PROCEDURE — 94010 BREATHING CAPACITY TEST: CPT | Performed by: INTERNAL MEDICINE

## 2025-05-29 PROCEDURE — 71250 CT THORAX DX C-: CPT

## 2025-05-29 PROCEDURE — 94729 DIFFUSING CAPACITY: CPT | Performed by: INTERNAL MEDICINE

## 2025-05-29 PROCEDURE — 94726 PLETHYSMOGRAPHY LUNG VOLUMES: CPT | Performed by: INTERNAL MEDICINE

## 2025-06-04 ENCOUNTER — PATIENT MESSAGE (OUTPATIENT)
Dept: FAMILY MEDICINE CLINIC | Facility: CLINIC | Age: 65
End: 2025-06-04

## 2025-06-04 DIAGNOSIS — J44.1 CHRONIC OBSTRUCTIVE PULMONARY DISEASE WITH (ACUTE) EXACERBATION (HCC): ICD-10-CM

## 2025-06-06 DIAGNOSIS — I10 ESSENTIAL (PRIMARY) HYPERTENSION: ICD-10-CM

## 2025-06-06 DIAGNOSIS — R73.03 PREDIABETES: ICD-10-CM

## 2025-06-06 DIAGNOSIS — E78.2 MIXED HYPERLIPIDEMIA: Primary | ICD-10-CM

## 2025-06-06 DIAGNOSIS — E03.9 ACQUIRED HYPOTHYROIDISM: ICD-10-CM

## 2025-06-06 NOTE — TELEPHONE ENCOUNTER
Patient Refill Request     Last Office Visit: 04/30/2025 with Dr. Valerio     When they were supposed to follow up: 2 months     Upcoming Office Visit: 06/26/2025 with Dr. Valerio     Refills Requested: Duoneb 0.5 MG     Type of refill: 30 day     Requested Pharmacy: [facility]     Is prescription pended? Yes

## 2025-06-07 RX ORDER — IPRATROPIUM BROMIDE AND ALBUTEROL SULFATE 2.5; .5 MG/3ML; MG/3ML
1 SOLUTION RESPIRATORY (INHALATION) EVERY 4 HOURS
Qty: 120 EACH | Refills: 11 | Status: SHIPPED | OUTPATIENT
Start: 2025-06-07

## 2025-06-11 NOTE — TELEPHONE ENCOUNTER
Still waiting on final AFB results. I had not gotten her routine bacterial culture results but looks like there is some light growth of bacteria. Sending rx for augmentin to her listed pharmacy.     Néstor Valerio MD

## 2025-06-16 RX ORDER — DIPHENHYDRAMINE HYDROCHLORIDE 25 MG/1
CAPSULE ORAL
Qty: 120 CAPSULE | Refills: 3 | OUTPATIENT
Start: 2025-06-16

## 2025-06-19 ENCOUNTER — TELEPHONE (OUTPATIENT)
Dept: FAMILY MEDICINE CLINIC | Facility: CLINIC | Age: 65
End: 2025-06-19

## 2025-06-19 RX ORDER — BACLOFEN 10 MG/1
10 TABLET ORAL 3 TIMES DAILY
Qty: 90 TABLET | Refills: 5 | OUTPATIENT
Start: 2025-06-19

## 2025-06-19 RX ORDER — POTASSIUM CHLORIDE 750 MG/1
TABLET, EXTENDED RELEASE ORAL
Qty: 240 TABLET | Refills: 5 | OUTPATIENT
Start: 2025-06-19

## 2025-06-19 NOTE — TELEPHONE ENCOUNTER
Pharmacy  [id number] - [place], [place] - [address] [address]   Med refill  baclofen (LIORESAL) 10 MG tablet   potassium chloride (KLOR-CON M) 10 MEQ extended release tablet   may need pa

## 2025-06-24 RX ORDER — DIPHENHYDRAMINE HCL 25 MG
CAPSULE ORAL
Qty: 120 CAPSULE | Refills: 3 | OUTPATIENT
Start: 2025-06-24

## 2025-06-26 ENCOUNTER — TELEPHONE (OUTPATIENT)
Dept: PULMONOLOGY | Age: 65
End: 2025-06-26

## 2025-06-26 ENCOUNTER — OFFICE VISIT (OUTPATIENT)
Dept: PULMONOLOGY | Age: 65
End: 2025-06-26
Payer: MEDICAID

## 2025-06-26 VITALS
RESPIRATION RATE: 14 BRPM | DIASTOLIC BLOOD PRESSURE: 70 MMHG | BODY MASS INDEX: 24.84 KG/M2 | HEIGHT: 62 IN | SYSTOLIC BLOOD PRESSURE: 108 MMHG | HEART RATE: 91 BPM | OXYGEN SATURATION: 94 % | WEIGHT: 135 LBS

## 2025-06-26 DIAGNOSIS — D68.51 FACTOR V LEIDEN MUTATION: ICD-10-CM

## 2025-06-26 DIAGNOSIS — Z86.718 HISTORY OF DVT (DEEP VEIN THROMBOSIS): ICD-10-CM

## 2025-06-26 DIAGNOSIS — R91.1 LUNG NODULE: ICD-10-CM

## 2025-06-26 DIAGNOSIS — R94.2 DECREASED DIFFUSION CAPACITY: ICD-10-CM

## 2025-06-26 DIAGNOSIS — J44.1 CHRONIC OBSTRUCTIVE PULMONARY DISEASE WITH (ACUTE) EXACERBATION (HCC): Primary | ICD-10-CM

## 2025-06-26 DIAGNOSIS — J47.9 BRONCHIECTASIS WITHOUT COMPLICATION (HCC): ICD-10-CM

## 2025-06-26 DIAGNOSIS — Z87.891 PERSONAL HISTORY OF TOBACCO USE, PRESENTING HAZARDS TO HEALTH: ICD-10-CM

## 2025-06-26 DIAGNOSIS — Z86.711 PERSONAL HISTORY OF PULMONARY EMBOLISM: ICD-10-CM

## 2025-06-26 PROCEDURE — 3074F SYST BP LT 130 MM HG: CPT | Performed by: INTERNAL MEDICINE

## 2025-06-26 PROCEDURE — 99214 OFFICE O/P EST MOD 30 MIN: CPT | Performed by: INTERNAL MEDICINE

## 2025-06-26 PROCEDURE — G2211 COMPLEX E/M VISIT ADD ON: HCPCS | Performed by: INTERNAL MEDICINE

## 2025-06-26 PROCEDURE — 3078F DIAST BP <80 MM HG: CPT | Performed by: INTERNAL MEDICINE

## 2025-06-26 NOTE — PROGRESS NOTES
(OXY-IR) 10 MG immediate release tablet EVERY 8 HOURS PRN    OXYGEN 2.5 L, EVERY EVENING    polyethylene glycol (MIRALAX) 17 GM/SCOOP POWD powder TAKE 17 GRAMS (1 CAPFUL) DISSOLVED IN 4 TO 8 OZ OF WATER, JUICE, SODA, COFFEE OR TEA BY MOUTH ONCE DAILY AS NEEDED    potassium chloride (KLOR-CON M) 10 MEQ extended release tablet 4 in am and pm    rivaroxaban (XARELTO) 20 mg, Oral, DAILY    Synthroid 50 mcg, Oral, DAILY

## 2025-07-06 LAB
ACID FAST STN SPEC: NEGATIVE
MYCOBACTERIUM SPEC QL CULT: NEGATIVE
SPECIMEN PREPARATION: NORMAL
SPECIMEN SOURCE: NORMAL

## 2025-07-07 ENCOUNTER — OFFICE VISIT (OUTPATIENT)
Dept: FAMILY MEDICINE CLINIC | Facility: CLINIC | Age: 65
End: 2025-07-07
Payer: MEDICAID

## 2025-07-07 VITALS
BODY MASS INDEX: 24.11 KG/M2 | HEART RATE: 93 BPM | WEIGHT: 131 LBS | HEIGHT: 62 IN | OXYGEN SATURATION: 97 % | TEMPERATURE: 98.1 F | SYSTOLIC BLOOD PRESSURE: 121 MMHG | DIASTOLIC BLOOD PRESSURE: 72 MMHG

## 2025-07-07 DIAGNOSIS — I10 ESSENTIAL (PRIMARY) HYPERTENSION: ICD-10-CM

## 2025-07-07 DIAGNOSIS — E78.2 MIXED HYPERLIPIDEMIA: ICD-10-CM

## 2025-07-07 DIAGNOSIS — E03.9 ACQUIRED HYPOTHYROIDISM: ICD-10-CM

## 2025-07-07 DIAGNOSIS — L98.9 SKIN LESIONS: ICD-10-CM

## 2025-07-07 DIAGNOSIS — K21.9 GASTROESOPHAGEAL REFLUX DISEASE, UNSPECIFIED WHETHER ESOPHAGITIS PRESENT: ICD-10-CM

## 2025-07-07 DIAGNOSIS — Z76.0 MEDICATION REFILL: ICD-10-CM

## 2025-07-07 DIAGNOSIS — Z91.81 AT HIGH RISK FOR FALLS: ICD-10-CM

## 2025-07-07 DIAGNOSIS — L29.9 ITCHING: Chronic | ICD-10-CM

## 2025-07-07 DIAGNOSIS — M79.7 FIBROMYALGIA: Primary | ICD-10-CM

## 2025-07-07 DIAGNOSIS — F11.90 CHRONIC, CONTINUOUS USE OF OPIOIDS: Chronic | ICD-10-CM

## 2025-07-07 DIAGNOSIS — F32.A DEPRESSION, UNSPECIFIED DEPRESSION TYPE: ICD-10-CM

## 2025-07-07 DIAGNOSIS — R73.03 PREDIABETES: ICD-10-CM

## 2025-07-07 DIAGNOSIS — Z79.899 LONG TERM CURRENT USE OF DIURETIC: Chronic | ICD-10-CM

## 2025-07-07 DIAGNOSIS — D22.9 CHANGE IN COLOR OF SKIN MOLE: ICD-10-CM

## 2025-07-07 PROBLEM — R68.2 DRY MOUTH: Status: RESOLVED | Noted: 2018-04-16 | Resolved: 2025-07-07

## 2025-07-07 PROBLEM — M46.1 SACROILIITIS: Status: ACTIVE | Noted: 2025-07-07

## 2025-07-07 PROBLEM — Z86.0100 HISTORY OF COLON POLYPS: Status: RESOLVED | Noted: 2019-09-04 | Resolved: 2025-07-07

## 2025-07-07 PROBLEM — M47.817 LUMBOSACRAL SPONDYLOSIS WITHOUT MYELOPATHY: Status: ACTIVE | Noted: 2025-07-07

## 2025-07-07 PROBLEM — E66.3 OVERWEIGHT (BMI 25.0-29.9): Status: RESOLVED | Noted: 2019-11-26 | Resolved: 2025-07-07

## 2025-07-07 PROBLEM — M47.816 LUMBAR FACET ARTHROPATHY: Status: ACTIVE | Noted: 2023-11-09

## 2025-07-07 PROBLEM — M48.061 SPINAL STENOSIS OF LUMBAR REGION WITHOUT NEUROGENIC CLAUDICATION: Status: ACTIVE | Noted: 2023-11-09

## 2025-07-07 PROBLEM — R14.0 ABDOMINAL BLOATING: Status: ACTIVE | Noted: 2017-03-08

## 2025-07-07 PROBLEM — M24.28 LIGAMENTUM FLAVUM HYPERTROPHY: Status: ACTIVE | Noted: 2025-07-07

## 2025-07-07 PROBLEM — R13.10 DYSPHAGIA: Status: RESOLVED | Noted: 2017-03-08 | Resolved: 2025-07-07

## 2025-07-07 PROBLEM — M47.812 CERVICAL SPONDYLOSIS WITHOUT MYELOPATHY: Status: ACTIVE | Noted: 2025-07-07

## 2025-07-07 PROBLEM — R10.31 RIGHT LOWER QUADRANT ABDOMINAL PAIN: Status: ACTIVE | Noted: 2024-01-25

## 2025-07-07 PROBLEM — Z79.891 LONG TERM (CURRENT) USE OF OPIATE ANALGESIC: Status: ACTIVE | Noted: 2025-07-07

## 2025-07-07 LAB
ACID FAST STN SPEC: NEGATIVE
ALBUMIN SERPL-MCNC: 3.8 G/DL (ref 3.2–4.6)
ALBUMIN/GLOB SERPL: 1.3 (ref 1–1.9)
ALP SERPL-CCNC: 86 U/L (ref 35–104)
ALT SERPL-CCNC: 19 U/L (ref 8–45)
ANION GAP SERPL CALC-SCNC: 13 MMOL/L (ref 7–16)
AST SERPL-CCNC: 29 U/L (ref 15–37)
BASOPHILS # BLD: 0.03 K/UL (ref 0–0.2)
BASOPHILS NFR BLD: 0.7 % (ref 0–2)
BILIRUB SERPL-MCNC: 0.2 MG/DL (ref 0–1.2)
BUN SERPL-MCNC: 11 MG/DL (ref 8–23)
CALCIUM SERPL-MCNC: 9.5 MG/DL (ref 8.8–10.2)
CHLORIDE SERPL-SCNC: 99 MMOL/L (ref 98–107)
CHOLEST SERPL-MCNC: 160 MG/DL (ref 0–200)
CO2 SERPL-SCNC: 28 MMOL/L (ref 20–29)
CREAT SERPL-MCNC: 0.95 MG/DL (ref 0.6–1.1)
DIFFERENTIAL METHOD BLD: ABNORMAL
EOSINOPHIL # BLD: 0.48 K/UL (ref 0–0.8)
EOSINOPHIL NFR BLD: 10.9 % (ref 0.5–7.8)
ERYTHROCYTE [DISTWIDTH] IN BLOOD BY AUTOMATED COUNT: 12.1 % (ref 11.9–14.6)
EST. AVERAGE GLUCOSE BLD GHB EST-MCNC: 126 MG/DL
GLOBULIN SER CALC-MCNC: 3 G/DL (ref 2.3–3.5)
GLUCOSE SERPL-MCNC: 89 MG/DL (ref 70–99)
HBA1C MFR BLD: 6 % (ref 0–5.6)
HCT VFR BLD AUTO: 36.7 % (ref 35.8–46.3)
HDLC SERPL-MCNC: 79 MG/DL (ref 40–60)
HDLC SERPL: 2 (ref 0–5)
HGB BLD-MCNC: 12.1 G/DL (ref 11.7–15.4)
IMM GRANULOCYTES # BLD AUTO: 0 K/UL (ref 0–0.5)
IMM GRANULOCYTES NFR BLD AUTO: 0 % (ref 0–5)
LDLC SERPL CALC-MCNC: 72 MG/DL (ref 0–100)
LYMPHOCYTES # BLD: 2.23 K/UL (ref 0.5–4.6)
LYMPHOCYTES NFR BLD: 50.8 % (ref 13–44)
MCH RBC QN AUTO: 32.6 PG (ref 26.1–32.9)
MCHC RBC AUTO-ENTMCNC: 33 G/DL (ref 31.4–35)
MCV RBC AUTO: 98.9 FL (ref 82–102)
MONOCYTES # BLD: 0.22 K/UL (ref 0.1–1.3)
MONOCYTES NFR BLD: 5 % (ref 4–12)
MYCOBACTERIUM SPEC QL CULT: NEGATIVE
NEUTS SEG # BLD: 1.43 K/UL (ref 1.7–8.2)
NEUTS SEG NFR BLD: 32.6 % (ref 43–78)
NRBC # BLD: 0 K/UL (ref 0–0.2)
PLATELET # BLD AUTO: 271 K/UL (ref 150–450)
PMV BLD AUTO: 9.5 FL (ref 9.4–12.3)
POTASSIUM SERPL-SCNC: 3.7 MMOL/L (ref 3.5–5.1)
PROT SERPL-MCNC: 6.8 G/DL (ref 6.3–8.2)
RBC # BLD AUTO: 3.71 M/UL (ref 4.05–5.2)
SODIUM SERPL-SCNC: 140 MMOL/L (ref 136–145)
SPECIMEN PREPARATION: NORMAL
SPECIMEN SOURCE: NORMAL
TRIGL SERPL-MCNC: 50 MG/DL (ref 0–150)
TSH W FREE THYROID IF ABNORMAL: 3.18 UIU/ML (ref 0.27–4.2)
VLDLC SERPL CALC-MCNC: 10 MG/DL (ref 6–23)
WBC # BLD AUTO: 4.4 K/UL (ref 4.3–11.1)

## 2025-07-07 PROCEDURE — 3074F SYST BP LT 130 MM HG: CPT

## 2025-07-07 PROCEDURE — 3078F DIAST BP <80 MM HG: CPT

## 2025-07-07 PROCEDURE — 99214 OFFICE O/P EST MOD 30 MIN: CPT

## 2025-07-07 RX ORDER — LEVOTHYROXINE SODIUM 50 MCG
50 TABLET ORAL DAILY
Qty: 30 TABLET | Refills: 5 | Status: SHIPPED | OUTPATIENT
Start: 2025-07-07

## 2025-07-07 RX ORDER — DULOXETIN HYDROCHLORIDE 60 MG/1
60 CAPSULE, DELAYED RELEASE ORAL DAILY
Qty: 30 CAPSULE | Refills: 5 | Status: SHIPPED | OUTPATIENT
Start: 2025-07-07

## 2025-07-07 RX ORDER — LORATADINE 10 MG/1
10 TABLET ORAL DAILY
Qty: 90 TABLET | Refills: 1 | Status: SHIPPED | OUTPATIENT
Start: 2025-07-07 | End: 2026-01-03

## 2025-07-07 RX ORDER — BUPROPION HYDROCHLORIDE 300 MG/1
300 TABLET ORAL EVERY MORNING
Qty: 30 TABLET | Refills: 5 | Status: SHIPPED | OUTPATIENT
Start: 2025-07-07

## 2025-07-07 RX ORDER — HYDROCHLOROTHIAZIDE 25 MG/1
25 TABLET ORAL DAILY
Qty: 30 TABLET | Refills: 5 | Status: CANCELLED | OUTPATIENT
Start: 2025-07-07

## 2025-07-07 RX ORDER — OMEPRAZOLE 40 MG/1
40 CAPSULE, DELAYED RELEASE ORAL DAILY
Qty: 30 CAPSULE | Refills: 5 | Status: SHIPPED | OUTPATIENT
Start: 2025-07-07

## 2025-07-07 RX ORDER — POTASSIUM CHLORIDE 750 MG/1
TABLET, EXTENDED RELEASE ORAL
Qty: 240 TABLET | Refills: 5 | Status: SHIPPED | OUTPATIENT
Start: 2025-07-07

## 2025-07-07 RX ORDER — ATORVASTATIN CALCIUM 20 MG/1
20 TABLET, FILM COATED ORAL DAILY
Qty: 30 TABLET | Refills: 5 | Status: SHIPPED | OUTPATIENT
Start: 2025-07-07

## 2025-07-07 RX ORDER — MINERAL OIL AND WHITE PETROLATUM 30; 940 MG/G; MG/G
OINTMENT OPHTHALMIC
COMMUNITY
Start: 2025-05-14

## 2025-07-07 RX ORDER — BACLOFEN 10 MG/1
10 TABLET ORAL 3 TIMES DAILY
Qty: 90 TABLET | Refills: 5 | Status: CANCELLED | OUTPATIENT
Start: 2025-07-07

## 2025-07-07 RX ORDER — GABAPENTIN 300 MG/1
600 CAPSULE ORAL 2 TIMES DAILY
Qty: 120 CAPSULE | Refills: 5 | Status: SHIPPED | OUTPATIENT
Start: 2025-07-07 | End: 2026-01-03

## 2025-07-07 RX ORDER — ASCORBIC ACID 500 MG
TABLET ORAL
Qty: 60 TABLET | Refills: 5 | Status: SHIPPED | OUTPATIENT
Start: 2025-07-07

## 2025-07-07 RX ORDER — BIOTIN 1 MG
TABLET ORAL
Qty: 30 TABLET | Refills: 5 | Status: SHIPPED | OUTPATIENT
Start: 2025-07-07

## 2025-07-07 RX ORDER — DIPHENHYDRAMINE HCL 25 MG
CAPSULE ORAL
Qty: 120 CAPSULE | Refills: 3 | Status: CANCELLED | OUTPATIENT
Start: 2025-07-07

## 2025-07-07 ASSESSMENT — ENCOUNTER SYMPTOMS
PHOTOPHOBIA: 0
EYE DISCHARGE: 0
COLOR CHANGE: 1
BLOOD IN STOOL: 0
DIARRHEA: 0
EYE REDNESS: 0
EYE ITCHING: 1
EYE PAIN: 0
CHEST TIGHTNESS: 0
NAUSEA: 0
CONSTIPATION: 0
COUGH: 0
BACK PAIN: 1
SHORTNESS OF BREATH: 0
ABDOMINAL PAIN: 1
VOMITING: 0

## 2025-07-07 NOTE — PROGRESS NOTES
Absolute 0.03 0.00 - 0.20 K/UL    Immature Granulocytes Absolute 0.00 0.0 - 0.5 K/UL        ASSESSMENT & PLAN      I have reviewed the patient's past medical history, social history and family history and vitals.  We have discussed treatment plan and follow up and given patient instructions.  Patient's questions are answered and we will follow up as indicated.    Mayda was seen today for follow-up.    Diagnoses and all orders for this visit:    Fibromyalgia  -     gabapentin (NEURONTIN) 300 MG capsule; Take 2 capsules by mouth 2 times daily for 180 days.    Itching  Comments:  d/c benadryl, start claritin  Orders:  -     loratadine (CLARITIN) 10 MG tablet; Take 1 tablet by mouth daily    Chronic, continuous use of opioids  Comments:  Under pain management care    Mixed hyperlipidemia  -     atorvastatin (LIPITOR) 20 MG tablet; Take 1 tablet by mouth daily    Depression, unspecified depression type  -     buPROPion (WELLBUTRIN XL) 300 MG extended release tablet; Take 1 tablet by mouth every morning  -     DULoxetine (CYMBALTA) 60 MG extended release capsule; Take 1 capsule by mouth daily    Acquired hypothyroidism  -     SYNTHROID 50 MCG tablet; Take 1 tablet by mouth Daily    Gastroesophageal reflux disease, unspecified whether esophagitis present  -     omeprazole (PRILOSEC) 40 MG delayed release capsule; Take 1 capsule by mouth daily    Long term current use of diuretic  Comments:  CMP for K level due to HCT trx  Orders:  -     potassium chloride (KLOR-CON M) 10 MEQ extended release tablet; 4 in am and pm    Change in color of skin mole  Comments:  Referral to dermatology placed  Orders:  -     Amb External Referral To Dermatology    Skin lesions  -     Amb External Referral To Dermatology    Medication refill  -     ascorbic acid (VITAMIN C) 500 MG tablet; 2 tabs daily  -     Biotin 1000 MCG TABS; 1/2 daily    At high risk for falls       Assessment & Plan  1. Gastroesophageal reflux disease (GERD).  - Reports

## 2025-07-08 ENCOUNTER — RESULTS FOLLOW-UP (OUTPATIENT)
Dept: FAMILY MEDICINE CLINIC | Facility: CLINIC | Age: 65
End: 2025-07-08

## 2025-07-11 ENCOUNTER — CLINICAL DOCUMENTATION (OUTPATIENT)
Dept: FAMILY MEDICINE CLINIC | Facility: CLINIC | Age: 65
End: 2025-07-11

## 2025-07-11 NOTE — PROGRESS NOTES
Potassium Chloride Lucero ER 10MEQ er tablets     Approved on July 10 by Wander AIKENP 2017     Effective Date: 7/10/2025  Authorization Expiration Date: 7/10/2026

## 2025-08-08 ENCOUNTER — TELEPHONE (OUTPATIENT)
Dept: PULMONOLOGY | Age: 65
End: 2025-08-08

## 2025-08-15 ENCOUNTER — TELEPHONE (OUTPATIENT)
Dept: PULMONOLOGY | Age: 65
End: 2025-08-15

## (undated) DEVICE — ZIMMER® STERILE DISPOSABLE TOURNIQUET CUFF WITH PLC, DUAL PORT, SINGLE BLADDER, 18 IN. (46 CM)

## (undated) DEVICE — NEEDLE SYRINGE 18GA L1.5IN RED PLAS HUB S STL BLNT FILL W/O

## (undated) DEVICE — INTENDED FOR TISSUE SEPARATION, AND OTHER PROCEDURES THAT REQUIRE A SHARP SURGICAL BLADE TO PUNCTURE OR CUT.: Brand: BARD-PARKER ® STAINLESS STEEL BLADES

## (undated) DEVICE — SOLUTION IV 1000ML 0.9% SOD CHL

## (undated) DEVICE — SINGLE PORT MANIFOLD: Brand: NEPTUNE 2

## (undated) DEVICE — SYR 3ML LL TIP 1/10ML GRAD --

## (undated) DEVICE — CANNULA NSL ORAL AD FOR CAPNOFLEX CO2 O2 AIRLFE

## (undated) DEVICE — SUTURE ETHBND EXCEL SZ 3-0 L30IN NONABSORBABLE GRN L26MM SH X832H

## (undated) DEVICE — SLING ARM ENV PD DLX LG BLU --

## (undated) DEVICE — DRAPE, FILM SHEET, 44X65 STERILE: Brand: MEDLINE

## (undated) DEVICE — CONNECTOR TBNG OD5-7MM O2 END DISP

## (undated) DEVICE — DRAPE,HAND,STERILE: Brand: MEDLINE

## (undated) DEVICE — OCCLUSIVE GAUZE STRIP,3% BISMUTH TRIBROMOPHENATE IN PETROLATUM BLEND: Brand: XEROFORM

## (undated) DEVICE — SUTURE PDS II SZ 2-0 L27IN ABSRB VLT L26MM CT-2 1/2 CIR Z333H

## (undated) DEVICE — NDL PRT INJ NSAF BLNT 18GX1.5 --

## (undated) DEVICE — SUTURE ETHLN SZ 4-0 L18IN NONABSORBABLE BLK L19MM PS-2 3/8 1667H

## (undated) DEVICE — SYR 5ML 1/5 GRAD LL NSAF LF --

## (undated) DEVICE — AMD ANTIMICROBIAL BANDAGE ROLL,6 PLY: Brand: KERLIX

## (undated) DEVICE — LUBE JELLY FOIL PACK 1.4 OZ: Brand: MEDLINE INDUSTRIES, INC.

## (undated) DEVICE — SLING ARM 2-37INX8-17IN PCH -- MED

## (undated) DEVICE — ABDOMINAL PAD: Brand: DERMACEA

## (undated) DEVICE — ENDOSCOPIC KIT 1.1+ OP4 CA DE 2 GWN AAMI LEVEL 3

## (undated) DEVICE — BANDAGE,GAUZE,CONFORMING,3"X75",STRL,LF: Brand: MEDLINE

## (undated) DEVICE — 4.0MM ROUND BUR

## (undated) DEVICE — SYRINGE MEDICAL 3ML CLEAR PLASTIC STANDARD NON CONTROL LUERLOCK TIP DISPOSABLE

## (undated) DEVICE — 2000CC GUARDIAN II: Brand: GUARDIAN

## (undated) DEVICE — AMD ANTIMICROBIAL GAUZE SPONGES,12 PLY USP TYPE VII, 0.2% POLYHEXAMETHYLENE BIGUANIDE HCI (PHMB): Brand: CURITY

## (undated) DEVICE — PRECISION THIN (7.0 X 0.38 X 18.5MM)

## (undated) DEVICE — BLOCK BITE AD 60FR W/ VELC STRP ADDRESSES MOST PT AND

## (undated) DEVICE — SYRINGE MED 10ML LUERLOCK TIP W/O SFTY DISP

## (undated) DEVICE — DISPOSABLE BIOPSY VALVE MAJ-1555: Brand: SINGLE USE BIOPSY VALVE (STERILE)

## (undated) DEVICE — AIRLIFE™ OXYGEN TUBING 7 FEET (2.1 M) CRUSH RESISTANT OXYGEN TUBING, VINYL TIPPED: Brand: AIRLIFE™

## (undated) DEVICE — (D)PREP SKN CHLRAPRP APPL 26ML -- CONVERT TO ITEM 371833

## (undated) DEVICE — STERILE HOOK LOCK LATEX FREE ELASTIC BANDAGE 2INX5YD: Brand: HOOK LOCK™

## (undated) DEVICE — STERILE HOOK LOCK LATEX FREE ELASTIC BANDAGE 3INX5YD: Brand: HOOK LOCK™

## (undated) DEVICE — APPLICATOR BNDG 1MM ADH PREMIERPRO EXOFIN

## (undated) DEVICE — SURGICAL PROCEDURE PACK BASIC ST FRANCIS

## (undated) DEVICE — KENDALL RADIOLUCENT FOAM MONITORING ELECTRODE RECTANGULAR SHAPE: Brand: KENDALL

## (undated) DEVICE — SUTURE PDS II SZ 0 L27IN ABSRB VLT L26MM CT-2 1/2 CIR Z334H

## (undated) DEVICE — Device

## (undated) DEVICE — GAUZE,SPONGE,4"X4",12PLY,WOVEN,NS,LF: Brand: MEDLINE

## (undated) DEVICE — STRETCH BANDAGE ROLL: Brand: DERMACEA

## (undated) DEVICE — PRECISION THIN (5.5 X 0.38 X 18.0MM)

## (undated) DEVICE — INSTRUMENT ORTH L6IN LNG S STL SGL IN TOME